# Patient Record
Sex: FEMALE | Race: WHITE | NOT HISPANIC OR LATINO | Employment: OTHER | URBAN - METROPOLITAN AREA
[De-identification: names, ages, dates, MRNs, and addresses within clinical notes are randomized per-mention and may not be internally consistent; named-entity substitution may affect disease eponyms.]

---

## 2017-07-22 ENCOUNTER — HOSPITAL ENCOUNTER (OUTPATIENT)
Dept: URGENT CARE | Facility: CLINIC | Age: 57
Discharge: HOME | End: 2017-07-22
Attending: FAMILY MEDICINE | Admitting: FAMILY MEDICINE

## 2017-11-14 ENCOUNTER — ON CAMPUS - OUTPATIENT (AMBULATORY)
Dept: URBAN - METROPOLITAN AREA HOSPITAL 2 | Facility: HOSPITAL | Age: 57
End: 2017-11-14

## 2017-11-14 ENCOUNTER — OFFICE (AMBULATORY)
Dept: URBAN - METROPOLITAN AREA CLINIC 64 | Facility: CLINIC | Age: 57
End: 2017-11-14

## 2017-11-14 VITALS
SYSTOLIC BLOOD PRESSURE: 118 MMHG | DIASTOLIC BLOOD PRESSURE: 80 MMHG | WEIGHT: 180 LBS | RESPIRATION RATE: 16 BRPM | OXYGEN SATURATION: 96 % | DIASTOLIC BLOOD PRESSURE: 79 MMHG | RESPIRATION RATE: 17 BRPM | SYSTOLIC BLOOD PRESSURE: 114 MMHG | OXYGEN SATURATION: 98 % | HEART RATE: 94 BPM | SYSTOLIC BLOOD PRESSURE: 124 MMHG | SYSTOLIC BLOOD PRESSURE: 127 MMHG | DIASTOLIC BLOOD PRESSURE: 85 MMHG | HEIGHT: 60 IN | SYSTOLIC BLOOD PRESSURE: 125 MMHG | DIASTOLIC BLOOD PRESSURE: 89 MMHG | HEART RATE: 101 BPM | SYSTOLIC BLOOD PRESSURE: 123 MMHG | DIASTOLIC BLOOD PRESSURE: 93 MMHG | DIASTOLIC BLOOD PRESSURE: 72 MMHG | TEMPERATURE: 97.8 F | DIASTOLIC BLOOD PRESSURE: 75 MMHG | HEART RATE: 92 BPM | OXYGEN SATURATION: 97 % | SYSTOLIC BLOOD PRESSURE: 106 MMHG | SYSTOLIC BLOOD PRESSURE: 119 MMHG | SYSTOLIC BLOOD PRESSURE: 132 MMHG | HEART RATE: 95 BPM | DIASTOLIC BLOOD PRESSURE: 82 MMHG | OXYGEN SATURATION: 100 % | HEART RATE: 99 BPM | DIASTOLIC BLOOD PRESSURE: 61 MMHG | HEART RATE: 91 BPM | HEART RATE: 107 BPM | SYSTOLIC BLOOD PRESSURE: 139 MMHG | DIASTOLIC BLOOD PRESSURE: 74 MMHG | HEART RATE: 90 BPM | SYSTOLIC BLOOD PRESSURE: 103 MMHG

## 2017-11-14 DIAGNOSIS — K29.70 GASTRITIS, UNSPECIFIED, WITHOUT BLEEDING: ICD-10-CM

## 2017-11-14 DIAGNOSIS — K22.2 ESOPHAGEAL OBSTRUCTION: ICD-10-CM

## 2017-11-14 DIAGNOSIS — K92.1 MELENA: ICD-10-CM

## 2017-11-14 DIAGNOSIS — K25.9 GASTRIC ULCER, UNSPECIFIED AS ACUTE OR CHRONIC, WITHOUT HEMO: ICD-10-CM

## 2017-11-14 DIAGNOSIS — K31.89 OTHER DISEASES OF STOMACH AND DUODENUM: ICD-10-CM

## 2017-11-14 DIAGNOSIS — Z86.010 PERSONAL HISTORY OF COLONIC POLYPS: ICD-10-CM

## 2017-11-14 DIAGNOSIS — K44.9 DIAPHRAGMATIC HERNIA WITHOUT OBSTRUCTION OR GANGRENE: ICD-10-CM

## 2017-11-14 DIAGNOSIS — R13.10 DYSPHAGIA, UNSPECIFIED: ICD-10-CM

## 2017-11-14 DIAGNOSIS — Z09 ENCOUNTER FOR FOLLOW-UP EXAMINATION AFTER COMPLETED TREATMEN: ICD-10-CM

## 2017-11-14 DIAGNOSIS — K57.30 DIVERTICULOSIS OF LARGE INTESTINE WITHOUT PERFORATION OR ABS: ICD-10-CM

## 2017-11-14 LAB
GI HISTOLOGY: A. SELECT: (no result)
GI HISTOLOGY: B. SELECT: (no result)
GI HISTOLOGY: C. SELECT: (no result)
GI HISTOLOGY: PDF REPORT: (no result)

## 2017-11-14 PROCEDURE — 43450 DILATE ESOPHAGUS 1/MULT PASS: CPT | Performed by: INTERNAL MEDICINE

## 2017-11-14 PROCEDURE — 88305 TISSUE EXAM BY PATHOLOGIST: CPT | Performed by: INTERNAL MEDICINE

## 2017-11-14 PROCEDURE — G0105 COLORECTAL SCRN; HI RISK IND: HCPCS | Performed by: INTERNAL MEDICINE

## 2017-11-14 PROCEDURE — 43239 EGD BIOPSY SINGLE/MULTIPLE: CPT | Mod: 59 | Performed by: INTERNAL MEDICINE

## 2017-11-14 RX ADMIN — PROPOFOL: 10 INJECTION, EMULSION INTRAVENOUS at 09:50

## 2017-11-14 NOTE — SERVICENOTES
If Bx of antral abnormality comes back as GAVE, will need Repeat EGD with APC of entire antrum to ablate GAVE. 
Ulcer is likely mike ulcer from large Hiatal hernia.

## 2017-11-14 NOTE — SERVICEHPINOTES
DARYL FIELDS  is a  57  female   who presents today for a  EGD-Colonoscopy   for   the indications listed below. The updated Patient Profile was reviewed prior to the procedure, in conjunction with the Physical Exam, including medical conditions, surgical procedures, medications, allergies, family history and social history. See Physical Exam time stamp below for date and time of HPI completion.Pre-operatively, I reviewed the indication(s) for the procedure, the risks of the procedure [including but not limited to: unexpected bleeding possibly requiring hospitalization and/or unplanned repeat procedures, perforation possibly requiring surgical treatment, missed lesions and complications of sedation/MAC (also explained by anesthesia staff)]. I have evaluated the patient for risks associated with the planned anesthesia and the procedure to be performed and find the patient an acceptable candidate for IV sedation.Multiple opportunities were provided for any questions or concerns, and all questions were answered satisfactorily before any anesthesia was administered. We will proceed with the planned procedure. BRmelena x many months. dysphagia worsening x many months. Food sticks and has required vomiting to get food out of her stomach. BR

## 2017-12-06 ENCOUNTER — ON CAMPUS - OUTPATIENT (AMBULATORY)
Dept: URBAN - METROPOLITAN AREA HOSPITAL 2 | Facility: HOSPITAL | Age: 57
End: 2017-12-06

## 2017-12-06 VITALS
SYSTOLIC BLOOD PRESSURE: 122 MMHG | DIASTOLIC BLOOD PRESSURE: 75 MMHG | SYSTOLIC BLOOD PRESSURE: 127 MMHG | OXYGEN SATURATION: 99 % | OXYGEN SATURATION: 95 % | DIASTOLIC BLOOD PRESSURE: 34 MMHG | OXYGEN SATURATION: 98 % | HEART RATE: 93 BPM | TEMPERATURE: 99.1 F | DIASTOLIC BLOOD PRESSURE: 79 MMHG | HEART RATE: 94 BPM | SYSTOLIC BLOOD PRESSURE: 135 MMHG | RESPIRATION RATE: 15 BRPM | SYSTOLIC BLOOD PRESSURE: 137 MMHG | DIASTOLIC BLOOD PRESSURE: 67 MMHG | DIASTOLIC BLOOD PRESSURE: 73 MMHG | HEART RATE: 85 BPM | OXYGEN SATURATION: 100 % | OXYGEN SATURATION: 94 % | HEIGHT: 60 IN | RESPIRATION RATE: 18 BRPM | RESPIRATION RATE: 17 BRPM | RESPIRATION RATE: 16 BRPM | DIASTOLIC BLOOD PRESSURE: 53 MMHG | SYSTOLIC BLOOD PRESSURE: 119 MMHG | SYSTOLIC BLOOD PRESSURE: 63 MMHG | OXYGEN SATURATION: 92 % | WEIGHT: 181 LBS

## 2017-12-06 DIAGNOSIS — K22.2 ESOPHAGEAL OBSTRUCTION: ICD-10-CM

## 2017-12-06 DIAGNOSIS — K31.819 ANGIODYSPLASIA OF STOMACH AND DUODENUM WITHOUT BLEEDING: ICD-10-CM

## 2017-12-06 DIAGNOSIS — K44.9 DIAPHRAGMATIC HERNIA WITHOUT OBSTRUCTION OR GANGRENE: ICD-10-CM

## 2017-12-06 DIAGNOSIS — D50.9 IRON DEFICIENCY ANEMIA, UNSPECIFIED: ICD-10-CM

## 2017-12-06 PROCEDURE — 43450 DILATE ESOPHAGUS 1/MULT PASS: CPT | Performed by: INTERNAL MEDICINE

## 2017-12-06 PROCEDURE — 43235 EGD DIAGNOSTIC BRUSH WASH: CPT | Performed by: INTERNAL MEDICINE

## 2017-12-06 RX ADMIN — PROPOFOL: 10 INJECTION, EMULSION INTRAVENOUS at 10:33

## 2017-12-14 ENCOUNTER — OFFICE (AMBULATORY)
Dept: URBAN - METROPOLITAN AREA CLINIC 64 | Facility: CLINIC | Age: 57
End: 2017-12-14

## 2017-12-14 VITALS
HEIGHT: 60 IN | WEIGHT: 181 LBS | SYSTOLIC BLOOD PRESSURE: 112 MMHG | DIASTOLIC BLOOD PRESSURE: 78 MMHG | HEART RATE: 91 BPM

## 2017-12-14 DIAGNOSIS — R10.13 EPIGASTRIC PAIN: ICD-10-CM

## 2017-12-14 DIAGNOSIS — R13.19 OTHER DYSPHAGIA: ICD-10-CM

## 2017-12-14 LAB
C-REACTIVE PROTEIN, QUANT: 8.4 MG/L — HIGH (ref 0–4.9)
CBC WITH DIFFERENTIAL/PLATELET: BASO (ABSOLUTE): 0 X10E3/UL (ref 0–0.2)
CBC WITH DIFFERENTIAL/PLATELET: BASOS: 0 %
CBC WITH DIFFERENTIAL/PLATELET: EOS (ABSOLUTE): 0.1 X10E3/UL (ref 0–0.4)
CBC WITH DIFFERENTIAL/PLATELET: EOS: 2 %
CBC WITH DIFFERENTIAL/PLATELET: HEMATOCRIT: 35.6 % (ref 34–46.6)
CBC WITH DIFFERENTIAL/PLATELET: HEMATOLOGY COMMENTS: (no result)
CBC WITH DIFFERENTIAL/PLATELET: HEMOGLOBIN: 11.5 G/DL (ref 11.1–15.9)
CBC WITH DIFFERENTIAL/PLATELET: IMMATURE CELLS: (no result)
CBC WITH DIFFERENTIAL/PLATELET: IMMATURE GRANS (ABS): 0 X10E3/UL (ref 0–0.1)
CBC WITH DIFFERENTIAL/PLATELET: IMMATURE GRANULOCYTES: 0 %
CBC WITH DIFFERENTIAL/PLATELET: LYMPHS (ABSOLUTE): 2.8 X10E3/UL (ref 0.7–3.1)
CBC WITH DIFFERENTIAL/PLATELET: LYMPHS: 36 %
CBC WITH DIFFERENTIAL/PLATELET: MCH: 28 PG (ref 26.6–33)
CBC WITH DIFFERENTIAL/PLATELET: MCHC: 32.3 G/DL (ref 31.5–35.7)
CBC WITH DIFFERENTIAL/PLATELET: MCV: 87 FL (ref 79–97)
CBC WITH DIFFERENTIAL/PLATELET: MONOCYTES(ABSOLUTE): 0.4 X10E3/UL (ref 0.1–0.9)
CBC WITH DIFFERENTIAL/PLATELET: MONOCYTES: 5 %
CBC WITH DIFFERENTIAL/PLATELET: NEUTROPHILS (ABSOLUTE): 4.5 X10E3/UL (ref 1.4–7)
CBC WITH DIFFERENTIAL/PLATELET: NEUTROPHILS: 57 %
CBC WITH DIFFERENTIAL/PLATELET: NRBC: (no result)
CBC WITH DIFFERENTIAL/PLATELET: PLATELETS: 331 X10E3/UL (ref 150–379)
CBC WITH DIFFERENTIAL/PLATELET: RBC: 4.1 X10E6/UL (ref 3.77–5.28)
CBC WITH DIFFERENTIAL/PLATELET: RDW: 14.8 % (ref 12.3–15.4)
CBC WITH DIFFERENTIAL/PLATELET: WBC: 7.9 X10E3/UL (ref 3.4–10.8)
COMP. METABOLIC PANEL (14): A/G RATIO: 1.8 (ref 1.2–2.2)
COMP. METABOLIC PANEL (14): ALBUMIN, SERUM: 4.5 G/DL (ref 3.5–5.5)
COMP. METABOLIC PANEL (14): ALKALINE PHOSPHATASE, S: 101 IU/L (ref 39–117)
COMP. METABOLIC PANEL (14): ALT (SGPT): 18 IU/L (ref 0–32)
COMP. METABOLIC PANEL (14): AST (SGOT): 19 IU/L (ref 0–40)
COMP. METABOLIC PANEL (14): BILIRUBIN, TOTAL: 0.2 MG/DL (ref 0–1.2)
COMP. METABOLIC PANEL (14): BUN/CREATININE RATIO: 15 (ref 9–23)
COMP. METABOLIC PANEL (14): BUN: 11 MG/DL (ref 6–24)
COMP. METABOLIC PANEL (14): CALCIUM, SERUM: 9.8 MG/DL (ref 8.7–10.2)
COMP. METABOLIC PANEL (14): CARBON DIOXIDE, TOTAL: 22 MMOL/L (ref 18–29)
COMP. METABOLIC PANEL (14): CHLORIDE, SERUM: 99 MMOL/L (ref 96–106)
COMP. METABOLIC PANEL (14): CREATININE, SERUM: 0.74 MG/DL (ref 0.57–1)
COMP. METABOLIC PANEL (14): EGFR IF AFRICN AM: 105 ML/MIN/1.73 (ref 59–?)
COMP. METABOLIC PANEL (14): EGFR IF NONAFRICN AM: 91 ML/MIN/1.73 (ref 59–?)
COMP. METABOLIC PANEL (14): GLOBULIN, TOTAL: 2.5 G/DL (ref 1.5–4.5)
COMP. METABOLIC PANEL (14): GLUCOSE, SERUM: 82 MG/DL (ref 65–99)
COMP. METABOLIC PANEL (14): POTASSIUM, SERUM: 3.8 MMOL/L (ref 3.5–5.2)
COMP. METABOLIC PANEL (14): PROTEIN, TOTAL, SERUM: 7 G/DL (ref 6–8.5)
COMP. METABOLIC PANEL (14): SODIUM, SERUM: 139 MMOL/L (ref 134–144)
FERRITIN, SERUM: 77 NG/ML (ref 15–150)

## 2017-12-14 PROCEDURE — 99214 OFFICE O/P EST MOD 30 MIN: CPT | Performed by: INTERNAL MEDICINE

## 2017-12-14 RX ORDER — DICYCLOMINE HYDROCHLORIDE 20 MG/1
80 TABLET ORAL
Qty: 60 | Refills: 1 | Status: COMPLETED
Start: 2017-12-14 | End: 2018-01-05

## 2018-01-05 ENCOUNTER — OFFICE (AMBULATORY)
Dept: URBAN - METROPOLITAN AREA CLINIC 64 | Facility: CLINIC | Age: 58
End: 2018-01-05

## 2018-01-05 VITALS
SYSTOLIC BLOOD PRESSURE: 128 MMHG | WEIGHT: 182 LBS | DIASTOLIC BLOOD PRESSURE: 76 MMHG | HEART RATE: 83 BPM | HEIGHT: 60 IN

## 2018-01-05 DIAGNOSIS — R10.13 EPIGASTRIC PAIN: ICD-10-CM

## 2018-01-05 DIAGNOSIS — K59.1 FUNCTIONAL DIARRHEA: ICD-10-CM

## 2018-01-05 PROCEDURE — 99214 OFFICE O/P EST MOD 30 MIN: CPT | Performed by: INTERNAL MEDICINE

## 2018-01-05 RX ORDER — LOPERAMIDE HYDROCHLORIDE 2 MG/1
TABLET, FILM COATED ORAL
Qty: 60 | Refills: 2 | Status: COMPLETED
Start: 2018-01-05 | End: 2018-04-17

## 2018-01-05 RX ORDER — HYOSCYAMINE SULFATE 0.12 MG/1
0.5 TABLET ORAL; SUBLINGUAL
Qty: 60 | Refills: 2 | Status: COMPLETED
Start: 2018-01-05 | End: 2018-01-31

## 2018-01-31 ENCOUNTER — OFFICE (AMBULATORY)
Dept: URBAN - METROPOLITAN AREA CLINIC 64 | Facility: CLINIC | Age: 58
End: 2018-01-31

## 2018-01-31 VITALS
HEIGHT: 60 IN | SYSTOLIC BLOOD PRESSURE: 119 MMHG | DIASTOLIC BLOOD PRESSURE: 77 MMHG | HEART RATE: 75 BPM | WEIGHT: 176 LBS

## 2018-01-31 DIAGNOSIS — R15.9 FULL INCONTINENCE OF FECES: ICD-10-CM

## 2018-01-31 DIAGNOSIS — K58.0 IRRITABLE BOWEL SYNDROME WITH DIARRHEA: ICD-10-CM

## 2018-01-31 PROCEDURE — 99214 OFFICE O/P EST MOD 30 MIN: CPT | Performed by: INTERNAL MEDICINE

## 2018-01-31 RX ORDER — COLESTIPOL HYDROCHLORIDE 1 G/1
2 TABLET, FILM COATED ORAL
Qty: 90 | Refills: 3 | Status: COMPLETED
Start: 2017-12-29 | End: 2018-04-17

## 2018-01-31 RX ORDER — ALOSETRON HYDROCHLORIDE 0.5 MG/1
1 TABLET ORAL
Qty: 180 | Refills: 3 | Status: COMPLETED
Start: 2018-01-31 | End: 2019-04-25

## 2018-02-15 ENCOUNTER — OFFICE (AMBULATORY)
Dept: URBAN - METROPOLITAN AREA CLINIC 64 | Facility: CLINIC | Age: 58
End: 2018-02-15

## 2018-02-15 VITALS
DIASTOLIC BLOOD PRESSURE: 81 MMHG | WEIGHT: 176 LBS | HEIGHT: 60 IN | HEART RATE: 95 BPM | SYSTOLIC BLOOD PRESSURE: 120 MMHG

## 2018-02-15 DIAGNOSIS — K59.1 FUNCTIONAL DIARRHEA: ICD-10-CM

## 2018-02-15 PROCEDURE — 99212 OFFICE O/P EST SF 10 MIN: CPT | Performed by: NURSE PRACTITIONER

## 2018-04-17 ENCOUNTER — OFFICE (AMBULATORY)
Dept: URBAN - METROPOLITAN AREA CLINIC 64 | Facility: CLINIC | Age: 58
End: 2018-04-17

## 2018-04-17 VITALS
HEART RATE: 85 BPM | WEIGHT: 167 LBS | HEIGHT: 60 IN | SYSTOLIC BLOOD PRESSURE: 104 MMHG | DIASTOLIC BLOOD PRESSURE: 75 MMHG

## 2018-04-17 DIAGNOSIS — K58.0 IRRITABLE BOWEL SYNDROME WITH DIARRHEA: ICD-10-CM

## 2018-04-17 PROCEDURE — 99213 OFFICE O/P EST LOW 20 MIN: CPT | Performed by: INTERNAL MEDICINE

## 2018-04-17 RX ORDER — ALOSETRON HYDROCHLORIDE 0.5 MG/1
1 TABLET ORAL
Qty: 180 | Refills: 3 | Status: COMPLETED
Start: 2018-01-31 | End: 2019-04-25

## 2018-10-02 ENCOUNTER — OFFICE (AMBULATORY)
Dept: URBAN - METROPOLITAN AREA CLINIC 64 | Facility: CLINIC | Age: 58
End: 2018-10-02

## 2018-10-02 VITALS
SYSTOLIC BLOOD PRESSURE: 111 MMHG | DIASTOLIC BLOOD PRESSURE: 73 MMHG | HEART RATE: 97 BPM | WEIGHT: 172 LBS | HEIGHT: 60 IN

## 2018-10-02 DIAGNOSIS — R10.13 EPIGASTRIC PAIN: ICD-10-CM

## 2018-10-02 DIAGNOSIS — K58.0 IRRITABLE BOWEL SYNDROME WITH DIARRHEA: ICD-10-CM

## 2018-10-02 PROCEDURE — 99214 OFFICE O/P EST MOD 30 MIN: CPT | Performed by: INTERNAL MEDICINE

## 2018-10-02 RX ORDER — SUCRALFATE 1 G/1
4 TABLET ORAL
Qty: 120 | Refills: 5 | Status: COMPLETED
Start: 2018-10-02 | End: 2018-12-17

## 2018-12-17 ENCOUNTER — OFFICE (AMBULATORY)
Dept: URBAN - METROPOLITAN AREA CLINIC 64 | Facility: CLINIC | Age: 58
End: 2018-12-17

## 2018-12-17 VITALS
HEART RATE: 64 BPM | HEIGHT: 60 IN | DIASTOLIC BLOOD PRESSURE: 77 MMHG | WEIGHT: 175 LBS | SYSTOLIC BLOOD PRESSURE: 110 MMHG

## 2018-12-17 DIAGNOSIS — K58.0 IRRITABLE BOWEL SYNDROME WITH DIARRHEA: ICD-10-CM

## 2018-12-17 PROCEDURE — 99213 OFFICE O/P EST LOW 20 MIN: CPT | Performed by: INTERNAL MEDICINE

## 2018-12-17 RX ORDER — ALOSETRON HYDROCHLORIDE 0.5 MG/1
1 TABLET ORAL
Qty: 180 | Refills: 3 | Status: COMPLETED
Start: 2018-01-31 | End: 2019-04-25

## 2018-12-17 RX ORDER — SUCRALFATE 1 G/1
4 TABLET ORAL
Qty: 120 | Refills: 5 | Status: COMPLETED
Start: 2018-10-02 | End: 2018-12-17

## 2018-12-17 RX ORDER — LOPERAMIDE HYDROCHLORIDE 2 MG/1
2 TABLET, FILM COATED ORAL
Qty: 100 | Refills: 10 | Status: COMPLETED
Start: 2018-12-17 | End: 2020-11-19

## 2019-04-25 ENCOUNTER — OFFICE (AMBULATORY)
Dept: URBAN - METROPOLITAN AREA CLINIC 64 | Facility: CLINIC | Age: 59
End: 2019-04-25

## 2019-04-25 VITALS
DIASTOLIC BLOOD PRESSURE: 73 MMHG | HEIGHT: 60 IN | SYSTOLIC BLOOD PRESSURE: 108 MMHG | HEART RATE: 110 BPM | WEIGHT: 181 LBS

## 2019-04-25 DIAGNOSIS — R15.2 FECAL URGENCY: ICD-10-CM

## 2019-04-25 DIAGNOSIS — K58.0 IRRITABLE BOWEL SYNDROME WITH DIARRHEA: ICD-10-CM

## 2019-04-25 DIAGNOSIS — R14.0 ABDOMINAL DISTENSION (GASEOUS): ICD-10-CM

## 2019-04-25 PROCEDURE — 99213 OFFICE O/P EST LOW 20 MIN: CPT | Performed by: NURSE PRACTITIONER

## 2019-04-25 RX ORDER — LOPERAMIDE HYDROCHLORIDE 2 MG/1
2 TABLET, FILM COATED ORAL
Qty: 30 | Refills: 10 | Status: COMPLETED
Start: 2019-04-25 | End: 2020-11-19

## 2019-09-20 ENCOUNTER — OFFICE (AMBULATORY)
Dept: URBAN - METROPOLITAN AREA CLINIC 64 | Facility: CLINIC | Age: 59
End: 2019-09-20

## 2019-09-20 VITALS
SYSTOLIC BLOOD PRESSURE: 113 MMHG | WEIGHT: 162 LBS | HEART RATE: 70 BPM | HEIGHT: 60 IN | DIASTOLIC BLOOD PRESSURE: 74 MMHG

## 2019-09-20 DIAGNOSIS — R63.4 ABNORMAL WEIGHT LOSS: ICD-10-CM

## 2019-09-20 DIAGNOSIS — K91.5 POSTCHOLECYSTECTOMY SYNDROME: ICD-10-CM

## 2019-09-20 DIAGNOSIS — R76.8 OTHER SPECIFIED ABNORMAL IMMUNOLOGICAL FINDINGS IN SERUM: ICD-10-CM

## 2019-09-20 DIAGNOSIS — Z90.49 ACQUIRED ABSENCE OF OTHER SPECIFIED PARTS OF DIGESTIVE TRACT: ICD-10-CM

## 2019-09-20 DIAGNOSIS — R11.0 NAUSEA: ICD-10-CM

## 2019-09-20 PROCEDURE — 99213 OFFICE O/P EST LOW 20 MIN: CPT | Performed by: NURSE PRACTITIONER

## 2019-09-20 RX ORDER — SUCRALFATE 1 G/1
4 TABLET ORAL
Qty: 120 | Refills: 11 | Status: COMPLETED
Start: 2019-09-20 | End: 2021-01-08

## 2019-09-20 RX ORDER — COLESTIPOL HYDROCHLORIDE 1 G/1
2 TABLET, FILM COATED ORAL
Qty: 60 | Refills: 11 | Status: ACTIVE
Start: 2019-09-20

## 2020-11-19 VITALS — HEIGHT: 60 IN | WEIGHT: 131 LBS

## 2020-11-20 ENCOUNTER — OFFICE (AMBULATORY)
Dept: URBAN - METROPOLITAN AREA CLINIC 75 | Facility: CLINIC | Age: 60
End: 2020-11-20
Payer: COMMERCIAL

## 2020-11-20 DIAGNOSIS — R15.9 FULL INCONTINENCE OF FECES: ICD-10-CM

## 2020-11-20 DIAGNOSIS — R14.0 ABDOMINAL DISTENSION (GASEOUS): ICD-10-CM

## 2020-11-20 DIAGNOSIS — R13.10 DYSPHAGIA, UNSPECIFIED: ICD-10-CM

## 2020-11-20 DIAGNOSIS — D12.6 BENIGN NEOPLASM OF COLON, UNSPECIFIED: ICD-10-CM

## 2020-11-20 DIAGNOSIS — K59.1 FUNCTIONAL DIARRHEA: ICD-10-CM

## 2020-11-20 DIAGNOSIS — K91.5 POSTCHOLECYSTECTOMY SYNDROME: ICD-10-CM

## 2020-11-20 DIAGNOSIS — R10.13 EPIGASTRIC PAIN: ICD-10-CM

## 2020-11-20 DIAGNOSIS — K22.2 ESOPHAGEAL OBSTRUCTION: ICD-10-CM

## 2020-11-20 DIAGNOSIS — K57.30 DIVERTICULOSIS OF LARGE INTESTINE WITHOUT PERFORATION OR ABS: ICD-10-CM

## 2020-11-20 DIAGNOSIS — D50.9 IRON DEFICIENCY ANEMIA, UNSPECIFIED: ICD-10-CM

## 2020-11-20 DIAGNOSIS — K44.9 DIAPHRAGMATIC HERNIA WITHOUT OBSTRUCTION OR GANGRENE: ICD-10-CM

## 2020-11-20 PROCEDURE — 99214 OFFICE O/P EST MOD 30 MIN: CPT | Mod: 95 | Performed by: INTERNAL MEDICINE

## 2020-11-20 RX ORDER — AMITRIPTYLINE HYDROCHLORIDE 10 MG/1
TABLET, FILM COATED ORAL
Qty: 60 | Refills: 3 | Status: COMPLETED
Start: 2020-11-20 | End: 2021-04-12

## 2021-01-08 ENCOUNTER — OFFICE (AMBULATORY)
Dept: URBAN - METROPOLITAN AREA CLINIC 1 | Facility: CLINIC | Age: 61
End: 2021-01-08
Payer: MEDICAID

## 2021-01-08 VITALS — HEART RATE: 69 BPM | HEIGHT: 60 IN | WEIGHT: 139 LBS | OXYGEN SATURATION: 97 %

## 2021-01-08 DIAGNOSIS — R13.0 APHAGIA: ICD-10-CM

## 2021-01-08 DIAGNOSIS — K91.5 POSTCHOLECYSTECTOMY SYNDROME: ICD-10-CM

## 2021-01-08 DIAGNOSIS — R15.9 FULL INCONTINENCE OF FECES: ICD-10-CM

## 2021-01-08 DIAGNOSIS — R14.0 ABDOMINAL DISTENSION (GASEOUS): ICD-10-CM

## 2021-01-08 DIAGNOSIS — K57.30 DIVERTICULOSIS OF LARGE INTESTINE WITHOUT PERFORATION OR ABS: ICD-10-CM

## 2021-01-08 DIAGNOSIS — R10.13 EPIGASTRIC PAIN: ICD-10-CM

## 2021-01-08 DIAGNOSIS — K22.2 ESOPHAGEAL OBSTRUCTION: ICD-10-CM

## 2021-01-08 DIAGNOSIS — D50.9 IRON DEFICIENCY ANEMIA, UNSPECIFIED: ICD-10-CM

## 2021-01-08 DIAGNOSIS — D12.6 BENIGN NEOPLASM OF COLON, UNSPECIFIED: ICD-10-CM

## 2021-01-08 DIAGNOSIS — K44.9 DIAPHRAGMATIC HERNIA WITHOUT OBSTRUCTION OR GANGRENE: ICD-10-CM

## 2021-01-08 DIAGNOSIS — K59.1 FUNCTIONAL DIARRHEA: ICD-10-CM

## 2021-01-08 PROCEDURE — 99214 OFFICE O/P EST MOD 30 MIN: CPT | Performed by: INTERNAL MEDICINE

## 2021-01-08 NOTE — SERVICEHPINOTES
Ms. Johnson is here for follow-up.  I am seeing her in the office for the first time.  I did do a telemedicine visit with her in November. she is here today with complaints of worsening nausea over the last several weeks.  She's having nausea with or without food.  She's having dry heaves in the morning.  There is no melena.  There is no hematemesis.  She also reports abdominal pain and points to the epigastric area.  She says it hurts "all of the time".  It is also worse after she eats.  She does report a history of ulcers.  Reviewed her records and she also has a history of a Schatzki's ring as well as a medium-sized hiatal hernia.  She's also been treated in the past for AVMs of the stomach.She does have a history of iron deficiency anemia.  In December she was scheduled for an IV iron infusion because she can't tolerate oral iron.  During the iron infusion she says she got sweaty and nauseous.  She apparently had chest pain.  The infusion was stopped and she had a cardiac workup.  After all this she says she was started on Carafate and that has helped her abdominal pain, she does however have trouble swallowing Carafate so I told her she can cut it in half or dissolve it in water and take it as a slurry.  She does not use nonsteroidals.  Again there is no melena.  There is no hematemesis.  There is no weight loss.  She does not smoke or drink.She also has "constant loose stools".  She's had colonoscopy in the past.  There is no history of polyps.  There is no family history of colon cancer.  She apparently was supposed to be started on Lotronex at one point but insurance would not cover it.  She is now on amitriptyline but that does not seem to help.  She says her stools aren't watery or hard they're "in between".  She only has a bowel movement after meals.  She has no nocturnal symptoms.  There's been no fevers or chills.  There's been no travel or well water use.  She is in no distress.  She does not look acutely ill.

## 2021-02-18 VITALS
SYSTOLIC BLOOD PRESSURE: 125 MMHG | RESPIRATION RATE: 8 BRPM | RESPIRATION RATE: 21 BRPM | DIASTOLIC BLOOD PRESSURE: 63 MMHG | HEART RATE: 61 BPM | HEART RATE: 67 BPM | TEMPERATURE: 97.8 F | DIASTOLIC BLOOD PRESSURE: 69 MMHG | HEART RATE: 59 BPM | OXYGEN SATURATION: 99 % | SYSTOLIC BLOOD PRESSURE: 137 MMHG | HEIGHT: 60 IN | SYSTOLIC BLOOD PRESSURE: 109 MMHG | SYSTOLIC BLOOD PRESSURE: 111 MMHG | OXYGEN SATURATION: 100 % | DIASTOLIC BLOOD PRESSURE: 81 MMHG | SYSTOLIC BLOOD PRESSURE: 105 MMHG | RESPIRATION RATE: 10 BRPM | HEART RATE: 73 BPM | TEMPERATURE: 96.9 F | HEART RATE: 62 BPM | HEART RATE: 66 BPM | DIASTOLIC BLOOD PRESSURE: 88 MMHG | RESPIRATION RATE: 16 BRPM | WEIGHT: 139 LBS | DIASTOLIC BLOOD PRESSURE: 61 MMHG | HEART RATE: 69 BPM | SYSTOLIC BLOOD PRESSURE: 118 MMHG | SYSTOLIC BLOOD PRESSURE: 131 MMHG | RESPIRATION RATE: 6 BRPM | DIASTOLIC BLOOD PRESSURE: 71 MMHG | DIASTOLIC BLOOD PRESSURE: 59 MMHG | RESPIRATION RATE: 18 BRPM | RESPIRATION RATE: 17 BRPM | SYSTOLIC BLOOD PRESSURE: 126 MMHG | DIASTOLIC BLOOD PRESSURE: 62 MMHG | SYSTOLIC BLOOD PRESSURE: 110 MMHG | HEART RATE: 68 BPM | OXYGEN SATURATION: 96 % | RESPIRATION RATE: 14 BRPM | SYSTOLIC BLOOD PRESSURE: 129 MMHG

## 2021-02-23 ENCOUNTER — OFFICE (AMBULATORY)
Dept: URBAN - METROPOLITAN AREA PATHOLOGY 4 | Facility: PATHOLOGY | Age: 61
End: 2021-02-23

## 2021-02-23 ENCOUNTER — AMBULATORY SURGICAL CENTER (AMBULATORY)
Dept: URBAN - METROPOLITAN AREA SURGERY 17 | Facility: SURGERY | Age: 61
End: 2021-02-23
Payer: MEDICAID

## 2021-02-23 DIAGNOSIS — K64.8 OTHER HEMORRHOIDS: ICD-10-CM

## 2021-02-23 DIAGNOSIS — D50.9 IRON DEFICIENCY ANEMIA, UNSPECIFIED: ICD-10-CM

## 2021-02-23 DIAGNOSIS — K59.1 FUNCTIONAL DIARRHEA: ICD-10-CM

## 2021-02-23 DIAGNOSIS — K57.30 DIVERTICULOSIS OF LARGE INTESTINE WITHOUT PERFORATION OR ABS: ICD-10-CM

## 2021-02-23 DIAGNOSIS — K31.89 OTHER DISEASES OF STOMACH AND DUODENUM: ICD-10-CM

## 2021-02-23 DIAGNOSIS — K22.2 ESOPHAGEAL OBSTRUCTION: ICD-10-CM

## 2021-02-23 DIAGNOSIS — K62.1 RECTAL POLYP: ICD-10-CM

## 2021-02-23 DIAGNOSIS — R10.13 EPIGASTRIC PAIN: ICD-10-CM

## 2021-02-23 DIAGNOSIS — K44.9 DIAPHRAGMATIC HERNIA WITHOUT OBSTRUCTION OR GANGRENE: ICD-10-CM

## 2021-02-23 DIAGNOSIS — R11.2 NAUSEA WITH VOMITING, UNSPECIFIED: ICD-10-CM

## 2021-02-23 LAB
GI HISTOLOGY: A. SELECT: (no result)
GI HISTOLOGY: B. SELECT: (no result)
GI HISTOLOGY: C. UNSPECIFIED: (no result)
GI HISTOLOGY: D. SELECT: (no result)
GI HISTOLOGY: E. SELECT: (no result)
GI HISTOLOGY: F. UNSPECIFIED: (no result)
GI HISTOLOGY: PDF REPORT: (no result)

## 2021-02-23 PROCEDURE — 45385 COLONOSCOPY W/LESION REMOVAL: CPT | Performed by: INTERNAL MEDICINE

## 2021-02-23 PROCEDURE — 43239 EGD BIOPSY SINGLE/MULTIPLE: CPT | Performed by: INTERNAL MEDICINE

## 2021-02-23 PROCEDURE — 88305 TISSUE EXAM BY PATHOLOGIST: CPT | Performed by: INTERNAL MEDICINE

## 2021-02-23 PROCEDURE — 45380 COLONOSCOPY AND BIOPSY: CPT | Mod: 59 | Performed by: INTERNAL MEDICINE

## 2021-02-23 PROCEDURE — 88342 IMHCHEM/IMCYTCHM 1ST ANTB: CPT | Performed by: INTERNAL MEDICINE

## 2021-02-26 ENCOUNTER — TRANSCRIBE ORDERS (OUTPATIENT)
Dept: ADMINISTRATIVE | Facility: HOSPITAL | Age: 61
End: 2021-02-26

## 2021-02-26 DIAGNOSIS — R11.2 NON-INTRACTABLE VOMITING WITH NAUSEA, UNSPECIFIED VOMITING TYPE: Primary | ICD-10-CM

## 2021-03-09 ENCOUNTER — APPOINTMENT (OUTPATIENT)
Dept: NUCLEAR MEDICINE | Facility: HOSPITAL | Age: 61
End: 2021-03-09

## 2021-03-12 ENCOUNTER — HOSPITAL ENCOUNTER (OUTPATIENT)
Dept: NUCLEAR MEDICINE | Facility: HOSPITAL | Age: 61
Discharge: HOME | End: 2021-03-12

## 2021-03-12 DIAGNOSIS — R11.2 NON-INTRACTABLE VOMITING WITH NAUSEA, UNSPECIFIED VOMITING TYPE: ICD-10-CM

## 2021-03-12 PROCEDURE — A9541 TC99M SULFUR COLLOID: HCPCS | Performed by: INTERNAL MEDICINE

## 2021-03-12 PROCEDURE — 78264 GASTRIC EMPTYING IMG STUDY: CPT

## 2021-03-12 PROCEDURE — 0 TECHNETIUM SULFUR COLLOID: Performed by: INTERNAL MEDICINE

## 2021-03-12 RX ADMIN — TECHNETIUM TC 99M SULFUR COLLOID 1 DOSE: KIT at 07:35

## 2021-04-12 ENCOUNTER — OFFICE (AMBULATORY)
Dept: URBAN - METROPOLITAN AREA CLINIC 75 | Facility: CLINIC | Age: 61
End: 2021-04-12

## 2021-04-12 VITALS
DIASTOLIC BLOOD PRESSURE: 68 MMHG | TEMPERATURE: 97.1 F | HEIGHT: 60 IN | OXYGEN SATURATION: 98 % | HEART RATE: 98 BPM | WEIGHT: 145 LBS | SYSTOLIC BLOOD PRESSURE: 118 MMHG

## 2021-04-12 DIAGNOSIS — K59.1 FUNCTIONAL DIARRHEA: ICD-10-CM

## 2021-04-12 DIAGNOSIS — Q27.33 ARTERIOVENOUS MALFORMATION OF DIGESTIVE SYSTEM VESSEL: ICD-10-CM

## 2021-04-12 DIAGNOSIS — K57.30 DIVERTICULOSIS OF LARGE INTESTINE WITHOUT PERFORATION OR ABS: ICD-10-CM

## 2021-04-12 DIAGNOSIS — A04.9 BACTERIAL INTESTINAL INFECTION, UNSPECIFIED: ICD-10-CM

## 2021-04-12 PROBLEM — K31.89 OTHER DISEASES OF STOMACH AND DUODENUM: Status: ACTIVE | Noted: 2017-11-14

## 2021-04-12 PROBLEM — K92.1 MELENA: Status: ACTIVE | Noted: 2017-11-14

## 2021-04-12 PROBLEM — K29.70 GASTRITIS, UNSPECIFIED, WITHOUT BLEEDING: Status: ACTIVE | Noted: 2017-11-14

## 2021-04-12 PROBLEM — K31.819 VASCULAR ECTASIA OF GASTRIC ANTRUM: Status: ACTIVE | Noted: 2017-12-06

## 2021-04-12 PROBLEM — K62.1 RECTAL POLYP: Status: ACTIVE | Noted: 2021-02-23

## 2021-04-12 PROBLEM — K44.9 DIAPHRAGMATIC HERNIA WITHOUT OBSTRUCTION OR GANGRENE: Status: ACTIVE | Noted: 2017-11-14

## 2021-04-12 PROBLEM — K25.9 GASTRIC ULCER, UNSP AS ACUTE OR CHRONIC, W/O HEMOR OR PERF: Status: ACTIVE | Noted: 2017-11-14

## 2021-04-12 PROBLEM — Z86.010 PERSONAL HISTORY OF COLONIC POLYPS: Status: ACTIVE | Noted: 2017-11-14

## 2021-04-12 PROCEDURE — 99214 OFFICE O/P EST MOD 30 MIN: CPT | Performed by: NURSE PRACTITIONER

## 2021-04-12 RX ORDER — OMEPRAZOLE 20 MG/1
CAPSULE, DELAYED RELEASE ORAL
Qty: 90 | Refills: 3 | Status: COMPLETED
End: 2021-04-12

## 2021-04-12 RX ORDER — DICYCLOMINE HYDROCHLORIDE 20 MG/1
80 TABLET ORAL
Qty: 60 | Refills: 5 | Status: COMPLETED
End: 2022-08-17

## 2021-04-12 RX ORDER — OMEPRAZOLE 40 MG/1
CAPSULE, DELAYED RELEASE ORAL
Qty: 90 | Refills: 3 | Status: COMPLETED
Start: 2021-04-12 | End: 2022-11-09

## 2021-04-12 RX ORDER — RIFAXIMIN 550 MG/1
1650 TABLET ORAL
Qty: 42 | Refills: 2 | Status: COMPLETED
Start: 2021-04-12 | End: 2022-08-17

## 2022-04-28 ENCOUNTER — OFFICE VISIT (OUTPATIENT)
Dept: SURGERY | Facility: CLINIC | Age: 62
End: 2022-04-28

## 2022-04-28 VITALS
OXYGEN SATURATION: 100 % | HEIGHT: 60 IN | BODY MASS INDEX: 31.45 KG/M2 | TEMPERATURE: 97.8 F | HEART RATE: 90 BPM | WEIGHT: 160.2 LBS | DIASTOLIC BLOOD PRESSURE: 77 MMHG | SYSTOLIC BLOOD PRESSURE: 135 MMHG

## 2022-04-28 DIAGNOSIS — D17.1 LIPOMA OF TORSO: Primary | ICD-10-CM

## 2022-04-28 PROCEDURE — 99203 OFFICE O/P NEW LOW 30 MIN: CPT | Performed by: SURGERY

## 2022-04-28 RX ORDER — IBUPROFEN 200 MG
TABLET ORAL
COMMUNITY
Start: 2015-10-23 | End: 2022-06-02

## 2022-04-28 RX ORDER — BREXPIPRAZOLE 2 MG/1
1 TABLET ORAL EVERY MORNING
COMMUNITY
Start: 2022-04-06

## 2022-04-28 RX ORDER — SODIUM CHLORIDE 9 MG/ML
100 INJECTION, SOLUTION INTRAVENOUS CONTINUOUS
Status: CANCELLED | OUTPATIENT
Start: 2022-04-28

## 2022-04-28 RX ORDER — FLUTICASONE PROPIONATE 50 MCG
SPRAY, SUSPENSION (ML) NASAL
COMMUNITY
Start: 2022-02-02 | End: 2022-05-11 | Stop reason: HOSPADM

## 2022-04-28 RX ORDER — DULOXETIN HYDROCHLORIDE 30 MG/1
30 CAPSULE, DELAYED RELEASE ORAL
COMMUNITY
Start: 2022-03-07 | End: 2022-12-09

## 2022-04-28 RX ORDER — DULOXETIN HYDROCHLORIDE 60 MG/1
60 CAPSULE, DELAYED RELEASE ORAL EVERY MORNING
COMMUNITY
Start: 2022-03-07

## 2022-04-28 RX ORDER — CLONAZEPAM 0.5 MG/1
0.5 TABLET ORAL EVERY 12 HOURS
COMMUNITY
Start: 2022-03-31

## 2022-04-28 RX ORDER — OMEPRAZOLE 40 MG/1
CAPSULE, DELAYED RELEASE ORAL
COMMUNITY
Start: 2022-02-22

## 2022-04-28 RX ORDER — LEVOTHYROXINE SODIUM 88 UG/1
CAPSULE ORAL
COMMUNITY
End: 2022-12-09

## 2022-04-28 RX ORDER — RISPERIDONE 0.5 MG/1
TABLET ORAL
COMMUNITY
Start: 2015-10-23 | End: 2022-04-28

## 2022-04-28 NOTE — PROGRESS NOTES
"Subjective   Gina Wright is a 62 y.o. female.   Chief Complaint   Patient presents with   • New Patient     Mass Back/Right Shoulder  Ref. Dr. Peng     /77 (Cuff Size: Adult)   Pulse 90   Temp 97.8 °F (36.6 °C) (Infrared)   Ht 152.4 cm (60\")   Wt 72.7 kg (160 lb 3.2 oz)   SpO2 100%   BMI 31.29 kg/m²     HISTORY OF PRESENT ILLNESS:  62-year-old lady referred to me of for evaluation of a right shoulder soft tissue lump.  She says is been at least several months it is getting larger is not causing any discomfort she noticed that whenever she was putting on her bra strap.  She has never had any redness or drainage in this area she has never had a lipoma removed before.  Because it is growing she had a concern and would like to have it removed.      Outpatient Encounter Medications as of 4/28/2022   Medication Sig Dispense Refill   • clonazePAM (KlonoPIN) 0.5 MG tablet Take 0.5 mg by mouth Every 12 (Twelve) Hours.     • DULoxetine (CYMBALTA) 30 MG capsule Take 30 mg by mouth every night at bedtime.     • DULoxetine (CYMBALTA) 60 MG capsule Take 60 mg by mouth Every Morning.     • fluticasone (FLONASE) 50 MCG/ACT nasal spray SPRAY 1 SPRAY INTO EACH NOSTRIL TWICE A DAY     • ibuprofen (ADVIL,MOTRIN) 200 MG tablet IBUPROFEN 200 MG TABS     • levothyroxine sodium (TIROSINT) 88 MCG capsule      • omeprazole (priLOSEC) 40 MG capsule TAKE ONE TABLET BY MOUTH 30-60 MINS BEFORE FIRST MEAL OF THE DAY     • Rexulti 2 MG tablet Take 1 tablet by mouth Every Morning.     • [DISCONTINUED] risperiDONE (RisperDAL) 0.5 MG tablet RISPERDAL 0.5 MG TABS     • [DISCONTINUED] sertraline (ZOLOFT) 50 MG tablet SERTRALINE HCL 50 MG TABS     • [DISCONTINUED] DULoxetine HCl (Drizalma Sprinkle) 60 MG capsule        No facility-administered encounter medications on file as of 4/28/2022.         The following portions of the patient's history were reviewed and updated as appropriate: allergies, current medications, past family " history, past medical history, past social history, past surgical history and problem list.    Review of Systems  Complete review of systems is positive for fever neck pain headaches and depression the remainder of the review of systems is negative  Objective   Physical Exam  Constitutional:       Appearance: Normal appearance.   HENT:      Head: Normocephalic and atraumatic.   Cardiovascular:      Rate and Rhythm: Normal rate.   Pulmonary:      Effort: Pulmonary effort is normal. No respiratory distress.   Skin:     General: Skin is warm and dry.      Comments: On the right shoulder there is approximately 3 cm soft tissue subcutaneous mobile nontender mass without any overlying skin changes   Neurological:      General: No focal deficit present.      Mental Status: She is alert. Mental status is at baseline.           Assessment/Plan   Diagnoses and all orders for this visit:    1. Lipoma of torso (Primary)  -     Case Request; Standing  -     COVID PRE-OP / PRE-PROCEDURE SCREENING ORDER (NO ISOLATION) - Swab, Nasopharynx; Future  -     Case Request    Other orders  -     Follow Anesthesia Guidelines / Standing Orders; Future  -     Provide NPO Instructions to Patient; Future  -     Chlorhexidine Skin Prep; Future    I believe that she has a lipoma of the right scapular region.  I counseled her about the natural history the treatment options observation versus surgery.  We talked of the steps of the operation to spitter cover the possible complications.  After our discussion about the risk she would like to have it removed.  We will go ahead and schedule this under monitored anesthesia.  This can be done in the lateral position side down.    Benjamin Munguia MD  4/28/2022  1:09 PM EDT    This note was created using Dragon Voice Recognition software.

## 2022-05-09 ENCOUNTER — HOSPITAL ENCOUNTER (OUTPATIENT)
Dept: CARDIOLOGY | Facility: HOSPITAL | Age: 62
Discharge: HOME | End: 2022-05-09

## 2022-05-09 ENCOUNTER — LAB (OUTPATIENT)
Dept: LAB | Facility: HOSPITAL | Age: 62
End: 2022-05-09

## 2022-05-09 DIAGNOSIS — D17.1 LIPOMA OF TORSO: ICD-10-CM

## 2022-05-09 LAB
DEPRECATED RDW RBC AUTO: 43.3 FL (ref 37–54)
ERYTHROCYTE [DISTWIDTH] IN BLOOD BY AUTOMATED COUNT: 13.5 % (ref 12.3–15.4)
HCT VFR BLD AUTO: 33.1 % (ref 34–46.6)
HGB BLD-MCNC: 10.6 G/DL (ref 12–15.9)
MCH RBC QN AUTO: 28.2 PG (ref 26.6–33)
MCHC RBC AUTO-ENTMCNC: 32 G/DL (ref 31.5–35.7)
MCV RBC AUTO: 88 FL (ref 79–97)
PLATELET # BLD AUTO: 375 10*3/MM3 (ref 140–450)
PMV BLD AUTO: 10.4 FL (ref 6–12)
RBC # BLD AUTO: 3.76 10*6/MM3 (ref 3.77–5.28)
SARS-COV-2 ORF1AB RESP QL NAA+PROBE: NOT DETECTED
WBC NRBC COR # BLD: 5.85 10*3/MM3 (ref 3.4–10.8)

## 2022-05-09 PROCEDURE — 85027 COMPLETE CBC AUTOMATED: CPT

## 2022-05-09 PROCEDURE — U0004 COV-19 TEST NON-CDC HGH THRU: HCPCS

## 2022-05-09 PROCEDURE — 93010 ELECTROCARDIOGRAM REPORT: CPT | Performed by: INTERNAL MEDICINE

## 2022-05-09 PROCEDURE — 93005 ELECTROCARDIOGRAM TRACING: CPT | Performed by: SURGERY

## 2022-05-09 PROCEDURE — C9803 HOPD COVID-19 SPEC COLLECT: HCPCS

## 2022-05-10 ENCOUNTER — ANESTHESIA EVENT (OUTPATIENT)
Dept: PERIOP | Facility: HOSPITAL | Age: 62
End: 2022-05-10

## 2022-05-10 LAB — QT INTERVAL: 347 MS

## 2022-05-10 NOTE — ANESTHESIA PREPROCEDURE EVALUATION
Anesthesia Evaluation     Patient summary reviewed and Nursing notes reviewed   history of anesthetic complications: PONV  NPO Solid Status: > 8 hours  NPO Liquid Status: > 8 hours           Airway   Dental      Pulmonary - negative pulmonary ROS   Cardiovascular - negative cardio ROS        Neuro/Psych  (+) headaches, psychiatric history Anxiety and Depression,    GI/Hepatic/Renal/Endo    (+)   thyroid problem hypothyroidism    Musculoskeletal     Abdominal    Substance History - negative use     OB/GYN negative ob/gyn ROS         Other   arthritis,      ROS/Med Hx Other: KLONOPIN                  Anesthesia Plan    ASA 2     general     intravenous induction     Anesthetic plan, all risks, benefits, and alternatives have been provided, discussed and informed consent has been obtained with: patient.    Plan discussed with CRNA and CAA.        CODE STATUS:

## 2022-05-11 ENCOUNTER — HOSPITAL ENCOUNTER (OUTPATIENT)
Facility: HOSPITAL | Age: 62
Setting detail: HOSPITAL OUTPATIENT SURGERY
Discharge: HOME | End: 2022-05-11
Attending: SURGERY | Admitting: SURGERY

## 2022-05-11 ENCOUNTER — ANESTHESIA (OUTPATIENT)
Dept: PERIOP | Facility: HOSPITAL | Age: 62
End: 2022-05-11

## 2022-05-11 VITALS
OXYGEN SATURATION: 100 % | HEIGHT: 60 IN | TEMPERATURE: 97.4 F | WEIGHT: 146 LBS | DIASTOLIC BLOOD PRESSURE: 72 MMHG | RESPIRATION RATE: 16 BRPM | BODY MASS INDEX: 28.66 KG/M2 | SYSTOLIC BLOOD PRESSURE: 134 MMHG | HEART RATE: 87 BPM

## 2022-05-11 DIAGNOSIS — D17.1 LIPOMA OF TORSO: ICD-10-CM

## 2022-05-11 PROCEDURE — 25010000002 LORAZEPAM PER 2 MG: Performed by: ANESTHESIOLOGY

## 2022-05-11 PROCEDURE — 25010000002 FENTANYL CITRATE (PF) 100 MCG/2ML SOLUTION: Performed by: ANESTHESIOLOGIST ASSISTANT

## 2022-05-11 PROCEDURE — 21931 EXC BACK LES SC 3 CM/>: CPT | Performed by: SURGERY

## 2022-05-11 PROCEDURE — 88304 TISSUE EXAM BY PATHOLOGIST: CPT | Performed by: SURGERY

## 2022-05-11 PROCEDURE — 25010000002 PROPOFOL 10 MG/ML EMULSION: Performed by: ANESTHESIOLOGIST ASSISTANT

## 2022-05-11 RX ORDER — LIDOCAINE HYDROCHLORIDE AND EPINEPHRINE 10; 10 MG/ML; UG/ML
INJECTION, SOLUTION INFILTRATION; PERINEURAL AS NEEDED
Status: DISCONTINUED | OUTPATIENT
Start: 2022-05-11 | End: 2022-05-11 | Stop reason: HOSPADM

## 2022-05-11 RX ORDER — MORPHINE SULFATE 4 MG/ML
2 INJECTION, SOLUTION INTRAMUSCULAR; INTRAVENOUS
Status: DISCONTINUED | OUTPATIENT
Start: 2022-05-11 | End: 2022-05-11 | Stop reason: HOSPADM

## 2022-05-11 RX ORDER — FLUMAZENIL 0.1 MG/ML
0.2 INJECTION INTRAVENOUS AS NEEDED
Status: DISCONTINUED | OUTPATIENT
Start: 2022-05-11 | End: 2022-05-11 | Stop reason: HOSPADM

## 2022-05-11 RX ORDER — FENTANYL CITRATE 50 UG/ML
50 INJECTION, SOLUTION INTRAMUSCULAR; INTRAVENOUS
Status: DISCONTINUED | OUTPATIENT
Start: 2022-05-11 | End: 2022-05-11 | Stop reason: HOSPADM

## 2022-05-11 RX ORDER — NALOXONE HCL 0.4 MG/ML
0.4 VIAL (ML) INJECTION AS NEEDED
Status: DISCONTINUED | OUTPATIENT
Start: 2022-05-11 | End: 2022-05-11 | Stop reason: HOSPADM

## 2022-05-11 RX ORDER — MEPERIDINE HYDROCHLORIDE 25 MG/ML
12.5 INJECTION INTRAMUSCULAR; INTRAVENOUS; SUBCUTANEOUS
Status: DISCONTINUED | OUTPATIENT
Start: 2022-05-11 | End: 2022-05-11 | Stop reason: HOSPADM

## 2022-05-11 RX ORDER — SODIUM CHLORIDE 0.9 % (FLUSH) 0.9 %
10 SYRINGE (ML) INJECTION EVERY 12 HOURS SCHEDULED
Status: DISCONTINUED | OUTPATIENT
Start: 2022-05-11 | End: 2022-05-11 | Stop reason: HOSPADM

## 2022-05-11 RX ORDER — HYDROCODONE BITARTRATE AND ACETAMINOPHEN 5; 325 MG/1; MG/1
1 TABLET ORAL EVERY 4 HOURS PRN
Qty: 6 TABLET | Refills: 0 | Status: SHIPPED | OUTPATIENT
Start: 2022-05-11 | End: 2022-06-02

## 2022-05-11 RX ORDER — MORPHINE SULFATE 4 MG/ML
1 INJECTION, SOLUTION INTRAMUSCULAR; INTRAVENOUS
Status: DISCONTINUED | OUTPATIENT
Start: 2022-05-11 | End: 2022-05-11 | Stop reason: HOSPADM

## 2022-05-11 RX ORDER — HYDRALAZINE HYDROCHLORIDE 20 MG/ML
5 INJECTION INTRAMUSCULAR; INTRAVENOUS
Status: DISCONTINUED | OUTPATIENT
Start: 2022-05-11 | End: 2022-05-11 | Stop reason: HOSPADM

## 2022-05-11 RX ORDER — HYDROCODONE BITARTRATE AND ACETAMINOPHEN 5; 325 MG/1; MG/1
1 TABLET ORAL ONCE AS NEEDED
Status: DISCONTINUED | OUTPATIENT
Start: 2022-05-11 | End: 2022-05-11 | Stop reason: HOSPADM

## 2022-05-11 RX ORDER — MORPHINE SULFATE 4 MG/ML
4 INJECTION, SOLUTION INTRAMUSCULAR; INTRAVENOUS
Status: DISCONTINUED | OUTPATIENT
Start: 2022-05-11 | End: 2022-05-11 | Stop reason: HOSPADM

## 2022-05-11 RX ORDER — SODIUM CHLORIDE, SODIUM LACTATE, POTASSIUM CHLORIDE, CALCIUM CHLORIDE 600; 310; 30; 20 MG/100ML; MG/100ML; MG/100ML; MG/100ML
9 INJECTION, SOLUTION INTRAVENOUS CONTINUOUS PRN
Status: DISCONTINUED | OUTPATIENT
Start: 2022-05-11 | End: 2022-05-11 | Stop reason: HOSPADM

## 2022-05-11 RX ORDER — IPRATROPIUM BROMIDE AND ALBUTEROL SULFATE 2.5; .5 MG/3ML; MG/3ML
3 SOLUTION RESPIRATORY (INHALATION) ONCE AS NEEDED
Status: DISCONTINUED | OUTPATIENT
Start: 2022-05-11 | End: 2022-05-11 | Stop reason: HOSPADM

## 2022-05-11 RX ORDER — SODIUM CHLORIDE 0.9 % (FLUSH) 0.9 %
10 SYRINGE (ML) INJECTION AS NEEDED
Status: DISCONTINUED | OUTPATIENT
Start: 2022-05-11 | End: 2022-05-11 | Stop reason: HOSPADM

## 2022-05-11 RX ORDER — FENTANYL CITRATE 50 UG/ML
INJECTION, SOLUTION INTRAMUSCULAR; INTRAVENOUS AS NEEDED
Status: DISCONTINUED | OUTPATIENT
Start: 2022-05-11 | End: 2022-05-11 | Stop reason: SURG

## 2022-05-11 RX ORDER — DICYCLOMINE HYDROCHLORIDE 10 MG/1
10 CAPSULE ORAL
COMMUNITY
End: 2022-06-02

## 2022-05-11 RX ORDER — SODIUM CHLORIDE 9 MG/ML
100 INJECTION, SOLUTION INTRAVENOUS CONTINUOUS
Status: DISCONTINUED | OUTPATIENT
Start: 2022-05-11 | End: 2022-05-11 | Stop reason: HOSPADM

## 2022-05-11 RX ORDER — CLINDAMYCIN PHOSPHATE 900 MG/50ML
900 INJECTION, SOLUTION INTRAVENOUS ONCE
Status: COMPLETED | OUTPATIENT
Start: 2022-05-11 | End: 2022-05-11

## 2022-05-11 RX ORDER — LABETALOL HYDROCHLORIDE 5 MG/ML
5 INJECTION, SOLUTION INTRAVENOUS
Status: DISCONTINUED | OUTPATIENT
Start: 2022-05-11 | End: 2022-05-11 | Stop reason: HOSPADM

## 2022-05-11 RX ORDER — FENTANYL CITRATE 50 UG/ML
25 INJECTION, SOLUTION INTRAMUSCULAR; INTRAVENOUS
Status: DISCONTINUED | OUTPATIENT
Start: 2022-05-11 | End: 2022-05-11 | Stop reason: HOSPADM

## 2022-05-11 RX ORDER — ONDANSETRON 2 MG/ML
4 INJECTION INTRAMUSCULAR; INTRAVENOUS ONCE AS NEEDED
Status: DISCONTINUED | OUTPATIENT
Start: 2022-05-11 | End: 2022-05-11 | Stop reason: HOSPADM

## 2022-05-11 RX ORDER — LORAZEPAM 2 MG/ML
1 INJECTION INTRAMUSCULAR ONCE
Status: COMPLETED | OUTPATIENT
Start: 2022-05-11 | End: 2022-05-11

## 2022-05-11 RX ADMIN — LORAZEPAM 1 MG: 2 INJECTION INTRAMUSCULAR; INTRAVENOUS at 09:06

## 2022-05-11 RX ADMIN — SODIUM CHLORIDE, SODIUM LACTATE, POTASSIUM CHLORIDE, AND CALCIUM CHLORIDE 9 ML/HR: .6; .31; .03; .02 INJECTION, SOLUTION INTRAVENOUS at 08:28

## 2022-05-11 RX ADMIN — FENTANYL CITRATE 50 MCG: 50 INJECTION, SOLUTION INTRAMUSCULAR; INTRAVENOUS at 09:47

## 2022-05-11 RX ADMIN — PROPOFOL 100 MCG/KG/MIN: 10 INJECTION, EMULSION INTRAVENOUS at 09:54

## 2022-05-11 RX ADMIN — CLINDAMYCIN PHOSPHATE 900 MG: 900 INJECTION, SOLUTION INTRAVENOUS at 09:54

## 2022-05-11 NOTE — OP NOTE
Operative Report:    Patient Name:  Gina Wright  YOB: 1960    Date of Surgery:  5/11/2022     Indications: 62-year-old lady who I met in the office with a chief complaint of a lipoma on her right shoulder posteriorly.  It was interfering with some of her straps on her close and did think that it was getting larger so I counseled her about the risk and benefits of removal she understood the risk and wished to proceed.    Pre-op Diagnosis:   Lipoma of torso [D17.1]       Post-Op Diagnosis Codes:     * Lipoma of torso [D17.1]    Procedure/CPT® Codes:      Procedure(s):  LIPOMA EXCISION from right shoulder 3 cm in aggregate    Staff:  Surgeon(s):  Benjamin Munguia MD    Circulator: Georgia Melara RN; Kareem Mcmahon RN  Scrub Person: Bethanie Abdul        Anesthesia: Monitored Anesthesia Care    Estimated Blood Loss: minimal    Implants:    Nothing was implanted during the procedure    Specimen:          Specimens     ID Source Type Tests Collected By Collected At Frozen?    A Shoulder, Right Tissue · TISSUE PATHOLOGY EXAM   Benjamin Munguia MD 5/11/22 0958 No    Description: Right shoulder lipoma               Findings: Approximately 3 cm poorly defined lipomatous material    Complications: None    Description of Procedure: Patient was taken to the operating placed in the left lateral decubitus position under monitored sedation her right posterior shoulder was prepped and draped in normal sterile fashion.  Timeouts performed identifying the patient receiving site of operation.  I began the operation by infiltrating skin subcutaneous tissue with local anesthetic and then made a transverse incision using a 15 blade scalpel dissection through subcutaneous tissue down to the lipomatous material was performed with the Bovie.  The lipoma itself had a more pale appearance than the surrounding subcutaneous fat but it was poorly defined.  I dissected it circumferentially using the Bovie down to the  level of the fascia.  There did not appear to be any mass or swelling below the level of the fascia.  The lipomatous material was removed piecemeal and sent in aggregate approximately 3 cm total dimension.  I irrigated the wound hemostasis was achieved with the Bovie I did not feel any additional lipomatous material closed the skin using interrupted 3-0 Vicryl suture running 4-0 Vicryl suture and skin glue.  She tolerated seizure well transferred to recovery in satisfactory condition.      Benjamin Munguia MD     Date: 5/11/2022  Time: 10:18 EDT    This note was created using Dragon Voice Recognition software.

## 2022-05-11 NOTE — ANESTHESIA POSTPROCEDURE EVALUATION
Patient: Gina Wright    Procedure Summary     Date: 05/11/22 Room / Location: [facility] OR 06 / [facility] MAIN OR    Anesthesia Start: 0944 Anesthesia Stop: 1020    Procedure: LIPOMA EXCISION from right shoulder (Right ) Diagnosis:       Lipoma of torso      (Lipoma of torso [D17.1])    Surgeons: Benjamin Munguia MD Provider: Amadeo Candelaria MD    Anesthesia Type: general ASA Status: 2          Anesthesia Type: general    Vitals  Vitals Value Taken Time   /72 05/11/22 1045   Temp 97.4 °F (36.3 °C) 05/11/22 1045   Pulse 87 05/11/22 1045   Resp 16 05/11/22 1045   SpO2 100 % 05/11/22 1045           Post Anesthesia Care and Evaluation    Patient location during evaluation: PACU  Patient participation: complete - patient participated  Level of consciousness: awake  Pain scale: See nurse's notes for pain score.  Pain management: adequate  Airway patency: patent  Anesthetic complications: No anesthetic complications  PONV Status: none  Cardiovascular status: acceptable  Respiratory status: acceptable  Hydration status: acceptable    Comments: Patient seen and examined postoperatively; vital signs stable; SpO2 greater than or equal to 90%; cardiopulmonary status stable; nausea/vomiting adequately controlled; pain adequately controlled; no apparent anesthesia complications; patient discharged from anesthesia care when discharge criteria were met

## 2022-05-12 ENCOUNTER — TELEPHONE (OUTPATIENT)
Dept: SURGERY | Facility: CLINIC | Age: 62
End: 2022-05-12

## 2022-05-12 LAB
LAB AP CASE REPORT: NORMAL
PATH REPORT.FINAL DX SPEC: NORMAL
PATH REPORT.GROSS SPEC: NORMAL

## 2022-05-13 NOTE — TELEPHONE ENCOUNTER
Pt called back. Stating shes still having some pain in her shoulder. Has taken all her pain meds. Informed to alternate between tylenol and ibuprofen and use ice packs. Stated understood. agnes 2 wk po appt will fu then. Knows to call with any questions or concerns.

## 2022-06-02 ENCOUNTER — OFFICE VISIT (OUTPATIENT)
Dept: SURGERY | Facility: CLINIC | Age: 62
End: 2022-06-02

## 2022-06-02 VITALS
HEIGHT: 60 IN | WEIGHT: 165.2 LBS | SYSTOLIC BLOOD PRESSURE: 126 MMHG | HEART RATE: 101 BPM | OXYGEN SATURATION: 96 % | BODY MASS INDEX: 32.43 KG/M2 | TEMPERATURE: 98 F | DIASTOLIC BLOOD PRESSURE: 81 MMHG

## 2022-06-02 DIAGNOSIS — D17.1 LIPOMA OF TORSO: Primary | ICD-10-CM

## 2022-06-02 PROCEDURE — 99024 POSTOP FOLLOW-UP VISIT: CPT | Performed by: SURGERY

## 2022-06-02 RX ORDER — COLESEVELAM 180 1/1
1250 TABLET ORAL 2 TIMES DAILY
COMMUNITY
Start: 2022-05-26 | End: 2022-12-09

## 2022-06-02 NOTE — PROGRESS NOTES
"Subjective   Gina Wright is a 62 y.o. female.   62-year-old lady who is now a few weeks out from excision of a right shoulder lipoma.  After the first couple days she said she had no pain.  No incisional issues.  She says that as she was feeling around she noticed another along her left chest wall which could be another lipoma.    Objective   /81 (Cuff Size: Adult)   Pulse 101   Temp 98 °F (36.7 °C) (Infrared)   Ht 152.4 cm (60\")   Wt 74.9 kg (165 lb 3.2 oz)   SpO2 96%   BMI 32.26 kg/m²   Physical Exam  On exam her right shoulder incision is healing well without erythema or drainage there is no evidence of mass below the incision.    On the left chest there is an approximately 2 cm soft tissue mass without any overlying skin changes  Assessment & Plan   Diagnoses and all orders for this visit:    1. Lipoma of torso (Primary)    She is recovering well from her removal of the right shoulder lipoma.  Pathology reviewed consistent with lipoma.  With regards to the left chest soft tissue mass unsure as to exact etiology but since she has had lipoma just removed and now found the spot is likely a lipoma.  We talked about some diagnostic uncertainty without any additional work-up like imaging or removal but for now since it is not causing him any symptoms I Dasia offer her a 3-month follow-up to check back in to see if it is changed and if she would like to have removed we will schedule surgery at that time.    Benjamin Munguia MD  6/2/2022  2:01 PM EDT    This note was created using Dragon Voice Recognition software.  "

## 2022-08-17 ENCOUNTER — OFFICE (AMBULATORY)
Dept: URBAN - METROPOLITAN AREA CLINIC 64 | Facility: CLINIC | Age: 62
End: 2022-08-17

## 2022-08-17 VITALS
DIASTOLIC BLOOD PRESSURE: 82 MMHG | SYSTOLIC BLOOD PRESSURE: 120 MMHG | HEIGHT: 60 IN | WEIGHT: 167 LBS | HEART RATE: 91 BPM

## 2022-08-17 DIAGNOSIS — R19.7 DIARRHEA, UNSPECIFIED: ICD-10-CM

## 2022-08-17 PROCEDURE — 99213 OFFICE O/P EST LOW 20 MIN: CPT | Performed by: NURSE PRACTITIONER

## 2022-08-17 RX ORDER — COLESEVELAM HYDROCHLORIDE 625 MG/1
TABLET, COATED ORAL
Qty: 120 | Status: COMPLETED
End: 2022-08-17

## 2022-08-17 RX ORDER — COLESTIPOL HYDROCHLORIDE 1 G/1
2 TABLET, FILM COATED ORAL
Qty: 60 | Refills: 11 | Status: COMPLETED
Start: 2022-08-17 | End: 2023-03-08

## 2022-11-09 ENCOUNTER — OFFICE (AMBULATORY)
Dept: URBAN - METROPOLITAN AREA CLINIC 64 | Facility: CLINIC | Age: 62
End: 2022-11-09

## 2022-11-09 VITALS
DIASTOLIC BLOOD PRESSURE: 70 MMHG | SYSTOLIC BLOOD PRESSURE: 107 MMHG | HEART RATE: 98 BPM | HEIGHT: 60 IN | WEIGHT: 178 LBS

## 2022-11-09 DIAGNOSIS — R14.0 ABDOMINAL DISTENSION (GASEOUS): ICD-10-CM

## 2022-11-09 DIAGNOSIS — D50.9 IRON DEFICIENCY ANEMIA, UNSPECIFIED: ICD-10-CM

## 2022-11-09 PROCEDURE — 99213 OFFICE O/P EST LOW 20 MIN: CPT | Performed by: NURSE PRACTITIONER

## 2022-11-09 RX ORDER — COLESTIPOL HYDROCHLORIDE 1 G/1
3 TABLET, FILM COATED ORAL
Qty: 90 | Refills: 11 | Status: COMPLETED
Start: 2022-11-09 | End: 2023-03-08

## 2022-12-07 ENCOUNTER — TELEPHONE (OUTPATIENT)
Dept: ENDOCRINOLOGY | Facility: CLINIC | Age: 62
End: 2022-12-07

## 2022-12-07 NOTE — TELEPHONE ENCOUNTER
Pty is on cx list for a sooner appt with Dr. Marrero. Attempted to contact pt to offer appt 12/9. Lm on pts vm.

## 2022-12-09 ENCOUNTER — OFFICE VISIT (OUTPATIENT)
Dept: ENDOCRINOLOGY | Facility: CLINIC | Age: 62
End: 2022-12-09

## 2022-12-09 ENCOUNTER — PATIENT ROUNDING (BHMG ONLY) (OUTPATIENT)
Dept: ENDOCRINOLOGY | Facility: CLINIC | Age: 62
End: 2022-12-09

## 2022-12-09 VITALS
BODY MASS INDEX: 35.93 KG/M2 | SYSTOLIC BLOOD PRESSURE: 122 MMHG | HEIGHT: 60 IN | WEIGHT: 183 LBS | OXYGEN SATURATION: 97 % | HEART RATE: 88 BPM | DIASTOLIC BLOOD PRESSURE: 80 MMHG

## 2022-12-09 DIAGNOSIS — R94.6 ABNORMAL THYROID FUNCTION TEST: Primary | ICD-10-CM

## 2022-12-09 PROCEDURE — 99204 OFFICE O/P NEW MOD 45 MIN: CPT | Performed by: INTERNAL MEDICINE

## 2022-12-09 RX ORDER — PANTOPRAZOLE SODIUM 40 MG/1
TABLET, DELAYED RELEASE ORAL
COMMUNITY
Start: 2022-09-20 | End: 2022-12-09

## 2022-12-09 RX ORDER — MONTELUKAST SODIUM 4 MG/1
TABLET, CHEWABLE ORAL
COMMUNITY
Start: 2022-11-03

## 2022-12-09 RX ORDER — LEVOTHYROXINE SODIUM 88 UG/1
TABLET ORAL
COMMUNITY
Start: 2022-10-11 | End: 2022-12-09

## 2022-12-09 RX ORDER — EZETIMIBE 10 MG/1
90 TABLET ORAL DAILY
COMMUNITY
Start: 2022-09-21

## 2022-12-09 NOTE — PROGRESS NOTES
December 9, 2022    Hello, may I speak with Gina Wright?    My name is Ashley     I am  with [facility]  [address] [place] [address].    Before we get started may I verify your date of birth? 1960    I am calling to officially welcome you to our practice and ask about your recent visit. Is this a good time to talk? yes    Tell me about your visit with us. What things went well?  It was excellent. He was very efficient. Marta was wonderful. Everything went very well.       We're always looking for ways to make our patients' experiences even better. Do you have recommendations on ways we may improve?  no    Overall were you satisfied with your first visit to our practice? Yes, just wishes our lab person had been available to draw blood today instead of having to come back on Monday.       I appreciate you taking the time to speak with me today. Is there anything else I can do for you? no      Thank you, and have a great day.

## 2022-12-09 NOTE — PROGRESS NOTES
Endocrine Consult Outpatient  Referred by MsClive Rosemarie Peng NP for abnormal thyroid function test  Patient Care Team:  Rosemarie Peng APRN as PCP - General (Nurse Practitioner)  Benjamin Munguia MD as Consulting Physician (General Surgery)     Chief Complaint: Abnormal thyroid function test        HPI: This is a 62-year-old female with history of hyperlipidemia and hypothyroidism is referred because of abnormal thyroid function test.  She tells me that she was diagnosed with hypothyroidism about 4 years ago with a blood test through her primary care physician at that time.  She has been on levothyroxine all these here until about September 2022.  She started having some hot flashes and not feeling well and she discussed with her primary care and they asked her to stop levothyroxine and she is now referred here for further evaluation management.  She tells me that for most part her symptoms have improved since she stopped levothyroxine.    Past Medical History:   Diagnosis Date   • Anesthesia complication     pt states she needs extra d/t red hair   • Arthritis    • Depressive disorder    • Hypothyroid    • Migraine    • MRSA infection     on hands   • PONV (postoperative nausea and vomiting)        Social History     Socioeconomic History   • Marital status:    • Number of children: 2   • Years of education: 14   Tobacco Use   • Smoking status: Never   • Smokeless tobacco: Never   Vaping Use   • Vaping Use: Never used   Substance and Sexual Activity   • Alcohol use: Not Currently   • Drug use: Not Currently   • Sexual activity: Defer       Family History   Problem Relation Age of Onset   • Stroke Mother    • Mental illness Father    • Heart disease Father        Allergies   Allergen Reactions   • Compazine [Prochlorperazine] Anxiety   • Ferumoxytol Anaphylaxis     Within 8 mins of infusion patient flushed including arms, diaphoretic, nauseous, chest and back pain.    • Lac Bovis Diarrhea   • Dairy Aid  [Tilactase] Nausea Only   • Eggs Or Egg-Derived Products Nausea Only   • Iron Other (See Comments)     Flu like symptoms   • Prochlorperazine Edisylate Other (See Comments)     jitters   • Sulfa Antibiotics Other (See Comments)     Feels like I'm going to die.  Internal pain.   • Trazodone Other (See Comments)     Unable to sleep   • Ciprofloxacin Rash     CAUSES AGITATION   • Tomato Rash     Rash of face       ROS:   Constitutional:  Denies fatigue, tiredness.    Eyes:  Denies change in visual acuity   HENT:  Denies nasal congestion or sore throat   Respiratory: denies cough, shortness of breath.   Cardiovascular:  denies chest pain, edema   GI:  Denies abdominal pain, nausea, vomiting.    :  Denies dysuria   Musculoskeletal:  Denies back pain or joint pain   Integument:  Denies dry skin, rash   Neurologic:  Denies headache, focal weakness or sensory changes   Endocrine:  Denies polyuria or polydipsia   Psychiatric:  Denies depression or anxiety      Vitals:    12/09/22 1259   BP: 122/80   Pulse: 88   SpO2: 97%     Body mass index is 35.74 kg/m².      Physical Exam:  GEN: NAD, conversant  EYES: EOMI, PERRL, no conjunctival erythema  NECK: no thyromegaly, full ROM   CV: RRR, no murmurs/rubs/gallops, no peripheral edema  LUNG: CTAB, no wheezes/rales/ronchi  SKIN: no rashes, no acanthosis  MSK: no deformities, full ROM of all extremities  NEURO: no tremors, DTR normal  PSYCH: AOX3, appropriate mood, affect normal      Results Review:     I reviewed the patient's new clinical results.    Lab Results   Component Value Date    GLUCOSE 93 12/16/2017    BUN 11 06/15/2021    CREATININE 0.6 (L) 06/15/2021    BCR 18.0 06/15/2021    K 4.8 06/15/2021    CO2 27 06/15/2021    CALCIUM 10.0 06/15/2021    ALBUMIN 4.8 06/15/2021    LABIL2 2.3 06/15/2021    AST 15 06/15/2021    ALT 12 (L) 06/15/2021    TRIG 108 08/17/2020    HDL 62 08/17/2020     (H) 08/17/2020       Lab Results   Component Value Date    CREATININE 0.6 (L)  06/15/2021     Lab Results   Component Value Date    TSH 0.082 (L) 11/18/2021    FREET4 1.26 08/17/2020     Labs from August 9, 2022 showed a TSH of 1.36  Serum sodium was 135, potassium 4.2, chloride 98, CO2 21, glucose 79, BUN 11, creatinine 1.75, AST 18 and ALT 13.    T4, FREE (08/17/2020 11:52)   TSH (08/17/2020 11:52)   TSH (03/31/2021 10:39)   TSH (06/15/2021 15:18)   TSH (11/18/2021 15:26)     Medication Review: Reviewed.       Current Outpatient Medications:   •  clonazePAM (KlonoPIN) 0.5 MG tablet, Take 0.5 mg by mouth Every 12 (Twelve) Hours., Disp: , Rfl:   •  colestipol (COLESTID) 1 g tablet, , Disp: , Rfl:   •  DULoxetine (CYMBALTA) 60 MG capsule, Take 60 mg by mouth Every Morning., Disp: , Rfl:   •  ezetimibe (ZETIA) 10 MG tablet, 90 mg Daily., Disp: , Rfl:   •  omeprazole (priLOSEC) 40 MG capsule, TAKE ONE TABLET BY MOUTH 30-60 MINS BEFORE FIRST MEAL OF THE DAY, Disp: , Rfl:   •  Rexulti 2 MG tablet, Take 1 tablet by mouth Every Morning., Disp: , Rfl:         Assessment and plan:  Abnormal thyroid function test: Uncontrolled, this is a 62-year-old female who was initially diagnosed with hypothyroidism about 4 years ago for the blood test.  She has been on levothyroxine up until September 2022 when she was having some symptoms of hot flashes and not feeling well.  She did have serial TSHs which were low.  In September she stopped levothyroxine on the instruction of her primary care and is now referred here for further evaluation management.  She tells me the symptoms have improved since she stopped levothyroxine.  She is been off levothyroxine since September 2022.  I will check TSH with free T4 and free T3 along with TSI and TPO antibodies now and then make further recommendations.    Thank you very much for the consultation.    Tan Marrero MD FACE.

## 2022-12-21 ENCOUNTER — TELEPHONE (OUTPATIENT)
Dept: ENDOCRINOLOGY | Facility: CLINIC | Age: 62
End: 2022-12-21

## 2022-12-21 NOTE — TELEPHONE ENCOUNTER
Tried to call patient regarding lab closure. No answer left vm [facility] or contact our office and let know where to send the lab orders.   Lab is now cx'ed

## 2022-12-29 ENCOUNTER — LAB (OUTPATIENT)
Dept: LAB | Facility: HOSPITAL | Age: 62
End: 2022-12-29
Payer: MEDICARE

## 2022-12-29 DIAGNOSIS — R94.6 ABNORMAL THYROID FUNCTION TEST: ICD-10-CM

## 2022-12-29 LAB
ALBUMIN SERPL-MCNC: 4.5 G/DL (ref 3.5–5.2)
ALBUMIN/GLOB SERPL: 2.3 G/DL
ALP SERPL-CCNC: 97 U/L (ref 39–117)
ALT SERPL W P-5'-P-CCNC: 24 U/L (ref 1–33)
ANION GAP SERPL CALCULATED.3IONS-SCNC: 11.2 MMOL/L (ref 5–15)
AST SERPL-CCNC: 23 U/L (ref 1–32)
BASOPHILS # BLD AUTO: 0.03 10*3/MM3 (ref 0–0.2)
BASOPHILS NFR BLD AUTO: 0.5 % (ref 0–1.5)
BILIRUB SERPL-MCNC: 0.3 MG/DL (ref 0–1.2)
BUN SERPL-MCNC: 13 MG/DL (ref 8–23)
BUN/CREAT SERPL: 15.7 (ref 7–25)
CALCIUM SPEC-SCNC: 9.1 MG/DL (ref 8.6–10.5)
CHLORIDE SERPL-SCNC: 104 MMOL/L (ref 98–107)
CO2 SERPL-SCNC: 25.8 MMOL/L (ref 22–29)
CREAT SERPL-MCNC: 0.83 MG/DL (ref 0.57–1)
DEPRECATED RDW RBC AUTO: 43.9 FL (ref 37–54)
EGFRCR SERPLBLD CKD-EPI 2021: 80.8 ML/MIN/1.73
EOSINOPHIL # BLD AUTO: 0.08 10*3/MM3 (ref 0–0.4)
EOSINOPHIL NFR BLD AUTO: 1.3 % (ref 0.3–6.2)
ERYTHROCYTE [DISTWIDTH] IN BLOOD BY AUTOMATED COUNT: 14.2 % (ref 12.3–15.4)
GLOBULIN UR ELPH-MCNC: 2 GM/DL
GLUCOSE SERPL-MCNC: 100 MG/DL (ref 65–99)
HCT VFR BLD AUTO: 30.5 % (ref 34–46.6)
HGB BLD-MCNC: 10.1 G/DL (ref 12–15.9)
IMM GRANULOCYTES # BLD AUTO: 0.02 10*3/MM3 (ref 0–0.05)
IMM GRANULOCYTES NFR BLD AUTO: 0.3 % (ref 0–0.5)
LYMPHOCYTES # BLD AUTO: 2.29 10*3/MM3 (ref 0.7–3.1)
LYMPHOCYTES NFR BLD AUTO: 38.2 % (ref 19.6–45.3)
MCH RBC QN AUTO: 28.3 PG (ref 26.6–33)
MCHC RBC AUTO-ENTMCNC: 33.1 G/DL (ref 31.5–35.7)
MCV RBC AUTO: 85.4 FL (ref 79–97)
MONOCYTES # BLD AUTO: 0.29 10*3/MM3 (ref 0.1–0.9)
MONOCYTES NFR BLD AUTO: 4.8 % (ref 5–12)
NEUTROPHILS NFR BLD AUTO: 3.29 10*3/MM3 (ref 1.7–7)
NEUTROPHILS NFR BLD AUTO: 54.9 % (ref 42.7–76)
NRBC BLD AUTO-RTO: 0 /100 WBC (ref 0–0.2)
PLATELET # BLD AUTO: 317 10*3/MM3 (ref 140–450)
PMV BLD AUTO: 11.1 FL (ref 6–12)
POTASSIUM SERPL-SCNC: 3.6 MMOL/L (ref 3.5–5.2)
PROT SERPL-MCNC: 6.5 G/DL (ref 6–8.5)
RBC # BLD AUTO: 3.57 10*6/MM3 (ref 3.77–5.28)
SODIUM SERPL-SCNC: 141 MMOL/L (ref 136–145)
T3FREE SERPL-MCNC: 2.92 PG/ML (ref 2–4.4)
T4 FREE SERPL-MCNC: 0.75 NG/DL (ref 0.93–1.7)
TSH SERPL DL<=0.05 MIU/L-ACNC: 2.18 UIU/ML (ref 0.27–4.2)
WBC NRBC COR # BLD: 6 10*3/MM3 (ref 3.4–10.8)

## 2022-12-29 PROCEDURE — 36415 COLL VENOUS BLD VENIPUNCTURE: CPT

## 2022-12-29 PROCEDURE — 84443 ASSAY THYROID STIM HORMONE: CPT

## 2022-12-29 PROCEDURE — 84439 ASSAY OF FREE THYROXINE: CPT

## 2022-12-29 PROCEDURE — 86376 MICROSOMAL ANTIBODY EACH: CPT

## 2022-12-29 PROCEDURE — 84481 FREE ASSAY (FT-3): CPT

## 2022-12-29 PROCEDURE — 80053 COMPREHEN METABOLIC PANEL: CPT

## 2022-12-29 PROCEDURE — 84445 ASSAY OF TSI GLOBULIN: CPT

## 2022-12-29 PROCEDURE — 85025 COMPLETE CBC W/AUTO DIFF WBC: CPT

## 2022-12-30 LAB — THYROPEROXIDASE AB SERPL-ACNC: 12 IU/ML (ref 0–34)

## 2022-12-31 LAB — TSI SER-ACNC: <0.1 IU/L (ref 0–0.55)

## 2023-01-11 DIAGNOSIS — R94.6 ABNORMAL THYROID FUNCTION TEST: Primary | ICD-10-CM

## 2023-03-08 ENCOUNTER — OFFICE (AMBULATORY)
Dept: URBAN - METROPOLITAN AREA CLINIC 64 | Facility: CLINIC | Age: 63
End: 2023-03-08

## 2023-03-08 VITALS
HEART RATE: 96 BPM | DIASTOLIC BLOOD PRESSURE: 81 MMHG | HEIGHT: 60 IN | SYSTOLIC BLOOD PRESSURE: 120 MMHG | WEIGHT: 179 LBS

## 2023-03-08 DIAGNOSIS — R74.8 ABNORMAL LEVELS OF OTHER SERUM ENZYMES: ICD-10-CM

## 2023-03-08 DIAGNOSIS — R19.4 CHANGE IN BOWEL HABIT: ICD-10-CM

## 2023-03-08 DIAGNOSIS — R10.84 GENERALIZED ABDOMINAL PAIN: ICD-10-CM

## 2023-03-08 DIAGNOSIS — D50.9 IRON DEFICIENCY ANEMIA, UNSPECIFIED: ICD-10-CM

## 2023-03-08 DIAGNOSIS — R14.0 ABDOMINAL DISTENSION (GASEOUS): ICD-10-CM

## 2023-03-08 PROCEDURE — 99213 OFFICE O/P EST LOW 20 MIN: CPT | Performed by: NURSE PRACTITIONER

## 2023-04-19 ENCOUNTER — TELEPHONE (OUTPATIENT)
Dept: ENDOCRINOLOGY | Facility: CLINIC | Age: 63
End: 2023-04-19
Payer: MEDICARE

## 2023-04-19 NOTE — TELEPHONE ENCOUNTER
Pt called states she has been sweating, heart racing, cold feet, temp lower than normal and night sweats. Pt is coming in Monday to do labs that she missed due to missing her appt.

## 2023-04-20 NOTE — TELEPHONE ENCOUNTER
Tan Marrero MD  You 7 hours ago (9:06 AM)       Check TSH, free T4.      Pt notified, verbalized understanding.

## 2023-04-24 ENCOUNTER — LAB (OUTPATIENT)
Dept: LAB | Facility: HOSPITAL | Age: 63
End: 2023-04-24
Payer: MEDICARE

## 2023-04-24 DIAGNOSIS — R94.6 ABNORMAL THYROID FUNCTION TEST: ICD-10-CM

## 2023-04-24 LAB
T4 FREE SERPL-MCNC: 0.82 NG/DL (ref 0.93–1.7)
TSH SERPL DL<=0.05 MIU/L-ACNC: 3.76 UIU/ML (ref 0.27–4.2)

## 2023-04-24 PROCEDURE — 84439 ASSAY OF FREE THYROXINE: CPT

## 2023-04-24 PROCEDURE — 36415 COLL VENOUS BLD VENIPUNCTURE: CPT

## 2023-04-24 PROCEDURE — 84443 ASSAY THYROID STIM HORMONE: CPT

## 2023-04-28 ENCOUNTER — TELEPHONE (OUTPATIENT)
Dept: ENDOCRINOLOGY | Facility: CLINIC | Age: 63
End: 2023-04-28
Payer: MEDICARE

## 2023-05-01 DIAGNOSIS — R94.6 ABNORMAL THYROID FUNCTION TEST: Primary | ICD-10-CM

## 2023-05-26 ENCOUNTER — HOSPITAL ENCOUNTER (OUTPATIENT)
Facility: HOSPITAL | Age: 63
Setting detail: OBSERVATION
Discharge: NURSING, REHAB OR LONG-TERM CARE | End: 2023-05-27
Attending: EMERGENCY MEDICINE | Admitting: EMERGENCY MEDICINE
Payer: MEDICARE

## 2023-05-26 ENCOUNTER — APPOINTMENT (OUTPATIENT)
Dept: GENERAL RADIOLOGY | Facility: HOSPITAL | Age: 63
End: 2023-05-26
Payer: MEDICARE

## 2023-05-26 DIAGNOSIS — R73.09 ELEVATED HEMOGLOBIN A1C: ICD-10-CM

## 2023-05-26 DIAGNOSIS — R94.39 ABNORMAL NUCLEAR STRESS TEST: ICD-10-CM

## 2023-05-26 DIAGNOSIS — I20.0 UNSTABLE ANGINA PECTORIS: ICD-10-CM

## 2023-05-26 DIAGNOSIS — R07.9 CHEST PAIN, UNSPECIFIED TYPE: Primary | ICD-10-CM

## 2023-05-26 LAB
ALBUMIN SERPL-MCNC: 4.4 G/DL (ref 3.5–5.2)
ALBUMIN/GLOB SERPL: 1.8 G/DL
ALP SERPL-CCNC: 111 U/L (ref 39–117)
ALT SERPL W P-5'-P-CCNC: 17 U/L (ref 1–33)
ANION GAP SERPL CALCULATED.3IONS-SCNC: 10 MMOL/L (ref 5–15)
APTT PPP: 24.8 SECONDS (ref 61–76.5)
AST SERPL-CCNC: 17 U/L (ref 1–32)
B PARAPERT DNA SPEC QL NAA+PROBE: NOT DETECTED
B PERT DNA SPEC QL NAA+PROBE: NOT DETECTED
BASOPHILS # BLD AUTO: 0 10*3/MM3 (ref 0–0.2)
BASOPHILS NFR BLD AUTO: 0.5 % (ref 0–1.5)
BILIRUB SERPL-MCNC: 0.2 MG/DL (ref 0–1.2)
BUN SERPL-MCNC: 9 MG/DL (ref 8–23)
BUN/CREAT SERPL: 10.8 (ref 7–25)
C PNEUM DNA NPH QL NAA+NON-PROBE: NOT DETECTED
CALCIUM SPEC-SCNC: 9.6 MG/DL (ref 8.6–10.5)
CHLORIDE SERPL-SCNC: 106 MMOL/L (ref 98–107)
CO2 SERPL-SCNC: 27 MMOL/L (ref 22–29)
CREAT SERPL-MCNC: 0.83 MG/DL (ref 0.57–1)
D DIMER PPP FEU-MCNC: 0.42 MG/L (FEU) (ref 0–0.61)
DEPRECATED RDW RBC AUTO: 47.3 FL (ref 37–54)
EGFRCR SERPLBLD CKD-EPI 2021: 80.3 ML/MIN/1.73
EOSINOPHIL # BLD AUTO: 0.1 10*3/MM3 (ref 0–0.4)
EOSINOPHIL NFR BLD AUTO: 1.1 % (ref 0.3–6.2)
ERYTHROCYTE [DISTWIDTH] IN BLOOD BY AUTOMATED COUNT: 15.8 % (ref 12.3–15.4)
FLUAV SUBTYP SPEC NAA+PROBE: NOT DETECTED
FLUBV RNA ISLT QL NAA+PROBE: NOT DETECTED
GLOBULIN UR ELPH-MCNC: 2.5 GM/DL
GLUCOSE SERPL-MCNC: 108 MG/DL (ref 65–99)
HADV DNA SPEC NAA+PROBE: NOT DETECTED
HCOV 229E RNA SPEC QL NAA+PROBE: NOT DETECTED
HCOV HKU1 RNA SPEC QL NAA+PROBE: NOT DETECTED
HCOV NL63 RNA SPEC QL NAA+PROBE: NOT DETECTED
HCOV OC43 RNA SPEC QL NAA+PROBE: NOT DETECTED
HCT VFR BLD AUTO: 33.3 % (ref 34–46.6)
HGB BLD-MCNC: 10.9 G/DL (ref 12–15.9)
HMPV RNA NPH QL NAA+NON-PROBE: NOT DETECTED
HPIV1 RNA ISLT QL NAA+PROBE: NOT DETECTED
HPIV2 RNA SPEC QL NAA+PROBE: NOT DETECTED
HPIV3 RNA NPH QL NAA+PROBE: NOT DETECTED
HPIV4 P GENE NPH QL NAA+PROBE: NOT DETECTED
INR PPP: 0.95 (ref 0.93–1.1)
LYMPHOCYTES # BLD AUTO: 1.8 10*3/MM3 (ref 0.7–3.1)
LYMPHOCYTES NFR BLD AUTO: 26.4 % (ref 19.6–45.3)
M PNEUMO IGG SER IA-ACNC: NOT DETECTED
MCH RBC QN AUTO: 28.4 PG (ref 26.6–33)
MCHC RBC AUTO-ENTMCNC: 32.8 G/DL (ref 31.5–35.7)
MCV RBC AUTO: 86.4 FL (ref 79–97)
MONOCYTES # BLD AUTO: 0.3 10*3/MM3 (ref 0.1–0.9)
MONOCYTES NFR BLD AUTO: 4.6 % (ref 5–12)
NEUTROPHILS NFR BLD AUTO: 4.7 10*3/MM3 (ref 1.7–7)
NEUTROPHILS NFR BLD AUTO: 67.4 % (ref 42.7–76)
NRBC BLD AUTO-RTO: 0 /100 WBC (ref 0–0.2)
NT-PROBNP SERPL-MCNC: <36 PG/ML (ref 0–900)
PLATELET # BLD AUTO: 358 10*3/MM3 (ref 140–450)
PMV BLD AUTO: 8.4 FL (ref 6–12)
POTASSIUM SERPL-SCNC: 4.1 MMOL/L (ref 3.5–5.2)
PROT SERPL-MCNC: 6.9 G/DL (ref 6–8.5)
PROTHROMBIN TIME: 10.2 SECONDS (ref 9.6–11.7)
RBC # BLD AUTO: 3.85 10*6/MM3 (ref 3.77–5.28)
RHINOVIRUS RNA SPEC NAA+PROBE: NOT DETECTED
RSV RNA NPH QL NAA+NON-PROBE: NOT DETECTED
SARS-COV-2 RNA NPH QL NAA+NON-PROBE: NOT DETECTED
SODIUM SERPL-SCNC: 143 MMOL/L (ref 136–145)
T4 FREE SERPL-MCNC: 0.88 NG/DL (ref 0.93–1.7)
TROPONIN T SERPL HS-MCNC: <6 NG/L
TSH SERPL DL<=0.05 MIU/L-ACNC: 3.83 UIU/ML (ref 0.27–4.2)
WBC NRBC COR # BLD: 7 10*3/MM3 (ref 3.4–10.8)

## 2023-05-26 PROCEDURE — 80053 COMPREHEN METABOLIC PANEL: CPT | Performed by: EMERGENCY MEDICINE

## 2023-05-26 PROCEDURE — 84484 ASSAY OF TROPONIN QUANT: CPT | Performed by: EMERGENCY MEDICINE

## 2023-05-26 PROCEDURE — G0378 HOSPITAL OBSERVATION PER HR: HCPCS

## 2023-05-26 PROCEDURE — 93005 ELECTROCARDIOGRAM TRACING: CPT | Performed by: EMERGENCY MEDICINE

## 2023-05-26 PROCEDURE — 85610 PROTHROMBIN TIME: CPT | Performed by: EMERGENCY MEDICINE

## 2023-05-26 PROCEDURE — 83880 ASSAY OF NATRIURETIC PEPTIDE: CPT | Performed by: EMERGENCY MEDICINE

## 2023-05-26 PROCEDURE — 99285 EMERGENCY DEPT VISIT HI MDM: CPT

## 2023-05-26 PROCEDURE — 71045 X-RAY EXAM CHEST 1 VIEW: CPT

## 2023-05-26 PROCEDURE — 93005 ELECTROCARDIOGRAM TRACING: CPT

## 2023-05-26 PROCEDURE — 84439 ASSAY OF FREE THYROXINE: CPT | Performed by: NURSE PRACTITIONER

## 2023-05-26 PROCEDURE — 0202U NFCT DS 22 TRGT SARS-COV-2: CPT | Performed by: NURSE PRACTITIONER

## 2023-05-26 PROCEDURE — 36415 COLL VENOUS BLD VENIPUNCTURE: CPT

## 2023-05-26 PROCEDURE — 85730 THROMBOPLASTIN TIME PARTIAL: CPT | Performed by: EMERGENCY MEDICINE

## 2023-05-26 PROCEDURE — 85379 FIBRIN DEGRADATION QUANT: CPT | Performed by: EMERGENCY MEDICINE

## 2023-05-26 PROCEDURE — 84443 ASSAY THYROID STIM HORMONE: CPT | Performed by: NURSE PRACTITIONER

## 2023-05-26 PROCEDURE — 85025 COMPLETE CBC W/AUTO DIFF WBC: CPT | Performed by: EMERGENCY MEDICINE

## 2023-05-26 RX ORDER — ZOLPIDEM TARTRATE 5 MG/1
5 TABLET ORAL NIGHTLY PRN
Status: DISCONTINUED | OUTPATIENT
Start: 2023-05-26 | End: 2023-05-27 | Stop reason: HOSPADM

## 2023-05-26 RX ORDER — ASPIRIN 325 MG
325 TABLET ORAL ONCE
Status: COMPLETED | OUTPATIENT
Start: 2023-05-26 | End: 2023-05-26

## 2023-05-26 RX ORDER — POLYETHYLENE GLYCOL 3350 17 G/17G
17 POWDER, FOR SOLUTION ORAL DAILY PRN
Status: DISCONTINUED | OUTPATIENT
Start: 2023-05-26 | End: 2023-05-27 | Stop reason: HOSPADM

## 2023-05-26 RX ORDER — CETIRIZINE HYDROCHLORIDE 10 MG/1
10 TABLET ORAL DAILY
Status: DISCONTINUED | OUTPATIENT
Start: 2023-05-26 | End: 2023-05-27 | Stop reason: HOSPADM

## 2023-05-26 RX ORDER — ONDANSETRON 4 MG/1
4 TABLET, FILM COATED ORAL EVERY 6 HOURS PRN
Status: DISCONTINUED | OUTPATIENT
Start: 2023-05-26 | End: 2023-05-27 | Stop reason: HOSPADM

## 2023-05-26 RX ORDER — BISACODYL 5 MG/1
5 TABLET, DELAYED RELEASE ORAL DAILY PRN
Status: DISCONTINUED | OUTPATIENT
Start: 2023-05-26 | End: 2023-05-27 | Stop reason: HOSPADM

## 2023-05-26 RX ORDER — PANTOPRAZOLE SODIUM 40 MG/1
40 TABLET, DELAYED RELEASE ORAL
Status: DISCONTINUED | OUTPATIENT
Start: 2023-05-27 | End: 2023-05-27 | Stop reason: HOSPADM

## 2023-05-26 RX ORDER — ACETAMINOPHEN 325 MG/1
650 TABLET ORAL EVERY 4 HOURS PRN
Status: DISCONTINUED | OUTPATIENT
Start: 2023-05-26 | End: 2023-05-27 | Stop reason: HOSPADM

## 2023-05-26 RX ORDER — BISACODYL 10 MG
10 SUPPOSITORY, RECTAL RECTAL DAILY PRN
Status: DISCONTINUED | OUTPATIENT
Start: 2023-05-26 | End: 2023-05-27 | Stop reason: HOSPADM

## 2023-05-26 RX ORDER — CHOLECALCIFEROL (VITAMIN D3) 125 MCG
10 CAPSULE ORAL NIGHTLY
Status: DISCONTINUED | OUTPATIENT
Start: 2023-05-26 | End: 2023-05-27

## 2023-05-26 RX ORDER — SODIUM CHLORIDE 9 MG/ML
40 INJECTION, SOLUTION INTRAVENOUS AS NEEDED
Status: DISCONTINUED | OUTPATIENT
Start: 2023-05-26 | End: 2023-05-27 | Stop reason: HOSPADM

## 2023-05-26 RX ORDER — DULOXETIN HYDROCHLORIDE 30 MG/1
90 CAPSULE, DELAYED RELEASE ORAL EVERY MORNING
Status: DISCONTINUED | OUTPATIENT
Start: 2023-05-27 | End: 2023-05-27 | Stop reason: HOSPADM

## 2023-05-26 RX ORDER — GUAIFENESIN 600 MG/1
600 TABLET, EXTENDED RELEASE ORAL EVERY 12 HOURS SCHEDULED
Status: DISCONTINUED | OUTPATIENT
Start: 2023-05-26 | End: 2023-05-27 | Stop reason: HOSPADM

## 2023-05-26 RX ORDER — SODIUM CHLORIDE 0.9 % (FLUSH) 0.9 %
10 SYRINGE (ML) INJECTION EVERY 12 HOURS SCHEDULED
Status: DISCONTINUED | OUTPATIENT
Start: 2023-05-26 | End: 2023-05-27 | Stop reason: HOSPADM

## 2023-05-26 RX ORDER — SODIUM CHLORIDE 0.9 % (FLUSH) 0.9 %
10 SYRINGE (ML) INJECTION AS NEEDED
Status: DISCONTINUED | OUTPATIENT
Start: 2023-05-26 | End: 2023-05-27 | Stop reason: HOSPADM

## 2023-05-26 RX ORDER — SUCRALFATE 1 G/1
1 TABLET ORAL
Status: DISCONTINUED | OUTPATIENT
Start: 2023-05-26 | End: 2023-05-27 | Stop reason: HOSPADM

## 2023-05-26 RX ORDER — ONDANSETRON 2 MG/ML
4 INJECTION INTRAMUSCULAR; INTRAVENOUS EVERY 6 HOURS PRN
Status: DISCONTINUED | OUTPATIENT
Start: 2023-05-26 | End: 2023-05-27 | Stop reason: HOSPADM

## 2023-05-26 RX ORDER — AMOXICILLIN 250 MG
2 CAPSULE ORAL 2 TIMES DAILY
Status: DISCONTINUED | OUTPATIENT
Start: 2023-05-26 | End: 2023-05-27 | Stop reason: HOSPADM

## 2023-05-26 RX ADMIN — Medication 10 ML: at 20:33

## 2023-05-26 RX ADMIN — SUCRALFATE 1 G: 1 TABLET ORAL at 20:33

## 2023-05-26 RX ADMIN — ASPIRIN 325 MG ORAL TABLET 325 MG: 325 PILL ORAL at 13:45

## 2023-05-26 RX ADMIN — SUCRALFATE 1 G: 1 TABLET ORAL at 17:18

## 2023-05-26 RX ADMIN — Medication 10 MG: at 20:33

## 2023-05-26 RX ADMIN — CETIRIZINE HYDROCHLORIDE 10 MG: 10 TABLET, FILM COATED ORAL at 17:18

## 2023-05-26 RX ADMIN — ALUMINUM HYDROXIDE, MAGNESIUM HYDROXIDE, AND DIMETHICONE: 400; 400; 40 SUSPENSION ORAL at 17:17

## 2023-05-26 RX ADMIN — ACETAMINOPHEN 650 MG: 325 TABLET, FILM COATED ORAL at 22:01

## 2023-05-26 RX ADMIN — GUAIFENESIN 600 MG: 600 TABLET, EXTENDED RELEASE ORAL at 20:33

## 2023-05-26 NOTE — Clinical Note
Prepped: right groin and Right Wrist. Prepped with: ChloraPrep and Hibiclens. The site was clipped. The patient was draped in a sterile fashion.

## 2023-05-26 NOTE — Clinical Note
catheter removed  over the wire. documentation/interpretation of diagnostic studies/consultation with other physicians/direct patient care (not related to procedure)/additional history taking

## 2023-05-26 NOTE — ED NOTES
Patient describes trouble with acid reflux x 3 days.  She takes medication that has always been effective for that issue.  This morning felt like something was not right, has pressure in center of chest, cheat pain, and a feeling of fullness in epigastric area.  Took Pepto bismol at home and felt like it helped some but problem persists.  Reports nausea, one episode of vomiting 2 days ago, Green diarrhea last weekend that lasted 4 days and then went away.  Last bowel movement was this morning and reports that it was normal for her and that she maintains a level of diarrhea.

## 2023-05-26 NOTE — ED PROVIDER NOTES
Subjective   History of Present Illness  Chief complaint: Chest pain    63-year-old female presents with chest pain and shortness of breath.  Patient states symptoms started this morning.  She states she has a heaviness throughout her chest.  She has had some diaphoresis and nausea as well.  She states her heart rate has been elevated.  She denies cough or congestion.  She has had no fever.  She denies any pain or swelling in her legs.    History provided by:  Patient      Review of Systems   Constitutional: Positive for diaphoresis. Negative for fever.   HENT: Negative for congestion.    Respiratory: Positive for shortness of breath. Negative for cough.    Cardiovascular: Positive for chest pain.   Gastrointestinal: Positive for nausea. Negative for abdominal pain and vomiting.   Musculoskeletal: Negative for back pain.   Neurological: Negative for headaches.       Past Medical History:   Diagnosis Date   • Anesthesia complication     pt states she needs extra d/t red hair   • Arthritis    • Depressive disorder    • Hypothyroid    • Migraine    • MRSA infection     on hands   • PONV (postoperative nausea and vomiting)        Allergies   Allergen Reactions   • Compazine [Prochlorperazine] Anxiety   • Ferumoxytol Anaphylaxis     Within 8 mins of infusion patient flushed including arms, diaphoretic, nauseous, chest and back pain.    • Lac Bovis Diarrhea   • Dairy Aid [Tilactase] Nausea Only   • Eggs Or Egg-Derived Products Nausea Only   • Iron Other (See Comments)     Flu like symptoms   • Prochlorperazine Edisylate Other (See Comments)     jitters   • Sulfa Antibiotics Other (See Comments)     Feels like I'm going to die.  Internal pain.   • Trazodone Other (See Comments)     Unable to sleep   • Ciprofloxacin Rash     CAUSES AGITATION   • Tomato Rash     Rash of face       Past Surgical History:   Procedure Laterality Date   • BACK SURGERY     • GALLBLADDER SURGERY     • HYSTERECTOMY     • MASS EXCISION Right  "5/11/2022    Procedure: LIPOMA EXCISION from right shoulder;  Surgeon: Benjamin Munguia MD;  Location: [facility] MAIN OR;  Service: General;  Laterality: Right;       Family History   Problem Relation Age of Onset   • Stroke Mother    • Mental illness Father    • Heart disease Father        Social History     Socioeconomic History   • Marital status:    • Number of children: 2   • Years of education: 14   Tobacco Use   • Smoking status: Never   • Smokeless tobacco: Never   Vaping Use   • Vaping Use: Never used   Substance and Sexual Activity   • Alcohol use: Not Currently   • Drug use: Not Currently   • Sexual activity: Defer       /80 (BP Location: Left arm, Patient Position: Lying)   Pulse 94   Temp 97.8 °F (36.6 °C) (Oral)   Resp 14   Ht 152.4 cm (60\")   Wt 80.7 kg (177 lb 14.6 oz)   SpO2 94%   BMI 34.75 kg/m²       Objective   Physical Exam  Vitals and nursing note reviewed.   Constitutional:       Appearance: She is well-developed.   HENT:      Head: Normocephalic and atraumatic.   Cardiovascular:      Rate and Rhythm: Regular rhythm. Tachycardia present.      Heart sounds: Normal heart sounds.   Pulmonary:      Effort: Pulmonary effort is normal. No respiratory distress.      Breath sounds: Normal breath sounds.   Abdominal:      General: Bowel sounds are normal.      Palpations: Abdomen is soft.      Tenderness: There is no abdominal tenderness.   Musculoskeletal:      Right lower leg: No tenderness. No edema.      Left lower leg: No tenderness. No edema.   Skin:     General: Skin is warm and dry.   Neurological:      Mental Status: She is alert and oriented to person, place, and time.         Procedures           ED Course      My interpretation of EKG shows sinus tachycardia, rate of 109, no ST elevation     Results for orders placed or performed during the hospital encounter of 05/26/23   Comprehensive Metabolic Panel    Specimen: Blood   Result Value Ref Range    Glucose 108 (H) 65 - 99 " mg/dL    BUN 9 8 - 23 mg/dL    Creatinine 0.83 0.57 - 1.00 mg/dL    Sodium 143 136 - 145 mmol/L    Potassium 4.1 3.5 - 5.2 mmol/L    Chloride 106 98 - 107 mmol/L    CO2 27.0 22.0 - 29.0 mmol/L    Calcium 9.6 8.6 - 10.5 mg/dL    Total Protein 6.9 6.0 - 8.5 g/dL    Albumin 4.4 3.5 - 5.2 g/dL    ALT (SGPT) 17 1 - 33 U/L    AST (SGOT) 17 1 - 32 U/L    Alkaline Phosphatase 111 39 - 117 U/L    Total Bilirubin 0.2 0.0 - 1.2 mg/dL    Globulin 2.5 gm/dL    A/G Ratio 1.8 g/dL    BUN/Creatinine Ratio 10.8 7.0 - 25.0    Anion Gap 10.0 5.0 - 15.0 mmol/L    eGFR 80.3 >60.0 mL/min/1.73   Protime-INR    Specimen: Blood   Result Value Ref Range    Protime 10.2 9.6 - 11.7 Seconds    INR 0.95 0.93 - 1.10   aPTT    Specimen: Blood   Result Value Ref Range    PTT 24.8 (L) 61.0 - 76.5 seconds   Single High Sensitivity Troponin T    Specimen: Blood   Result Value Ref Range    HS Troponin T <6 <10 ng/L   D-dimer, Quantitative    Specimen: Blood   Result Value Ref Range    D-Dimer, Quantitative 0.42 0.00 - 0.61 mg/L (FEU)   BNP    Specimen: Blood   Result Value Ref Range    proBNP <36.0 0.0 - 900.0 pg/mL   CBC Auto Differential    Specimen: Blood   Result Value Ref Range    WBC 7.00 3.40 - 10.80 10*3/mm3    RBC 3.85 3.77 - 5.28 10*6/mm3    Hemoglobin 10.9 (L) 12.0 - 15.9 g/dL    Hematocrit 33.3 (L) 34.0 - 46.6 %    MCV 86.4 79.0 - 97.0 fL    MCH 28.4 26.6 - 33.0 pg    MCHC 32.8 31.5 - 35.7 g/dL    RDW 15.8 (H) 12.3 - 15.4 %    RDW-SD 47.3 37.0 - 54.0 fl    MPV 8.4 6.0 - 12.0 fL    Platelets 358 140 - 450 10*3/mm3    Neutrophil % 67.4 42.7 - 76.0 %    Lymphocyte % 26.4 19.6 - 45.3 %    Monocyte % 4.6 (L) 5.0 - 12.0 %    Eosinophil % 1.1 0.3 - 6.2 %    Basophil % 0.5 0.0 - 1.5 %    Neutrophils, Absolute 4.70 1.70 - 7.00 10*3/mm3    Lymphocytes, Absolute 1.80 0.70 - 3.10 10*3/mm3    Monocytes, Absolute 0.30 0.10 - 0.90 10*3/mm3    Eosinophils, Absolute 0.10 0.00 - 0.40 10*3/mm3    Basophils, Absolute 0.00 0.00 - 0.20 10*3/mm3    nRBC 0.0 0.0 -  0.2 /100 WBC   ECG 12 Lead Chest Pain   Result Value Ref Range    QT Interval 351 ms     XR Chest 1 View    Result Date: 5/26/2023  Impression: Mild cardiomegaly Small esophageal hiatal hernia No acute chest findings. Electronically Signed: Sofiya Dorado  5/26/2023 1:43 PM EDT  Workstation ID: [id number]                                    Medical Decision Making  Chest pain, unspecified type: acute illness or injury  Amount and/or Complexity of Data Reviewed  Labs: ordered. Decision-making details documented in ED Course.  Radiology: ordered and independent interpretation performed. Decision-making details documented in ED Course.  ECG/medicine tests: ordered and independent interpretation performed. Decision-making details documented in ED Course.      Risk  OTC drugs.  Prescription drug management.  Decision regarding hospitalization.        Patient had the above evaluation.  Results were discussed with the patient.  My interpretation of chest x-ray shows no infiltrate or effusion.  EKG shows no acute ischemia.  Troponin is negative.  BNP is normal.  D-dimer is negative.  White blood cell count is normal.  CMP is unremarkable.  Patient has remained well-appearing in the emergency room.  She will be placed in observation for further cardiac monitoring, serial troponins, stress test in the morning.  Patient is agreeable with this plan.      Final diagnoses:   Chest pain, unspecified type       ED Disposition  ED Disposition     ED Disposition   Decision to Admit    Condition   --    Comment   --             No follow-up provider specified.       Medication List      No changes were made to your prescriptions during this visit.          Nakul Putnam MD  05/26/23 2430

## 2023-05-27 ENCOUNTER — APPOINTMENT (OUTPATIENT)
Dept: NUCLEAR MEDICINE | Facility: HOSPITAL | Age: 63
End: 2023-05-27
Payer: MEDICARE

## 2023-05-27 VITALS
WEIGHT: 176.4 LBS | HEART RATE: 87 BPM | RESPIRATION RATE: 18 BRPM | SYSTOLIC BLOOD PRESSURE: 108 MMHG | DIASTOLIC BLOOD PRESSURE: 66 MMHG | TEMPERATURE: 98.1 F | OXYGEN SATURATION: 95 % | BODY MASS INDEX: 34.63 KG/M2 | HEIGHT: 60 IN

## 2023-05-27 PROBLEM — R94.39 ABNORMAL NUCLEAR STRESS TEST: Status: ACTIVE | Noted: 2023-05-26

## 2023-05-27 LAB
ANION GAP SERPL CALCULATED.3IONS-SCNC: 11 MMOL/L (ref 5–15)
BASOPHILS # BLD AUTO: 0 10*3/MM3 (ref 0–0.2)
BASOPHILS NFR BLD AUTO: 0.8 % (ref 0–1.5)
BH CV NUCLEAR PRIOR STUDY: 3
BH CV REST NUCLEAR ISOTOPE DOSE: 11 MCI
BH CV STRESS BP STAGE 1: NORMAL
BH CV STRESS COMMENTS STAGE 1: NORMAL
BH CV STRESS DOSE REGADENOSON STAGE 1: 0.4
BH CV STRESS DURATION MIN STAGE 1: 0
BH CV STRESS DURATION SEC STAGE 1: 10
BH CV STRESS HR STAGE 1: 96
BH CV STRESS NUCLEAR ISOTOPE DOSE: 32.3 MCI
BH CV STRESS PROTOCOL 1: NORMAL
BH CV STRESS RECOVERY BP: NORMAL MMHG
BH CV STRESS RECOVERY HR: 90 BPM
BH CV STRESS STAGE 1: 1
BUN SERPL-MCNC: 12 MG/DL (ref 8–23)
BUN/CREAT SERPL: 16.4 (ref 7–25)
CALCIUM SPEC-SCNC: 9.1 MG/DL (ref 8.6–10.5)
CHLORIDE SERPL-SCNC: 107 MMOL/L (ref 98–107)
CHOLEST SERPL-MCNC: 234 MG/DL (ref 0–200)
CO2 SERPL-SCNC: 24 MMOL/L (ref 22–29)
CREAT SERPL-MCNC: 0.73 MG/DL (ref 0.57–1)
DEPRECATED RDW RBC AUTO: 49 FL (ref 37–54)
EGFRCR SERPLBLD CKD-EPI 2021: 93.7 ML/MIN/1.73
EOSINOPHIL # BLD AUTO: 0.1 10*3/MM3 (ref 0–0.4)
EOSINOPHIL NFR BLD AUTO: 1.3 % (ref 0.3–6.2)
ERYTHROCYTE [DISTWIDTH] IN BLOOD BY AUTOMATED COUNT: 15.8 % (ref 12.3–15.4)
GEN 5 2HR TROPONIN T REFLEX: <6 NG/L
GLUCOSE SERPL-MCNC: 116 MG/DL (ref 65–99)
HBA1C MFR BLD: 6 % (ref 4.8–5.6)
HCT VFR BLD AUTO: 30.9 % (ref 34–46.6)
HDLC SERPL-MCNC: 71 MG/DL (ref 40–60)
HGB BLD-MCNC: 10.5 G/DL (ref 12–15.9)
LDLC SERPL CALC-MCNC: 141 MG/DL (ref 0–100)
LDLC/HDLC SERPL: 1.95 {RATIO}
LV EF NUC BP: 72 %
LYMPHOCYTES # BLD AUTO: 2.5 10*3/MM3 (ref 0.7–3.1)
LYMPHOCYTES NFR BLD AUTO: 38.6 % (ref 19.6–45.3)
MAXIMAL PREDICTED HEART RATE: 159 BPM
MCH RBC QN AUTO: 28.8 PG (ref 26.6–33)
MCHC RBC AUTO-ENTMCNC: 34.1 G/DL (ref 31.5–35.7)
MCV RBC AUTO: 84.5 FL (ref 79–97)
MONOCYTES # BLD AUTO: 0.3 10*3/MM3 (ref 0.1–0.9)
MONOCYTES NFR BLD AUTO: 5.3 % (ref 5–12)
NEUTROPHILS NFR BLD AUTO: 3.5 10*3/MM3 (ref 1.7–7)
NEUTROPHILS NFR BLD AUTO: 54 % (ref 42.7–76)
NRBC BLD AUTO-RTO: 0 /100 WBC (ref 0–0.2)
PERCENT MAX PREDICTED HR: 61.64 %
PLATELET # BLD AUTO: 340 10*3/MM3 (ref 140–450)
PMV BLD AUTO: 8.6 FL (ref 6–12)
POTASSIUM SERPL-SCNC: 4.2 MMOL/L (ref 3.5–5.2)
QT INTERVAL: 351 MS
RBC # BLD AUTO: 3.65 10*6/MM3 (ref 3.77–5.28)
SODIUM SERPL-SCNC: 142 MMOL/L (ref 136–145)
STRESS BASELINE BP: NORMAL MMHG
STRESS BASELINE HR: 85 BPM
STRESS PERCENT HR: 73 %
STRESS POST PEAK BP: NORMAL MMHG
STRESS POST PEAK HR: 98 BPM
STRESS TARGET HR: 135 BPM
TRIGL SERPL-MCNC: 124 MG/DL (ref 0–150)
TROPONIN T DELTA: NORMAL
TROPONIN T SERPL HS-MCNC: <6 NG/L
VLDLC SERPL-MCNC: 22 MG/DL (ref 5–40)
WBC NRBC COR # BLD: 6.4 10*3/MM3 (ref 3.4–10.8)

## 2023-05-27 PROCEDURE — 83036 HEMOGLOBIN GLYCOSYLATED A1C: CPT | Performed by: NURSE PRACTITIONER

## 2023-05-27 PROCEDURE — 93016 CV STRESS TEST SUPVJ ONLY: CPT | Performed by: NURSE PRACTITIONER

## 2023-05-27 PROCEDURE — 25010000002 HEPARIN (PORCINE) PER 1000 UNITS: Performed by: INTERNAL MEDICINE

## 2023-05-27 PROCEDURE — G0378 HOSPITAL OBSERVATION PER HR: HCPCS

## 2023-05-27 PROCEDURE — 84484 ASSAY OF TROPONIN QUANT: CPT | Performed by: INTERNAL MEDICINE

## 2023-05-27 PROCEDURE — C1769 GUIDE WIRE: HCPCS | Performed by: INTERNAL MEDICINE

## 2023-05-27 PROCEDURE — 80061 LIPID PANEL: CPT | Performed by: INTERNAL MEDICINE

## 2023-05-27 PROCEDURE — 85025 COMPLETE CBC W/AUTO DIFF WBC: CPT | Performed by: NURSE PRACTITIONER

## 2023-05-27 PROCEDURE — 93018 CV STRESS TEST I&R ONLY: CPT | Performed by: INTERNAL MEDICINE

## 2023-05-27 PROCEDURE — A9270 NON-COVERED ITEM OR SERVICE: HCPCS | Performed by: INTERNAL MEDICINE

## 2023-05-27 PROCEDURE — C1894 INTRO/SHEATH, NON-LASER: HCPCS

## 2023-05-27 PROCEDURE — 63710000001 SUCRALFATE 1 G TABLET: Performed by: INTERNAL MEDICINE

## 2023-05-27 PROCEDURE — 25010000002 MIDAZOLAM PER 1 MG: Performed by: INTERNAL MEDICINE

## 2023-05-27 PROCEDURE — 25010000002 REGADENOSON 0.4 MG/5ML SOLUTION: Performed by: EMERGENCY MEDICINE

## 2023-05-27 PROCEDURE — 78452 HT MUSCLE IMAGE SPECT MULT: CPT | Performed by: INTERNAL MEDICINE

## 2023-05-27 PROCEDURE — 0 LIDOCAINE 1 % SOLUTION: Performed by: INTERNAL MEDICINE

## 2023-05-27 PROCEDURE — 25510000001 IOPAMIDOL PER 1 ML: Performed by: INTERNAL MEDICINE

## 2023-05-27 PROCEDURE — A9502 TC99M TETROFOSMIN: HCPCS | Performed by: EMERGENCY MEDICINE

## 2023-05-27 PROCEDURE — 99152 MOD SED SAME PHYS/QHP 5/>YRS: CPT | Performed by: INTERNAL MEDICINE

## 2023-05-27 PROCEDURE — 78452 HT MUSCLE IMAGE SPECT MULT: CPT

## 2023-05-27 PROCEDURE — 93017 CV STRESS TEST TRACING ONLY: CPT

## 2023-05-27 PROCEDURE — 80048 BASIC METABOLIC PNL TOTAL CA: CPT | Performed by: NURSE PRACTITIONER

## 2023-05-27 PROCEDURE — 93458 L HRT ARTERY/VENTRICLE ANGIO: CPT | Performed by: INTERNAL MEDICINE

## 2023-05-27 PROCEDURE — C1894 INTRO/SHEATH, NON-LASER: HCPCS | Performed by: INTERNAL MEDICINE

## 2023-05-27 PROCEDURE — 25010000002 FENTANYL CITRATE (PF) 100 MCG/2ML SOLUTION: Performed by: INTERNAL MEDICINE

## 2023-05-27 PROCEDURE — 0 TECHNETIUM TETROFOSMIN KIT: Performed by: EMERGENCY MEDICINE

## 2023-05-27 PROCEDURE — 84484 ASSAY OF TROPONIN QUANT: CPT | Performed by: NURSE PRACTITIONER

## 2023-05-27 RX ORDER — SODIUM CHLORIDE 9 MG/ML
40 INJECTION, SOLUTION INTRAVENOUS AS NEEDED
Status: DISCONTINUED | OUTPATIENT
Start: 2023-05-27 | End: 2023-05-27 | Stop reason: HOSPADM

## 2023-05-27 RX ORDER — ACETAMINOPHEN 325 MG/1
650 TABLET ORAL EVERY 4 HOURS PRN
Status: DISCONTINUED | OUTPATIENT
Start: 2023-05-27 | End: 2023-05-27 | Stop reason: SDUPTHER

## 2023-05-27 RX ORDER — DIPHENHYDRAMINE HCL 25 MG
25 CAPSULE ORAL EVERY 6 HOURS PRN
Status: DISCONTINUED | OUTPATIENT
Start: 2023-05-27 | End: 2023-05-27 | Stop reason: HOSPADM

## 2023-05-27 RX ORDER — ASPIRIN 81 MG/1
81 TABLET ORAL DAILY
Qty: 30 TABLET | Refills: 0 | Status: SHIPPED | OUTPATIENT
Start: 2023-05-27

## 2023-05-27 RX ORDER — PANTOPRAZOLE SODIUM 40 MG/1
40 TABLET, DELAYED RELEASE ORAL DAILY
Qty: 30 TABLET | Refills: 0 | Status: SHIPPED | OUTPATIENT
Start: 2023-05-27

## 2023-05-27 RX ORDER — ONDANSETRON 4 MG/1
4 TABLET, FILM COATED ORAL EVERY 6 HOURS PRN
Status: DISCONTINUED | OUTPATIENT
Start: 2023-05-27 | End: 2023-05-27 | Stop reason: SDUPTHER

## 2023-05-27 RX ORDER — HEPARIN SODIUM 1000 [USP'U]/ML
INJECTION, SOLUTION INTRAVENOUS; SUBCUTANEOUS
Status: DISCONTINUED | OUTPATIENT
Start: 2023-05-27 | End: 2023-05-27 | Stop reason: HOSPADM

## 2023-05-27 RX ORDER — SUCRALFATE 1 G/1
1 TABLET ORAL
Qty: 120 TABLET | Refills: 0 | Status: SHIPPED | OUTPATIENT
Start: 2023-05-27

## 2023-05-27 RX ORDER — ONDANSETRON 2 MG/ML
4 INJECTION INTRAMUSCULAR; INTRAVENOUS EVERY 6 HOURS PRN
Status: DISCONTINUED | OUTPATIENT
Start: 2023-05-27 | End: 2023-05-27 | Stop reason: SDUPTHER

## 2023-05-27 RX ORDER — SODIUM CHLORIDE 0.9 % (FLUSH) 0.9 %
3-10 SYRINGE (ML) INJECTION AS NEEDED
Status: DISCONTINUED | OUTPATIENT
Start: 2023-05-27 | End: 2023-05-27 | Stop reason: HOSPADM

## 2023-05-27 RX ORDER — NICARDIPINE HYDROCHLORIDE 2.5 MG/ML
INJECTION INTRAVENOUS
Status: DISCONTINUED | OUTPATIENT
Start: 2023-05-27 | End: 2023-05-27 | Stop reason: HOSPADM

## 2023-05-27 RX ORDER — NITROGLYCERIN 0.4 MG/1
0.4 TABLET SUBLINGUAL
Status: DISCONTINUED | OUTPATIENT
Start: 2023-05-27 | End: 2023-05-27 | Stop reason: HOSPADM

## 2023-05-27 RX ORDER — SODIUM CHLORIDE 0.9 % (FLUSH) 0.9 %
3 SYRINGE (ML) INJECTION EVERY 12 HOURS SCHEDULED
Status: DISCONTINUED | OUTPATIENT
Start: 2023-05-27 | End: 2023-05-27 | Stop reason: HOSPADM

## 2023-05-27 RX ORDER — NITROGLYCERIN 5 MG/ML
INJECTION, SOLUTION INTRAVENOUS
Status: DISCONTINUED | OUTPATIENT
Start: 2023-05-27 | End: 2023-05-27 | Stop reason: HOSPADM

## 2023-05-27 RX ORDER — MIDAZOLAM HYDROCHLORIDE 1 MG/ML
INJECTION INTRAMUSCULAR; INTRAVENOUS
Status: DISCONTINUED | OUTPATIENT
Start: 2023-05-27 | End: 2023-05-27 | Stop reason: HOSPADM

## 2023-05-27 RX ORDER — LIDOCAINE HYDROCHLORIDE 10 MG/ML
INJECTION, SOLUTION INFILTRATION; PERINEURAL
Status: DISCONTINUED | OUTPATIENT
Start: 2023-05-27 | End: 2023-05-27 | Stop reason: HOSPADM

## 2023-05-27 RX ORDER — ATORVASTATIN CALCIUM 40 MG/1
40 TABLET, FILM COATED ORAL DAILY
Qty: 90 TABLET | Refills: 0 | Status: SHIPPED | OUTPATIENT
Start: 2023-05-27

## 2023-05-27 RX ORDER — REGADENOSON 0.08 MG/ML
0.4 INJECTION, SOLUTION INTRAVENOUS
Status: COMPLETED | OUTPATIENT
Start: 2023-05-27 | End: 2023-05-27

## 2023-05-27 RX ORDER — FENTANYL CITRATE 50 UG/ML
INJECTION, SOLUTION INTRAMUSCULAR; INTRAVENOUS
Status: DISCONTINUED | OUTPATIENT
Start: 2023-05-27 | End: 2023-05-27 | Stop reason: HOSPADM

## 2023-05-27 RX ADMIN — TETROFOSMIN 1 DOSE: 1.38 INJECTION, POWDER, LYOPHILIZED, FOR SOLUTION INTRAVENOUS at 09:25

## 2023-05-27 RX ADMIN — GUAIFENESIN 600 MG: 600 TABLET, EXTENDED RELEASE ORAL at 11:10

## 2023-05-27 RX ADMIN — TETROFOSMIN 1 DOSE: 1.38 INJECTION, POWDER, LYOPHILIZED, FOR SOLUTION INTRAVENOUS at 07:21

## 2023-05-27 RX ADMIN — SUCRALFATE 1 G: 1 TABLET ORAL at 07:22

## 2023-05-27 RX ADMIN — ACETAMINOPHEN 650 MG: 325 TABLET, FILM COATED ORAL at 02:58

## 2023-05-27 RX ADMIN — DULOXETINE HYDROCHLORIDE 90 MG: 30 CAPSULE, DELAYED RELEASE ORAL at 07:22

## 2023-05-27 RX ADMIN — SUCRALFATE 1 G: 1 TABLET ORAL at 17:34

## 2023-05-27 RX ADMIN — PANTOPRAZOLE SODIUM 40 MG: 40 TABLET, DELAYED RELEASE ORAL at 05:24

## 2023-05-27 RX ADMIN — REGADENOSON 0.4 MG: 0.08 INJECTION, SOLUTION INTRAVENOUS at 09:25

## 2023-05-27 RX ADMIN — CETIRIZINE HYDROCHLORIDE 10 MG: 10 TABLET, FILM COATED ORAL at 11:10

## 2023-05-27 RX ADMIN — SUCRALFATE 1 G: 1 TABLET ORAL at 11:10

## 2023-05-27 RX ADMIN — Medication 10 ML: at 11:10

## 2023-05-27 NOTE — PLAN OF CARE
Problem: Adult Inpatient Plan of Care  Goal: Plan of Care Review  Outcome: Ongoing, Progressing  Flowsheets (Taken 5/27/2023 0219)  Progress: no change  Outcome Evaluation: sched for  myoview in am. up adlib. unable to get stool speciamen at this time  Goal: Patient-Specific Goal (Individualized)  Outcome: Ongoing, Progressing  Goal: Absence of Hospital-Acquired Illness or Injury  Outcome: Ongoing, Progressing  Intervention: Identify and Manage Fall Risk  Recent Flowsheet Documentation  Taken 5/27/2023 0000 by Jamila Almaguer RN  Safety Promotion/Fall Prevention:   safety round/check completed   nonskid shoes/slippers when out of bed   lighting adjusted  Taken 5/26/2023 2000 by Jamila Almaguer RN  Safety Promotion/Fall Prevention:   safety round/check completed   nonskid shoes/slippers when out of bed   lighting adjusted  Intervention: Prevent Skin Injury  Recent Flowsheet Documentation  Taken 5/27/2023 0000 by Jamila Almaguer RN  Body Position: position changed independently  Taken 5/26/2023 2000 by Jamila Almaguer RN  Body Position: position changed independently  Intervention: Prevent and Manage VTE (Venous Thromboembolism) Risk  Recent Flowsheet Documentation  Taken 5/27/2023 0000 by Jamila Almaguer RN  Activity Management: up ad meri  Taken 5/26/2023 2000 by Jamila Almaguer RN  Activity Management:   up ad meri   ambulated to bathroom  Intervention: Prevent Infection  Recent Flowsheet Documentation  Taken 5/27/2023 0000 by Jamila Almaguer RN  Infection Prevention:   rest/sleep promoted   single patient room provided  Goal: Optimal Comfort and Wellbeing  Outcome: Ongoing, Progressing  Goal: Readiness for Transition of Care  Outcome: Ongoing, Progressing     Problem: Obstructive Sleep Apnea Risk or Actual Comorbidity Management  Goal: Unobstructed Breathing During Sleep  Outcome: Ongoing, Progressing     Problem: Chest Pain  Goal: Resolution of Chest Pain Symptoms  Outcome: Ongoing,  Progressing   Goal Outcome Evaluation:           Progress: no change  Outcome Evaluation: sched for  myoview in am. up adlib. unable to get stool speciamen at this time

## 2023-05-27 NOTE — CONSULTS
Referring Provider: Nakul Putnam MD    Reason for Consultation: Chest pain, abnormal nuclear stress test      Patient Care Team:  Rosemarie Peng APRN as PCP - General (Nurse Practitioner)  Benjamin Munguia MD as Consulting Physician (General Surgery)      SUBJECTIVE     Chief Complaint: Chest pain and shortness of breath    History of present illness:  Gina Wright is a 63 y.o. female who presented to the hospital with chest pain and shortness of breath.        Review of systems:    Constitutional: No weakness, fatigue, fever, rigors, chills   Eyes: No vision changes, eye pain   ENT/oropharynx: No difficulty swallowing, sore throat, epistaxis, changes in hearing   Cardiovascular: No chest pain, chest tightness, palpitations, paroxysmal nocturnal dyspnea, orthopnea, diaphoresis, dizziness / syncopal episode   Respiratory: No shortness of breath, dyspnea on exertion, cough, wheezing, hemoptysis   Gastrointestinal: No abdominal pain, nausea, vomiting, diarrhea, bloody stools   Genitourinary: No hematuria, dysuria   Neurological: No headache, tremors, numbness, one-sided weakness    Musculoskeletal: No cramps, myalgias, joint pain, joint swelling   Integument: No rash, edema        Personal History:      Past Medical History:   Diagnosis Date   • Anesthesia complication     pt states she needs extra d/t red hair   • Arthritis    • Depressive disorder    • Hypothyroid    • Migraine    • MRSA infection     on hands   • PONV (postoperative nausea and vomiting)        Past Surgical History:   Procedure Laterality Date   • BACK SURGERY     • GALLBLADDER SURGERY     • HYSTERECTOMY     • MASS EXCISION Right 5/11/2022    Procedure: LIPOMA EXCISION from right shoulder;  Surgeon: Benjamin Munguia MD;  Location: [facility] MAIN OR;  Service: General;  Laterality: Right;       Family History   Problem Relation Age of Onset   • Stroke Mother    • Mental illness Father    • Heart disease Father        Social History     Tobacco  "Use   • Smoking status: Never   • Smokeless tobacco: Never   Vaping Use   • Vaping Use: Never used   Substance Use Topics   • Alcohol use: Not Currently   • Drug use: Not Currently        Medications Prior to Admission   Medication Sig Dispense Refill Last Dose   • DULoxetine (CYMBALTA) 30 MG capsule Take 3 capsules by mouth Every Morning.   5/26/2023   • ezetimibe (ZETIA) 10 MG tablet Take 1 tablet by mouth Daily.   5/26/2023   • omeprazole (priLOSEC) 40 MG capsule Take 1 capsule by mouth Daily.   5/26/2023        Allergies:     Compazine [prochlorperazine], Ferumoxytol, Lac bovis, Dairy aid [tilactase], Eggs or egg-derived products, Iron, Prochlorperazine edisylate, Sulfa antibiotics, Trazodone, Ciprofloxacin, and Tomato    Scheduled Meds:cetirizine, 10 mg, Oral, Daily  DULoxetine, 90 mg, Oral, QAM  guaiFENesin, 600 mg, Oral, Q12H  melatonin, 10 mg, Oral, Nightly  pantoprazole, 40 mg, Oral, Q AM  senna-docusate sodium, 2 tablet, Oral, BID  sodium chloride, 10 mL, Intravenous, Q12H  sucralfate, 1 g, Oral, 4x Daily AC & at Bedtime      Continuous Infusions:   PRN Meds:•  acetaminophen  •  senna-docusate sodium **AND** polyethylene glycol **AND** bisacodyl **AND** bisacodyl  •  ondansetron **OR** ondansetron  •  [COMPLETED] Insert Peripheral IV **AND** sodium chloride  •  sodium chloride  •  sodium chloride  •  zolpidem      OBJECTIVE    Vital Signs  Vitals:    05/26/23 2306 05/27/23 0320 05/27/23 0601 05/27/23 1056   BP: 113/75 115/75 118/78 126/72   BP Location: Left arm Left arm Right arm Left arm   Patient Position: Lying Lying Lying Lying   Pulse: 90 84 80 93   Resp: 16   18   Temp: 98.5 °F (36.9 °C) 98.4 °F (36.9 °C) 98.5 °F (36.9 °C) 98.4 °F (36.9 °C)   TempSrc: Oral Oral Oral Oral   SpO2: 97% 97% 95% 100%   Weight:       Height:           Flowsheet Rows    Flowsheet Row First Filed Value   Admission Height 152.4 cm (60\") Documented at 05/26/2023 1234   Admission Weight 80.7 kg (177 lb 14.6 oz) Documented at " 05/26/2023 1234            Intake/Output Summary (Last 24 hours) at 5/27/2023 1132  Last data filed at 5/27/2023 0953  Gross per 24 hour   Intake 444 ml   Output 850 ml   Net -406 ml        Telemetry: Sinus tachycardia with nonspecific T wave abnormalities in the lateral leads.    Physical Exam:  The patient is alert, oriented and in no distress.  Vital signs as noted above.  Head and neck revealed no carotid bruits or jugular venous distention.  No thyromegaly or lymph adenopathy is present  Lungs clear.  No wheezing.  Breath sounds are normal bilaterally.  Heart normal first and second heart sounds. No murmur.  No precordial rub is present.  No gallop is present.  Abdomen soft and nontender.  No organomegaly is present.  Extremities with good peripheral pulses without any pedal edema.  Skin warm and dry.  Musculoskeletal system is grossly normal.  CNS grossly normal.       Results Review:  I have personally reviewed the results from the time of this admission to 5/27/2023 11:32 EDT and agree with these findings:  []  Laboratory  []  Microbiology  []  Radiology  []  EKG/Telemetry   []  Cardiology/Vascular   []  Pathology  []  Old records  []  Other:    Most notable findings include:     Lab Results (last 24 hours)     Procedure Component Value Units Date/Time    Basic Metabolic Panel [[id number]]  (Abnormal) Collected: 05/27/23 0311    Specimen: Blood from Arm, Right Updated: 05/27/23 0416     Glucose 116 mg/dL      BUN 12 mg/dL      Creatinine 0.73 mg/dL      Sodium 142 mmol/L      Potassium 4.2 mmol/L      Chloride 107 mmol/L      CO2 24.0 mmol/L      Calcium 9.1 mg/dL      BUN/Creatinine Ratio 16.4     Anion Gap 11.0 mmol/L      eGFR 93.7 mL/min/1.73     Narrative:      GFR Normal >60  Chronic Kidney Disease <60  Kidney Failure <15      High Sensitivity Troponin T [[id number]]  (Normal) Collected: 05/27/23 0311    Specimen: Blood from Arm, Right Updated: 05/27/23 0416     HS Troponin T <6 ng/L     Narrative:       High Sensitive Troponin T Reference Range:  <10.0 ng/L- Negative Female for AMI  <15.0 ng/L- Negative Male for AMI  >=10 - Abnormal Female indicating possible myocardial injury.  >=15 - Abnormal Male indicating possible myocardial injury.   Clinicians would have to utilize clinical acumen, EKG, Troponin, and serial changes to determine if it is an Acute Myocardial Infarction or myocardial injury due to an underlying chronic condition.         CBC & Differential [[id number]]  (Abnormal) Collected: 05/27/23 0311    Specimen: Blood from Arm, Right Updated: 05/27/23 0346    Narrative:      The following orders were created for panel order CBC & Differential.  Procedure                               Abnormality         Status                     ---------                               -----------         ------                     CBC Auto Differential[[id number]]        Abnormal            Final result                 Please view results for these tests on the individual orders.    CBC Auto Differential [[id number]]  (Abnormal) Collected: 05/27/23 0311    Specimen: Blood from Arm, Right Updated: 05/27/23 0346     WBC 6.40 10*3/mm3      RBC 3.65 10*6/mm3      Hemoglobin 10.5 g/dL      Hematocrit 30.9 %      MCV 84.5 fL      MCH 28.8 pg      MCHC 34.1 g/dL      RDW 15.8 %      RDW-SD 49.0 fl      MPV 8.6 fL      Platelets 340 10*3/mm3      Neutrophil % 54.0 %      Lymphocyte % 38.6 %      Monocyte % 5.3 %      Eosinophil % 1.3 %      Basophil % 0.8 %      Neutrophils, Absolute 3.50 10*3/mm3      Lymphocytes, Absolute 2.50 10*3/mm3      Monocytes, Absolute 0.30 10*3/mm3      Eosinophils, Absolute 0.10 10*3/mm3      Basophils, Absolute 0.00 10*3/mm3      nRBC 0.0 /100 WBC     Respiratory Panel PCR w/COVID-19(SARS-CoV-2) JEFFERY/LARRY/CRISTHIAN/PAD/COR/MAD/JONI In-House, NP Swab in [facility], 3-4 HR TAT - Swab, Nasopharynx [[id number]]  (Normal) Collected: 05/26/23 1714    Specimen: Swab from Nasopharynx Updated: 05/26/23 1816     ADENOVIRUS,  PCR Not Detected     Coronavirus 229E Not Detected     Coronavirus HKU1 Not Detected     Coronavirus NL63 Not Detected     Coronavirus OC43 Not Detected     COVID19 Not Detected     Human Metapneumovirus Not Detected     Human Rhinovirus/Enterovirus Not Detected     Influenza A PCR Not Detected     Influenza B PCR Not Detected     Parainfluenza Virus 1 Not Detected     Parainfluenza Virus 2 Not Detected     Parainfluenza Virus 3 Not Detected     Parainfluenza Virus 4 Not Detected     RSV, PCR Not Detected     Bordetella pertussis pcr Not Detected     Bordetella parapertussis PCR Not Detected     Chlamydophila pneumoniae PCR Not Detected     Mycoplasma pneumo by PCR Not Detected    Narrative:      In the setting of a positive respiratory panel with a viral infection PLUS a negative procalcitonin without other underlying concern for bacterial infection, consider observing off antibiotics or discontinuation of antibiotics and continue supportive care. If the respiratory panel is positive for atypical bacterial infection (Bordetella pertussis, Chlamydophila pneumoniae, or Mycoplasma pneumoniae), consider antibiotic de-escalation to target atypical bacterial infection.    TSH [[id number]]  (Normal) Collected: 05/26/23 1357    Specimen: Blood Updated: 05/26/23 1717     TSH 3.830 uIU/mL     T4, Free [[id number]]  (Abnormal) Collected: 05/26/23 1357    Specimen: Blood Updated: 05/26/23 1717     Free T4 0.88 ng/dL     Narrative:      Results may be falsely increased if patient taking Biotin.      BNP [[id number]]  (Normal) Collected: 05/26/23 1357    Specimen: Blood Updated: 05/26/23 1425     proBNP <36.0 pg/mL     Narrative:      Among patients with dyspnea, NT-proBNP is highly sensitive for the detection of acute congestive heart failure. In addition NT-proBNP of <300 pg/ml effectively rules out acute congestive heart failure with 99% negative predictive value.    Results may be falsely decreased if patient taking Biotin.       Single High Sensitivity Troponin T [[id number]]  (Normal) Collected: 05/26/23 1357    Specimen: Blood Updated: 05/26/23 1425     HS Troponin T <6 ng/L     Narrative:      High Sensitive Troponin T Reference Range:  <10.0 ng/L- Negative Female for AMI  <15.0 ng/L- Negative Male for AMI  >=10 - Abnormal Female indicating possible myocardial injury.  >=15 - Abnormal Male indicating possible myocardial injury.   Clinicians would have to utilize clinical acumen, EKG, Troponin, and serial changes to determine if it is an Acute Myocardial Infarction or myocardial injury due to an underlying chronic condition.         Comprehensive Metabolic Panel [[id number]]  (Abnormal) Collected: 05/26/23 1357    Specimen: Blood Updated: 05/26/23 1421     Glucose 108 mg/dL      BUN 9 mg/dL      Creatinine 0.83 mg/dL      Sodium 143 mmol/L      Potassium 4.1 mmol/L      Chloride 106 mmol/L      CO2 27.0 mmol/L      Calcium 9.6 mg/dL      Total Protein 6.9 g/dL      Albumin 4.4 g/dL      ALT (SGPT) 17 U/L      AST (SGOT) 17 U/L      Alkaline Phosphatase 111 U/L      Total Bilirubin 0.2 mg/dL      Globulin 2.5 gm/dL      A/G Ratio 1.8 g/dL      BUN/Creatinine Ratio 10.8     Anion Gap 10.0 mmol/L      eGFR 80.3 mL/min/1.73     Narrative:      GFR Normal >60  Chronic Kidney Disease <60  Kidney Failure <15      aPTT [[id number]]  (Abnormal) Collected: 05/26/23 1357    Specimen: Blood Updated: 05/26/23 1418     PTT 24.8 seconds     Protime-INR [[id number]]  (Normal) Collected: 05/26/23 1357    Specimen: Blood Updated: 05/26/23 1418     Protime 10.2 Seconds      INR 0.95    D-dimer, Quantitative [[id number]]  (Normal) Collected: 05/26/23 1357    Specimen: Blood Updated: 05/26/23 1418     D-Dimer, Quantitative 0.42 mg/L (FEU)     Narrative:      According to the assay 's published package insert, a normal (<0.50 mg/L (FEU)) D-dimer result in conjunction with a non-high clinical probability assessment, excludes deep vein thrombosis (DVT)  "and pulmonary embolism (PE) with high sensitivity.    D-dimer values increase with age and this can make VTE exclusion of an older population difficult. To address this, the [employer], based on best available evidence and recent guidelines, recommends that clinicians use age-adjusted D-dimer thresholds in patients greater than 50 years of age with: a) a low probability of PE who do not meet all Pulmonary Embolism Rule Out Criteria, or b) in those with intermediate probability of PE.   The formula for an age-adjusted D-dimer cut-off is \"age/100\".  For example, a 60 year old patient would have an age-adjusted cut-off of 0.60 mg/L (FEU) and an 80 year old 0.80 mg/L (FEU).    CBC & Differential [[id number]]  (Abnormal) Collected: 05/26/23 1357    Specimen: Blood Updated: 05/26/23 1417    Narrative:      The following orders were created for panel order CBC & Differential.  Procedure                               Abnormality         Status                     ---------                               -----------         ------                     CBC Auto Differential[[id number]]        Abnormal            Final result                 Please view results for these tests on the individual orders.    CBC Auto Differential [[id number]]  (Abnormal) Collected: 05/26/23 1357    Specimen: Blood Updated: 05/26/23 1417     WBC 7.00 10*3/mm3      RBC 3.85 10*6/mm3      Hemoglobin 10.9 g/dL      Hematocrit 33.3 %      MCV 86.4 fL      MCH 28.4 pg      MCHC 32.8 g/dL      RDW 15.8 %      RDW-SD 47.3 fl      MPV 8.4 fL      Platelets 358 10*3/mm3      Neutrophil % 67.4 %      Lymphocyte % 26.4 %      Monocyte % 4.6 %      Eosinophil % 1.1 %      Basophil % 0.5 %      Neutrophils, Absolute 4.70 10*3/mm3      Lymphocytes, Absolute 1.80 10*3/mm3      Monocytes, Absolute 0.30 10*3/mm3      Eosinophils, Absolute 0.10 10*3/mm3      Basophils, Absolute 0.00 10*3/mm3      nRBC 0.0 /100 WBC           Imaging Results (Last 24 " Hours)     Procedure Component Value Units Date/Time    XR Chest 1 View [[id number]] Collected: 05/26/23 1343     Updated: 05/26/23 1346    Narrative:      XR CHEST 1 VW    Date of Exam: 5/26/2023 1:38 PM EDT    Indication: chest pain    Comparison: AP portable chest 8/19/2014.    Findings:  Mild cardiomegaly. Small esophageal hiatal hernia. Clear lungs. No pleural effusion, pneumothorax or acute osseous abnormality.      Impression:      Impression:  Mild cardiomegaly  Small esophageal hiatal hernia  No acute chest findings.      Electronically Signed: Sofiya Dorado    5/26/2023 1:43 PM EDT    Workstation ID: [id number]          LAB RESULTS (LAST 7 DAYS)    CBC  Results from last 7 days   Lab Units 05/27/23  0311 05/26/23  1357   WBC 10*3/mm3 6.40 7.00   RBC 10*6/mm3 3.65* 3.85   HEMOGLOBIN g/dL 10.5* 10.9*   HEMATOCRIT % 30.9* 33.3*   MCV fL 84.5 86.4   PLATELETS 10*3/mm3 340 358       BMP  Results from last 7 days   Lab Units 05/27/23  0311 05/26/23  1357   SODIUM mmol/L 142 143   POTASSIUM mmol/L 4.2 4.1   CHLORIDE mmol/L 107 106   CO2 mmol/L 24.0 27.0   BUN mg/dL 12 9   CREATININE mg/dL 0.73 0.83   GLUCOSE mg/dL 116* 108*       CMP   Results from last 7 days   Lab Units 05/27/23  0311 05/26/23  1357   SODIUM mmol/L 142 143   POTASSIUM mmol/L 4.2 4.1   CHLORIDE mmol/L 107 106   CO2 mmol/L 24.0 27.0   BUN mg/dL 12 9   CREATININE mg/dL 0.73 0.83   GLUCOSE mg/dL 116* 108*   ALBUMIN g/dL  --  4.4   BILIRUBIN mg/dL  --  0.2   ALK PHOS U/L  --  111   AST (SGOT) U/L  --  17   ALT (SGPT) U/L  --  17       BNP        TROPONIN  Results from last 7 days   Lab Units 05/27/23  0311   HSTROP T ng/L <6       CoAg  Results from last 7 days   Lab Units 05/26/23  1357   INR  0.95   APTT seconds 24.8*       Creatinine Clearance  Estimated Creatinine Clearance: 75.8 mL/min (by C-G formula based on SCr of 0.73 mg/dL).    ABG          Radiology  XR Chest 1 View    Result Date: 5/26/2023  Impression: Mild cardiomegaly Small esophageal  hiatal hernia No acute chest findings. Electronically Signed: Sofiya Dorado  5/26/2023 1:43 PM EDT  Workstation ID: [id number]        EKG  I personally viewed and interpreted the patient's EKG/Telemetry data:  ECG 12 Lead Chest Pain   Final Result   HEART RATE= 109  bpm   RR Interval= 548  ms   WI Interval= 155  ms   P Horizontal Axis= 4  deg   P Front Axis= 33  deg   QRSD Interval= 95  ms   QT Interval= 351  ms   QRS Axis= -3  deg   T Wave Axis= 121  deg   - ABNORMAL ECG -   Sinus tachycardia   Nonspecific T abnormalities, lateral leads   When compared with ECG of 09-May-2022 14:26:15,   Significant axis, voltage or hypertrophy change   Electronically Signed By: Nakul Putnam ([place]) 27-May-2023 09:09:08   Date and Time of Study: 2023-05-26 12:39:32            Echocardiogram:          Stress Test:  Results for orders placed during the hospital encounter of 05/26/23    Stress Test With Myocardial Perfusion One Day    Interpretation Summary  •  Findings consistent with a normal ECG stress test.  •  Left ventricular ejection fraction is hyperdynamic (Calculated EF > 70%).  •  Myocardial perfusion imaging indicates a small-sized, moderately severe area of ischemia located in the lateral wall.  •  Impressions are consistent with an intermediate risk study.        Cardiac Catheterization:  No results found for this or any previous visit.        Other:      ASSESSMENT & PLAN:    Principal Problem:    Chest pain    Chest pain/shortness of breath  63 years old woman presents with chest pain concerning for unstable angina.  ECG shows nonspecific ST-T wave changes in the lateral leads.  Troponin is negative.  Nuclear stress test shows small sized moderately severe ischemia located in the lateral wall.  I have discussed possible management options including aggressive medical management versus definitive coronary angiography.  After an excessive discussion we have decided to start her on medical treatment as well as obtain a  definitive coronary angiography for risk stratification.  She will be started on aspirin,.  We will consider starting statin based on results of lipid panel and cardiac catheterization.    Hypertension  Her blood pressure is currently well controlled    Hyperlipidemia  Obtain a lipid panel  Start high intensity statin    Prediabetes  A1c is 6  Diet, exercise, weight loss discussed with the patient  Also discussed the possibility of starting metformin.    Obesity  BMI is 35  Screening and treatment for sleep apnea  Diet, exercise, weight loss and lifestyle modifications discussed with the patient in details.      Addendum  Cardiac catheterization completed via right radial access.  Nonobstructive coronary disease.  Normal LVEDP  False positive nuclear stress test due to left dominant coronary system and body habitus.  Consider noncardiac etiology for chest pain.  Obtain a lipid panel to decide if she needs a statin.  Goal LDL less than 100      Jack Christensen MD  05/27/23  11:32 EDT

## 2023-05-27 NOTE — DISCHARGE SUMMARY
[facility]    Rosemarie Peng CHE LANDEROS    CHIEF COMPLAINT:     Chest Pain     HISTORY OF PRESENT ILLNESS:    HPI    63-year-old female presents with chest pain and shortness of breath.  Patient states symptoms started this morning.  She states she has a heaviness throughout her chest.  She has had some diaphoresis and nausea as well.  She states her heart rate has been elevated.  She denies cough or congestion.  She has had no fever.  She denies any pain or swelling in her legs.    Past Medical History:   Diagnosis Date   • Anesthesia complication     pt states she needs extra d/t red hair   • Arthritis    • Depressive disorder    • Hypothyroid    • Migraine    • MRSA infection     on hands   • PONV (postoperative nausea and vomiting)      Past Surgical History:   Procedure Laterality Date   • BACK SURGERY     • GALLBLADDER SURGERY     • HYSTERECTOMY     • MASS EXCISION Right 5/11/2022    Procedure: LIPOMA EXCISION from right shoulder;  Surgeon: Benjamin Munguia MD;  Location: [facility];  Service: General;  Laterality: Right;     Family History   Problem Relation Age of Onset   • Stroke Mother    • Mental illness Father    • Heart disease Father      Social History     Tobacco Use   • Smoking status: Never   • Smokeless tobacco: Never   Vaping Use   • Vaping Use: Never used   Substance Use Topics   • Alcohol use: Not Currently   • Drug use: Not Currently     Medications Prior to Admission   Medication Sig Dispense Refill Last Dose   • DULoxetine (CYMBALTA) 30 MG capsule Take 3 capsules by mouth Every Morning.   5/26/2023   • ezetimibe (ZETIA) 10 MG tablet Take 1 tablet by mouth Daily.   5/26/2023   • omeprazole (priLOSEC) 40 MG capsule Take 1 capsule by mouth Daily.   5/26/2023     Allergies:  Compazine [prochlorperazine], Ferumoxytol, Lac bovis, Dairy aid [tilactase], Eggs or egg-derived products, Iron, Prochlorperazine edisylate, Sulfa antibiotics, Trazodone, Ciprofloxacin, and  Tomato    Immunization History   Administered Date(s) Administered   • Influenza, Unspecified 09/01/2022           REVIEW OF SYSTEMS:    Review of Systems   Constitutional: Positive for malaise/fatigue.   HENT: Negative.    Eyes: Negative.    Cardiovascular: Positive for chest pain.   Respiratory: Negative.    Hematologic/Lymphatic: Negative.    Skin: Negative.    Musculoskeletal: Positive for joint pain.   Gastrointestinal: Positive for heartburn and nausea.   Genitourinary: Positive for incomplete emptying.   Neurological: Negative.    Psychiatric/Behavioral: Negative.    Allergic/Immunologic: Negative.        Vital Signs  Temp:  [98.1 °F (36.7 °C)-99.7 °F (37.6 °C)] 98.1 °F (36.7 °C)  Heart Rate:  [] 87  Resp:  [16-18] 18  BP: (105-128)/(63-86) 108/66          Physical Exam:  Physical Exam  Vitals and nursing note reviewed.   Constitutional:       Appearance: Normal appearance.   HENT:      Head: Normocephalic and atraumatic.      Right Ear: External ear normal.      Left Ear: External ear normal.      Nose: Nose normal.      Mouth/Throat:      Mouth: Mucous membranes are moist.      Pharynx: Oropharynx is clear.   Eyes:      Extraocular Movements: Extraocular movements intact.      Conjunctiva/sclera: Conjunctivae normal.      Pupils: Pupils are equal, round, and reactive to light.   Cardiovascular:      Rate and Rhythm: Normal rate and regular rhythm.      Pulses: Normal pulses.      Heart sounds: Normal heart sounds.   Pulmonary:      Effort: Pulmonary effort is normal.      Breath sounds: Normal breath sounds.   Abdominal:      General: Bowel sounds are normal.      Palpations: Abdomen is soft.   Musculoskeletal:         General: Tenderness present.      Cervical back: Normal range of motion.   Skin:     General: Skin is warm.      Capillary Refill: Capillary refill takes less than 2 seconds.   Neurological:      Mental Status: She is alert and oriented to person, place, and time.   Psychiatric:          Mood and Affect: Mood normal.         Behavior: Behavior normal.         Thought Content: Thought content normal.         Judgment: Judgment normal.         Emotional Behavior:    WNL   Debilities:   none  Results Review:    I reviewed the patient's new clinical results.  Lab Results (most recent)     Procedure Component Value Units Date/Time    Hemoglobin A1c [[id number]]  (Abnormal) Collected: 05/27/23 0311    Specimen: Blood from Arm, Right Updated: 05/27/23 1301     Hemoglobin A1C 6.00 %     Basic Metabolic Panel [[id number]]  (Abnormal) Collected: 05/27/23 0311    Specimen: Blood from Arm, Right Updated: 05/27/23 0416     Glucose 116 mg/dL      BUN 12 mg/dL      Creatinine 0.73 mg/dL      Sodium 142 mmol/L      Potassium 4.2 mmol/L      Chloride 107 mmol/L      CO2 24.0 mmol/L      Calcium 9.1 mg/dL      BUN/Creatinine Ratio 16.4     Anion Gap 11.0 mmol/L      eGFR 93.7 mL/min/1.73     Narrative:      GFR Normal >60  Chronic Kidney Disease <60  Kidney Failure <15      High Sensitivity Troponin T [[id number]]  (Normal) Collected: 05/27/23 0311    Specimen: Blood from Arm, Right Updated: 05/27/23 0416     HS Troponin T <6 ng/L     Narrative:      High Sensitive Troponin T Reference Range:  <10.0 ng/L- Negative Female for AMI  <15.0 ng/L- Negative Male for AMI  >=10 - Abnormal Female indicating possible myocardial injury.  >=15 - Abnormal Male indicating possible myocardial injury.   Clinicians would have to utilize clinical acumen, EKG, Troponin, and serial changes to determine if it is an Acute Myocardial Infarction or myocardial injury due to an underlying chronic condition.         CBC & Differential [[id number]]  (Abnormal) Collected: 05/27/23 0311    Specimen: Blood from Arm, Right Updated: 05/27/23 0346    Narrative:      The following orders were created for panel order CBC & Differential.  Procedure                               Abnormality         Status                     ---------                                -----------         ------                     CBC Auto Differential[[id number]]        Abnormal            Final result                 Please view results for these tests on the individual orders.    CBC Auto Differential [[id number]]  (Abnormal) Collected: 05/27/23 0311    Specimen: Blood from Arm, Right Updated: 05/27/23 0346     WBC 6.40 10*3/mm3      RBC 3.65 10*6/mm3      Hemoglobin 10.5 g/dL      Hematocrit 30.9 %      MCV 84.5 fL      MCH 28.8 pg      MCHC 34.1 g/dL      RDW 15.8 %      RDW-SD 49.0 fl      MPV 8.6 fL      Platelets 340 10*3/mm3      Neutrophil % 54.0 %      Lymphocyte % 38.6 %      Monocyte % 5.3 %      Eosinophil % 1.3 %      Basophil % 0.8 %      Neutrophils, Absolute 3.50 10*3/mm3      Lymphocytes, Absolute 2.50 10*3/mm3      Monocytes, Absolute 0.30 10*3/mm3      Eosinophils, Absolute 0.10 10*3/mm3      Basophils, Absolute 0.00 10*3/mm3      nRBC 0.0 /100 WBC     Respiratory Panel PCR w/COVID-19(SARS-CoV-2) JEFFERY/LARRY/CRISTHIAN/PAD/COR/MAD/JONI In-House, NP Swab in UTM/VTM, 3-4 HR TAT - Swab, Nasopharynx [[id number]]  (Normal) Collected: 05/26/23 1714    Specimen: Swab from Nasopharynx Updated: 05/26/23 1816     ADENOVIRUS, PCR Not Detected     Coronavirus 229E Not Detected     Coronavirus HKU1 Not Detected     Coronavirus NL63 Not Detected     Coronavirus OC43 Not Detected     COVID19 Not Detected     Human Metapneumovirus Not Detected     Human Rhinovirus/Enterovirus Not Detected     Influenza A PCR Not Detected     Influenza B PCR Not Detected     Parainfluenza Virus 1 Not Detected     Parainfluenza Virus 2 Not Detected     Parainfluenza Virus 3 Not Detected     Parainfluenza Virus 4 Not Detected     RSV, PCR Not Detected     Bordetella pertussis pcr Not Detected     Bordetella parapertussis PCR Not Detected     Chlamydophila pneumoniae PCR Not Detected     Mycoplasma pneumo by PCR Not Detected    Narrative:      In the setting of a positive respiratory panel with a viral infection PLUS a  negative procalcitonin without other underlying concern for bacterial infection, consider observing off antibiotics or discontinuation of antibiotics and continue supportive care. If the respiratory panel is positive for atypical bacterial infection (Bordetella pertussis, Chlamydophila pneumoniae, or Mycoplasma pneumoniae), consider antibiotic de-escalation to target atypical bacterial infection.    TSH [[id number]]  (Normal) Collected: 05/26/23 1357    Specimen: Blood Updated: 05/26/23 1717     TSH 3.830 uIU/mL     T4, Free [[id number]]  (Abnormal) Collected: 05/26/23 1357    Specimen: Blood Updated: 05/26/23 1717     Free T4 0.88 ng/dL     Narrative:      Results may be falsely increased if patient taking Biotin.      BNP [[id number]]  (Normal) Collected: 05/26/23 1357    Specimen: Blood Updated: 05/26/23 1425     proBNP <36.0 pg/mL     Narrative:      Among patients with dyspnea, NT-proBNP is highly sensitive for the detection of acute congestive heart failure. In addition NT-proBNP of <300 pg/ml effectively rules out acute congestive heart failure with 99% negative predictive value.    Results may be falsely decreased if patient taking Biotin.      Single High Sensitivity Troponin T [[id number]]  (Normal) Collected: 05/26/23 1357    Specimen: Blood Updated: 05/26/23 1425     HS Troponin T <6 ng/L     Narrative:      High Sensitive Troponin T Reference Range:  <10.0 ng/L- Negative Female for AMI  <15.0 ng/L- Negative Male for AMI  >=10 - Abnormal Female indicating possible myocardial injury.  >=15 - Abnormal Male indicating possible myocardial injury.   Clinicians would have to utilize clinical acumen, EKG, Troponin, and serial changes to determine if it is an Acute Myocardial Infarction or myocardial injury due to an underlying chronic condition.         Comprehensive Metabolic Panel [[id number]]  (Abnormal) Collected: 05/26/23 1357    Specimen: Blood Updated: 05/26/23 1421     Glucose 108 mg/dL      BUN 9 mg/dL       "Creatinine 0.83 mg/dL      Sodium 143 mmol/L      Potassium 4.1 mmol/L      Chloride 106 mmol/L      CO2 27.0 mmol/L      Calcium 9.6 mg/dL      Total Protein 6.9 g/dL      Albumin 4.4 g/dL      ALT (SGPT) 17 U/L      AST (SGOT) 17 U/L      Alkaline Phosphatase 111 U/L      Total Bilirubin 0.2 mg/dL      Globulin 2.5 gm/dL      A/G Ratio 1.8 g/dL      BUN/Creatinine Ratio 10.8     Anion Gap 10.0 mmol/L      eGFR 80.3 mL/min/1.73     Narrative:      GFR Normal >60  Chronic Kidney Disease <60  Kidney Failure <15      aPTT [[id number]]  (Abnormal) Collected: 05/26/23 1357    Specimen: Blood Updated: 05/26/23 1418     PTT 24.8 seconds     Protime-INR [[id number]]  (Normal) Collected: 05/26/23 1357    Specimen: Blood Updated: 05/26/23 1418     Protime 10.2 Seconds      INR 0.95    D-dimer, Quantitative [[id number]]  (Normal) Collected: 05/26/23 1357    Specimen: Blood Updated: 05/26/23 1418     D-Dimer, Quantitative 0.42 mg/L (FEU)     Narrative:      According to the assay 's published package insert, a normal (<0.50 mg/L (FEU)) D-dimer result in conjunction with a non-high clinical probability assessment, excludes deep vein thrombosis (DVT) and pulmonary embolism (PE) with high sensitivity.    D-dimer values increase with age and this can make VTE exclusion of an older population difficult. To address this, the [employer], based on best available evidence and recent guidelines, recommends that clinicians use age-adjusted D-dimer thresholds in patients greater than 50 years of age with: a) a low probability of PE who do not meet all Pulmonary Embolism Rule Out Criteria, or b) in those with intermediate probability of PE.   The formula for an age-adjusted D-dimer cut-off is \"age/100\".  For example, a 60 year old patient would have an age-adjusted cut-off of 0.60 mg/L (FEU) and an 80 year old 0.80 mg/L (FEU).    CBC & Differential [[id number]]  (Abnormal) Collected: 05/26/23 1357    Specimen: " Blood Updated: 05/26/23 1417    Narrative:      The following orders were created for panel order CBC & Differential.  Procedure                               Abnormality         Status                     ---------                               -----------         ------                     CBC Auto Differential[[id number]]        Abnormal            Final result                 Please view results for these tests on the individual orders.    CBC Auto Differential [[id number]]  (Abnormal) Collected: 05/26/23 1357    Specimen: Blood Updated: 05/26/23 1417     WBC 7.00 10*3/mm3      RBC 3.85 10*6/mm3      Hemoglobin 10.9 g/dL      Hematocrit 33.3 %      MCV 86.4 fL      MCH 28.4 pg      MCHC 32.8 g/dL      RDW 15.8 %      RDW-SD 47.3 fl      MPV 8.4 fL      Platelets 358 10*3/mm3      Neutrophil % 67.4 %      Lymphocyte % 26.4 %      Monocyte % 4.6 %      Eosinophil % 1.1 %      Basophil % 0.5 %      Neutrophils, Absolute 4.70 10*3/mm3      Lymphocytes, Absolute 1.80 10*3/mm3      Monocytes, Absolute 0.30 10*3/mm3      Eosinophils, Absolute 0.10 10*3/mm3      Basophils, Absolute 0.00 10*3/mm3      nRBC 0.0 /100 WBC           Imaging Results (Most Recent)     Procedure Component Value Units Date/Time    XR Chest 1 View [[id number]] Collected: 05/26/23 1343     Updated: 05/26/23 1346    Narrative:      XR CHEST 1 VW    Date of Exam: 5/26/2023 1:38 PM EDT    Indication: chest pain    Comparison: AP portable chest 8/19/2014.    Findings:  Mild cardiomegaly. Small esophageal hiatal hernia. Clear lungs. No pleural effusion, pneumothorax or acute osseous abnormality.      Impression:      Impression:  Mild cardiomegaly  Small esophageal hiatal hernia  No acute chest findings.      Electronically Signed: Sofiya Dorado    5/26/2023 1:43 PM EDT    Workstation ID: [id number]        reviewed    ECG/EMG Results (most recent)     Procedure Component Value Units Date/Time    ECG 12 Lead Chest Pain [[id number]] Collected: 05/26/23 1232      Updated: 05/27/23 0909     QT Interval 351 ms     Narrative:      HEART RATE= 109  bpm  RR Interval= 548  ms  ME Interval= 155  ms  P Horizontal Axis= 4  deg  P Front Axis= 33  deg  QRSD Interval= 95  ms  QT Interval= 351  ms  QRS Axis= -3  deg  T Wave Axis= 121  deg  - ABNORMAL ECG -  Sinus tachycardia  Nonspecific T abnormalities, lateral leads  When compared with ECG of 09-May-2022 14:26:15,  Significant axis, voltage or hypertrophy change  Electronically Signed By: Nakul Putnam (Cristhian) 27-May-2023 09:09:08  Date and Time of Study: 2023-05-26 12:39:32        reviewed            Microbiology Results (last 10 days)     Procedure Component Value - Date/Time    Respiratory Panel PCR w/COVID-19(SARS-CoV-2) JEFFERY/LARRY/CRISTHIAN/PAD/COR/MAD/JONI In-House, NP Swab in UTM/VTM, 3-4 HR TAT - Swab, Nasopharynx [[id number]]  (Normal) Collected: 05/26/23 1714    Lab Status: Final result Specimen: Swab from Nasopharynx Updated: 05/26/23 1816     ADENOVIRUS, PCR Not Detected     Coronavirus 229E Not Detected     Coronavirus HKU1 Not Detected     Coronavirus NL63 Not Detected     Coronavirus OC43 Not Detected     COVID19 Not Detected     Human Metapneumovirus Not Detected     Human Rhinovirus/Enterovirus Not Detected     Influenza A PCR Not Detected     Influenza B PCR Not Detected     Parainfluenza Virus 1 Not Detected     Parainfluenza Virus 2 Not Detected     Parainfluenza Virus 3 Not Detected     Parainfluenza Virus 4 Not Detected     RSV, PCR Not Detected     Bordetella pertussis pcr Not Detected     Bordetella parapertussis PCR Not Detected     Chlamydophila pneumoniae PCR Not Detected     Mycoplasma pneumo by PCR Not Detected    Narrative:      In the setting of a positive respiratory panel with a viral infection PLUS a negative procalcitonin without other underlying concern for bacterial infection, consider observing off antibiotics or discontinuation of antibiotics and continue supportive care. If the respiratory panel is positive  for atypical bacterial infection (Bordetella pertussis, Chlamydophila pneumoniae, or Mycoplasma pneumoniae), consider antibiotic de-escalation to target atypical bacterial infection.          Assessment & Plan     Chest pain    Abnormal nuclear stress test     Chest Pain   -Troponin negative x2  -BNP less than 36  -Chest x-ray showed mild cardiomegaly with small esophageal hiatal hernia with no acute chest findings  -Respiratory viral panel negative  -Continuous cardiac monitor  -EKG shows sinus tachycardia with heart of 109 with nonspecific T abnormalities with QT interval of 351 ms  -Myoview showed normal stress test with a EF of over 70% with small size, moderately severe area of ischemia in the lateral wall  -Cardiology consult     Generalized anxiety disorder  -Continue Cymbalta     GERD  -Continue PPI    I discussed the patients findings and my recommendations with patient.     Discharge Diagnosis:      Chest pain    Abnormal nuclear stress test      Hospital Course  Patient is a 63 y.o. female presented with chest pain.  Patient presented with chest pain and shortness of breath that started yesterday.  Patient that she has some diaphoresis and nausea as well as tachycardia.  Patient denies fever, syncope or abdominal pain.  Patient reports nonproductive cough and feels like something is stuck in her chest with heaviness.  Troponins negative x2.  BNP less than 36.  Chest x-ray did show mild cardiomegaly with small esophageal hiatal hernia with no acute chest findings.  Patient states she did take omeprazole with minimal relief.  Respiratory viral panel negative.  EKG shows sinus tachycardia with heart of 109 with nonspecific T abnormalities with QT interval 351 ms. Myoview showed normal stress test with EF over 70% with small sized moderately severe area of ischemia lateral wall with cardiology consult for further evaluation.  Patient underwent cardiac catheterization which revealed nonobstructive coronary  disease and normal LVEDP.  Patient advised to monitor blood pressures at home.  Patient to follow-up with GI, urology and cardiology at scheduled appointments.  Patient to follow PCP in 1 week.  Patient take medication as prescribed.  If symptoms worsen patient call [phone] or go to nearest ED.    Past Medical History:     Past Medical History:   Diagnosis Date   • Anesthesia complication     pt states she needs extra d/t red hair   • Arthritis    • Depressive disorder    • Hypothyroid    • Migraine    • MRSA infection     on hands   • PONV (postoperative nausea and vomiting)        Past Surgical History:     Past Surgical History:   Procedure Laterality Date   • BACK SURGERY     • GALLBLADDER SURGERY     • HYSTERECTOMY     • MASS EXCISION Right 5/11/2022    Procedure: LIPOMA EXCISION from right shoulder;  Surgeon: Benjamin Munguia MD;  Location: [facility];  Service: General;  Laterality: Right;       Social History:   Social History     Socioeconomic History   • Marital status:    • Number of children: 2   • Years of education: 14   Tobacco Use   • Smoking status: Never   • Smokeless tobacco: Never   Vaping Use   • Vaping Use: Never used   Substance and Sexual Activity   • Alcohol use: Not Currently   • Drug use: Not Currently   • Sexual activity: Defer       Procedures Performed    Procedure(s):  Left Heart Cath and coronary angiogram  -------------------       Consults:   Consults     Date and Time Order Name Status Description    5/27/2023 10:31 AM Inpatient Cardiology Consult Completed           Condition on Discharge:     Stable    Discharge Disposition      Discharge Medications     Discharge Medications      ASK your doctor about these medications      Instructions Start Date   DULoxetine 30 MG capsule  Commonly known as: CYMBALTA   90 mg, Oral, Every Morning      ezetimibe 10 MG tablet  Commonly known as: ZETIA   10 mg, Oral, Daily      omeprazole 40 MG capsule  Commonly known as: priLOSEC   Take 1  capsule by mouth Daily.             Discharge Diet:     Activity at Discharge:     Follow-up Appointments  Future Appointments   Date Time Provider Department Center   6/30/2023  1:15 PM Tan Marrero MD [facility]         Test Results Pending at Discharge       Risk for Readmission (LACE) Score: 1 (5/27/2023  6:01 AM)          CHE Vazquez  05/27/23  15:24 EDT        I spent 35 minutes caring for Gina on this date of service. This time includes time spent by me in the following activities: reviewing tests, obtaining and/or reviewing a separately obtained history, performing a medically appropriate examination and/or evaluation, counseling and educating the patient/family/caregiver, ordering medications, tests, or procedures, referring and communicating with other health care professionals, documenting information in the medical record, independently interpreting results and communicating that information with the patient/family/caregiver and care coordination.

## 2023-05-30 NOTE — CASE MANAGEMENT/SOCIAL WORK
Case Management Discharge Note      Final Note: Home       \Transportation Services  Private: Car    Final Discharge Disposition Code: 01 - home or self-care

## 2023-07-27 ENCOUNTER — OFFICE VISIT (OUTPATIENT)
Dept: SLEEP MEDICINE | Facility: CLINIC | Age: 63
End: 2023-07-27
Payer: MEDICARE

## 2023-07-27 VITALS
DIASTOLIC BLOOD PRESSURE: 68 MMHG | BODY MASS INDEX: 35.5 KG/M2 | HEART RATE: 87 BPM | RESPIRATION RATE: 12 BRPM | SYSTOLIC BLOOD PRESSURE: 110 MMHG | HEIGHT: 60 IN | OXYGEN SATURATION: 93 % | WEIGHT: 180.8 LBS

## 2023-07-27 DIAGNOSIS — G47.33 OBSTRUCTIVE SLEEP APNEA, ADULT: Primary | ICD-10-CM

## 2023-07-27 PROCEDURE — G0463 HOSPITAL OUTPT CLINIC VISIT: HCPCS

## 2023-07-27 RX ORDER — VIBEGRON 75 MG/1
TABLET, FILM COATED ORAL
COMMUNITY

## 2023-07-27 NOTE — PROGRESS NOTES
"  [facility]  ***    Gina Wright  [id number]   1960  63 y.o.  female      PCP:Rosemarie Peng APRN    Type of service: Initial New Patient Office Visit  Date of service: 7/27/2023        CHIEF COMPLAINT: ***      HISTORY OF PRESENT ILLNESS:  Gina Wright 63 y.o.  presents to the clinic today as a new patient to me and was seen for sleep-related problems of snoring, non-restorative sleep, and witnessed apneas***. The symptoms are chronic and persistent in nature***.  Patient has ***history of *** tonsillectomy, adenoidectomy, nasal surgery, UPPP. ***        PATIENT HISTORY:  Sleep schedule:  Bedtime: ***   Wake time: ***   Normally takes *** to fall asleep  Average hours of sleep: ***  Number of naps per day: ***    Symptoms:  In addition to the above, patient reports:   Have you ever awakened gasping for breath, coughing, choking: {YES NO:61372::\"NO\"}   Change in weight:  {YES NO:19267::\"NO\"} ***  Morning headaches:  {YES NO:60484::\"NO\"}   Awaken with a sore throat or dry mouth:  {YES NO:22293::\"NO\"}   Leg jerking at night:  {YES NO:19627::\"NO\"}   Creepy crawly feeling in legs/urge to move legs: {YES NO:63538::\"Yes\"}   Teeth grinding: {YES NO:67962::\"NO\"}   Have you ever awakened at night with a sour taste or burning sensation in your chest:  {YES NO:57172::\"NO\"}   Do you have muscle weakness with laughing or anger:  {YES NO:70821::\"NO\"}   Have you ever felt paralyzed while going to sleep or waking up:  {YES NO:10289::\"NO\"}   Sleepwalking: {YES NO:93835::\"NO\"}   Nightmares: {YES NO:52905::\"NO\"}   Nocturia (urination at night): *** times per night  Memory Problem: {YES NO:22350::\"NO\"}     Past medical history: (Relevant to sleep medicine)  ***      Social history:  Do you drive a commercial vehicle:  {YES NO:96776::\"NO\"}   Shift work:  {YES NO:99867::\"NO\"}   Tobacco use:  {YES NO:41950::\"NO\"}  Alcohol use:  *** per week  Caffeinated drinks: *** per day      Family history (parents, " "siblings, children) (relevant to sleep medicine):  ***    Medications: reviewed     ALLERGIES: Compazine [prochlorperazine], Ferumoxytol, Milk (cow), Sulfa antibiotics, Atorvastatin, Ciprofloxacin, Dairy aid [tilactase], Eggs or egg-derived products, Iron, Prochlorperazine edisylate, Tomato, and Trazodone        REVIEW OF SYSTEMS:  Full review of systems available on the intake form which is scanned in the media tab.  The relevant positives are noted below:  [place] Sleepiness Scale: Total score: 2   Fatigue  Snoring  ***      PHYSICAL EXAM:  Vitals:    07/27/23 1331   BP: 110/68   BP Location: Left arm   Patient Position: Sitting   Cuff Size: Adult   Pulse: 87   Resp: 12   SpO2: 93%   Weight: 82 kg (180 lb 12.8 oz)   Height: 152.4 cm (60\")    Body mass index is 35.31 kg/m².    HEAD: atraumatic, normocephalic  THROAT: tonsils are ***, Mallampati class ***  NECK:  , trachea is in the midline  RESPIRATORY SYSTEM: Lung sounds clear, no wheezes noted  CARDIOVASULAR SYSTEM: Regular rhythm and normal rate, no edema  EXTREMITES: No cyanosis or clubbing  NEUROLOGICAL SYSTEM: Alert and oriented x 3, no gross motor defects, gait normal    Office notes from care team reviewed. Office note dated ***, reviewed.      Labs reviewed.  TSH Results:  TSH          12/29/2022    10:34 4/24/2023    08:59 5/26/2023    13:57   TSH   TSH 2.180  3.760  3.830       Most Recent A1C          5/27/2023    03:11   HGBA1C Most Recent   Hemoglobin A1C 6.00     ***        ASSESSMENT AND PLAN:   Witnessed apnea (R06.81)***: patient's symptoms and physical examination are concerning for possible sleep apnea (G47.30).   I discussed the signs, symptoms, and pathophysiology of sleep apnea with this patient.  I also discussed the potential complications of untreated sleep apnea including but not limited to resistant hypertension, insulin resistance, pulmonary hypertension, atrial fibrillation, stroke, nonrestorative sleep with hypersomnia which can " increase risk for motor vehicle accidents, etc.   Different testing methods including home-based and lab based sleep studies were discussed with this patient.   Based on patient history and physical examination, the most appropriate study is ***.  The order for the sleep study is placed in [facility].  The test will be scheduled after prior authorization has been obtained through patient's insurance.  Treatment and management will be discussed in more detail with this patient after the test is completed.  All questions were answered to patient's satisfaction.   Snoring (R06.83): snoring is the sound created by turbulent airflow vibrating upper airway soft tissue.  I have also discussed factors affecting snoring including sleep deprivation, sleeping on the back and alcohol ingestion. To minimize snoring, patient is advised to have adequate sleep, sleep on their side, and avoid alcohol and sedative medications around bedtime.   Excessive daytime sleepiness***: [place] Sleepiness Scale of Total score: 2.  There are many causes of excessive daytime sleepiness including but not limited to sleep apnea, shiftwork syndrome, depression, and other medical disorders such as heart disease, kidney disease, and liver failure.  The most serious cause of excessive sleepiness is due to neurological conditions such as narcolepsy/cataplexy.  More commonly excessive sleepiness is caused by sleep apnea with frequent awakenings during sleep time.  I have discussed safety of driving and to remain vigilant while driving.  Obesity***: Body mass index is 35.31 kg/m².. Patients who are overweight or obese are at increased risk of sleep apnea/ sleep disordered breathing. Weight reduction and healthy lifestyle are encouraged in overweight/ obese patients as part of a comprehensive approach to sleep apnea treatment.    ***    I have also discussed the following:  Sleep hygiene: try to maintain a regular bed time and wake time, avoid watching TV/ using  electronic devices in bed (including cell phones), limit caffeinated and alcoholic beverages before bed, try to maintain a cool and quiet sleep environment, avoid daytime naps  Adequate amount of sleep: most people need around 7 to 8 hours of sleep each night        Patient will follow-up after study, 31 to 90 days after PAP therapy initiated if applicable,*** or sooner for issues or concerns.  Patient's questions were answered.        Thank you for allowing me to participate in the care of this patient.    Yamila Rios DNP, APRN  [facility]

## 2023-07-27 NOTE — PROGRESS NOTES
[address] [address]  Phone   Fax      Gina Wright  [id number]   1960  63 y.o.  female      PCP:Rosemarie Peng APRN    Type of service: Initial New Patient Office Visit  Date of service: 7/27/2023          CHIEF COMPLAINT: Obstructive sleep apnea      HISTORY OF PRESENT ILLNESS:  Gina Wright 63 y.o.  presents to the clinic today as a new patient to me and was seen to establish care for treatment and management of obstructive sleep apnea. Patient has history of tonsillectomy, adenoidectomy in the past.  Had deviated septum fixed 23 years ago.  On seroquel 100mg at bedtime per psychiatry to help with sleep.  Patient had a home sleep study 10/1/2022 showing obstructive sleep apnea with AHI of 11.7/h.  Her lowest desaturation was 73%.  She was started on auto CPAP at 5 to 20 cm H2O. unfortunately she was dealing with some insomnia issues around that time and really was not sleeping much.  She ended up having to return CPAP to the company.  She did have issues with the pressures, never felt that they were right.  She now has addressed her insomnia issues, her psychiatrist has put her on Seroquel and she is sleeping better.  She is wanting to look at readdressing her sleep apnea, cardiology especially recommended this after she was having some issues recently.  Luckily her heart cath showed nonobstructive CAD.  EF was normal.        PATIENT HISTORY:  Sleep schedule:  Bedtime: 11 PM or so  Wake time: 11 AM or so  Average sleep: 3 to 4 hours at a time  Naps: 0    Symptoms:  In addition to the above, patient reports:   Have you ever awakened gasping for breath, coughing, choking: Yes   Change in weight:  Yes, gained  Morning headaches:  Yes   Awaken with a sore throat or dry mouth:  Yes   Leg jerking at night:  Yes   Creepy crawly feeling in legs/urge to move legs: Yes, not improved with movement  Teeth grinding: Yes   Have you ever  "awakened at night with a sour taste or burning sensation in your chest:  No   Do you have muscle weakness with laughing or anger:  No   Have you ever felt paralyzed while going to sleep or waking up:  No   Sleepwalking: No   Nightmares: No   Nocturia (urination at night): 6 times per night  Memory Problem: No     Past medical history: (Relevant to sleep medicine)  Anxiety/depression  Arthritis  Hyperlipidemia  Nonobstructive CAD      Social history:  Do you drive a commercial vehicle:  No   Shift work:  No   Tobacco use:  No  Alcohol use:  6 per week  Caffeinated drinks: 1 per day  Occupation: On disability      Family history (parents, siblings, children) (relevant to sleep medicine):  Heart attack  Hypertension  Stroke    Medications: reviewed     ALLERGIES: Compazine [prochlorperazine], Ferumoxytol, Milk (cow), Sulfa antibiotics, Atorvastatin, Ciprofloxacin, Dairy aid [tilactase], Eggs or egg-derived products, Iron, Prochlorperazine edisylate, Tomato, and Trazodone        REVIEW OF SYSTEMS:  Full review of systems available on the intake form which is scanned in the media tab.  The relevant positives are noted below:  [place] Sleepiness Scale: Total score: 2   Fatigue  Snoring  Nasal congestion  Frequent urination  Joint pain        PHYSICAL EXAM:  Vitals:    07/27/23 1331   BP: 110/68   BP Location: Left arm   Patient Position: Sitting   Cuff Size: Adult   Pulse: 87   Resp: 12   SpO2: 93%   Weight: 82 kg (180 lb 12.8 oz)   Height: 152.4 cm (60\")    Body mass index is 35.31 kg/m².    HEAD: atraumatic, normocephalic  THROAT: tonsils are surgically absent, Mallampati class IV  NECK:  , trachea is in the midline  RESPIRATORY SYSTEM: Respirations even, unlabored, normal rate  CARDIOVASULAR SYSTEM: Normal rate, no edema  EXTREMITES: No cyanosis or clubbing  NEUROLOGICAL SYSTEM: Alert and oriented x 3, no gross motor defects, gait normal    Office notes from care team reviewed. Office note dated 6/2023, " reviewed.      Labs reviewed.  TSH Results:  TSH          12/29/2022    10:34 4/24/2023    08:59 5/26/2023    13:57   TSH   TSH 2.180  3.760  3.830       Most Recent A1C          5/27/2023    03:11   HGBA1C Most Recent   Hemoglobin A1C 6.00               ASSESSMENT AND PLAN:   Obstructive Sleep Apnea (G 47.33): Patient had sleep study within the last year showing mild obstructive sleep apnea with AHI of 11.7/h.  She was dealing with some insomnia issues and had issues with CPAP pressures in the past.  She has not addressed her insomnia with her psychiatrist and the sleeping better at night.  She would like to look at treating sleep apnea again.  Without adequate treatment of sleep apnea and without good compliance, patient would be at increased risk of hypertension, diabetes mellitus, pulmonary hypertension, atrial fibrillation, stroke, and nonrestorative sleep with hypersomnia which could increase risk of motor vehicle accidents.  We will proceed with titration study and if she does not tolerate CPAP then may need BiPAP titration.  She will bring all of her normal nighttime medications with her during that study, including her Seroquel.  Obesity: Body mass index is 35.31 kg/m².. Patients who are overweight or obese are at increased risk of sleep apnea/ sleep disordered breathing. Weight reduction and healthy lifestyle are encouraged in overweight/ obese patients as part of a comprehensive approach to sleep apnea treatment.    Non-obstructive CAD seen on 5/2023 heart cath   Mood disorder  Insomnia: Likely related to mood disorders.  Continue to follow psychiatry.  We did discuss the role that untreated sleep apnea can also play and insomnia.  Frequent urination  Arthritis  Prediabetes  Hypertension    Patient will follow-up after study, 31 to 90 days after PAP therapy initiated, or sooner for issues or concerns.  Patient's questions were answered.        Thank you for allowing me to participate in the care of this  patient.    Yamila Rios DNP, APRN  [facility]

## 2023-07-27 NOTE — PROGRESS NOTES
"  [facility]  ***    Gina Wright  [id number]   1960  63 y.o.  female      PCP:Rosemarie Peng APRN    Type of service: Initial New Patient Office Visit  Date of service: 7/27/2023        CHIEF COMPLAINT: ***      HISTORY OF PRESENT ILLNESS:  Gina Wright 63 y.o.  presents to the clinic today as a new patient to me and was seen for sleep-related problems of snoring, non-restorative sleep, and witnessed apneas***. The symptoms are chronic and persistent in nature***.  Patient has history of tonsillectomy, adenoidectomy in the past.  Had deviated septum fixed 23 years ago.  On seroquel 100mg at bedtime per psychiatry to help with sleep.          PATIENT HISTORY:  Sleep schedule:  Bedtime: ***   Wake time: ***   Normally takes *** to fall asleep  Average hours of sleep: ***  Number of naps per day: ***    Symptoms:  In addition to the above, patient reports:   Have you ever awakened gasping for breath, coughing, choking: {YES NO:68031::\"NO\"}   Change in weight:  {YES NO:27108::\"NO\"} ***  Morning headaches:  {YES NO:18152::\"NO\"}   Awaken with a sore throat or dry mouth:  {YES NO:21967::\"NO\"}   Leg jerking at night:  {YES NO:58798::\"NO\"}   Creepy crawly feeling in legs/urge to move legs: {YES NO:53793::\"Yes\"}   Teeth grinding: {YES NO:38144::\"NO\"}   Have you ever awakened at night with a sour taste or burning sensation in your chest:  {YES NO:15286::\"NO\"}   Do you have muscle weakness with laughing or anger:  {YES NO:74327::\"NO\"}   Have you ever felt paralyzed while going to sleep or waking up:  {YES NO:07331::\"NO\"}   Sleepwalking: {YES NO:53485::\"NO\"}   Nightmares: {YES NO:46810::\"NO\"}   Nocturia (urination at night): *** times per night  Memory Problem: {YES NO:05585::\"NO\"}     Past medical history: (Relevant to sleep medicine)  ***      Social history:  Do you drive a commercial vehicle:  {YES NO:61829::\"NO\"}   Shift work:  {YES NO:50513::\"NO\"}   Tobacco use:  {YES NO:91074::\"NO\"}  Alcohol " "use:  *** per week  Caffeinated drinks: *** per day      Family history (parents, siblings, children) (relevant to sleep medicine):  ***    Medications: reviewed     ALLERGIES: Compazine [prochlorperazine], Ferumoxytol, Milk (cow), Sulfa antibiotics, Atorvastatin, Ciprofloxacin, Dairy aid [tilactase], Eggs or egg-derived products, Iron, Prochlorperazine edisylate, Tomato, and Trazodone        REVIEW OF SYSTEMS:  Full review of systems available on the intake form which is scanned in the media tab.  The relevant positives are noted below:  [place] Sleepiness Scale: Total score: 2   Fatigue  Snoring  ***      PHYSICAL EXAM:  Vitals:    07/27/23 1331   BP: 110/68   BP Location: Left arm   Patient Position: Sitting   Cuff Size: Adult   Pulse: 87   Resp: 12   SpO2: 93%   Weight: 82 kg (180 lb 12.8 oz)   Height: 152.4 cm (60\")    Body mass index is 35.31 kg/m².    HEAD: atraumatic, normocephalic  THROAT: tonsils are surgically absent, Mallampati class IV  NECK:  , trachea is in the midline  RESPIRATORY SYSTEM: Respirations even, unlabored, normal rate  CARDIOVASULAR SYSTEM: Normal rate, no edema  EXTREMITES: No cyanosis or clubbing  NEUROLOGICAL SYSTEM: Alert and oriented x 3, no gross motor defects, gait normal    Office notes from care team reviewed. Office note dated 6/2023, reviewed.      Labs reviewed.  TSH Results:  TSH          12/29/2022    10:34 4/24/2023    08:59 5/26/2023    13:57   TSH   TSH 2.180  3.760  3.830       Most Recent A1C          5/27/2023    03:11   HGBA1C Most Recent   Hemoglobin A1C 6.00               ASSESSMENT AND PLAN:   Obstructive Sleep Apnea (G 47.33): sleep apnea symptoms have ***improved with the device and treatment.  Patient has excellent compliance with the device*** for treatment of sleep apnea.  I have personally reviewed the smart card download and discussed the download data with the patient and encouarged continued use of the device.  The residual AHI is acceptable. The device is " benefiting the patient and the device is medically necessary.  Without adequate treatment of sleep apnea and without good compliance, patient would be at increased risk of hypertension, diabetes mellitus, pulmonary hypertension, atrial fibrillation, stroke, and nonrestorative sleep with hypersomnia which could increase risk of motor vehicle accidents. The patient is also instructed to get the supplies from the [employer] company and and change them on a regular basis.  A prescription for supplies has been sent to the [employer].  I have also discussed with this patient the importance of good sleep hygiene and getting an adequate amount of sleep to aid in overall good health.    Obesity: Body mass index is 35.31 kg/m².. Patients who are overweight or obese are at increased risk of sleep apnea/ sleep disordered breathing. Weight reduction and healthy lifestyle are encouraged in overweight/ obese patients as part of a comprehensive approach to sleep apnea treatment.    Non-obstructive CAD seen on 5/2023 heart cath   Insomnia    I have also discussed the following:  Sleep hygiene: try to maintain a regular bed time and wake time, avoid watching TV/ using electronic devices in bed (including cell phones), limit caffeinated and alcoholic beverages before bed, try to maintain a cool and quiet sleep environment, avoid daytime naps  Adequate amount of sleep: most people need around 7 to 8 hours of sleep each night        Patient will follow-up after study, 31 to 90 days after PAP therapy initiated if applicable,*** or sooner for issues or concerns.  Patient's questions were answered.        Thank you for allowing me to participate in the care of this patient.    Yamila Rios, BELLA, APRN  [facility]

## 2023-08-01 ENCOUNTER — TELEPHONE (OUTPATIENT)
Dept: CARDIOLOGY | Facility: CLINIC | Age: 63
End: 2023-08-01
Payer: MEDICARE

## 2023-08-03 ENCOUNTER — TRANSCRIBE ORDERS (OUTPATIENT)
Dept: ADMINISTRATIVE | Facility: HOSPITAL | Age: 63
End: 2023-08-03
Payer: MEDICARE

## 2023-08-03 DIAGNOSIS — E78.2 MIXED HYPERLIPIDEMIA: Primary | ICD-10-CM

## 2023-08-03 DIAGNOSIS — Z88.8 ALLERGY TO STATIN MEDICATION: ICD-10-CM

## 2023-08-17 PROBLEM — Z88.8 ALLERGY TO STATIN MEDICATION: Status: ACTIVE | Noted: 2023-08-17

## 2023-08-17 PROBLEM — E78.2 MIXED HYPERLIPIDEMIA: Status: ACTIVE | Noted: 2023-08-17

## 2023-08-17 RX ORDER — FAMOTIDINE 10 MG/ML
20 INJECTION, SOLUTION INTRAVENOUS AS NEEDED
OUTPATIENT
Start: 2023-08-23

## 2023-08-17 RX ORDER — MEPERIDINE HYDROCHLORIDE 25 MG/ML
25 INJECTION INTRAMUSCULAR; INTRAVENOUS; SUBCUTANEOUS AS NEEDED
OUTPATIENT
Start: 2023-08-23

## 2023-08-17 RX ORDER — DIPHENHYDRAMINE HYDROCHLORIDE 50 MG/ML
50 INJECTION INTRAMUSCULAR; INTRAVENOUS AS NEEDED
OUTPATIENT
Start: 2023-08-23

## 2023-08-23 ENCOUNTER — HOSPITAL ENCOUNTER (OUTPATIENT)
Dept: ONCOLOGY | Facility: HOSPITAL | Age: 63
Discharge: HOME | End: 2023-08-23
Admitting: NURSE PRACTITIONER
Payer: MEDICARE

## 2023-08-23 VITALS — SYSTOLIC BLOOD PRESSURE: 124 MMHG | DIASTOLIC BLOOD PRESSURE: 64 MMHG | HEART RATE: 86 BPM

## 2023-08-23 DIAGNOSIS — Z88.8 ALLERGY TO STATIN MEDICATION: Primary | ICD-10-CM

## 2023-08-23 DIAGNOSIS — E78.2 MIXED HYPERLIPIDEMIA: ICD-10-CM

## 2023-08-23 PROCEDURE — 25010000002 INCLISIRAN SODIUM 284 MG/1.5ML SOLUTION PREFILLED SYRINGE: Performed by: NURSE PRACTITIONER

## 2023-08-23 PROCEDURE — 96372 THER/PROPH/DIAG INJ SC/IM: CPT

## 2023-08-23 RX ORDER — MEPERIDINE HYDROCHLORIDE 25 MG/ML
25 INJECTION INTRAMUSCULAR; INTRAVENOUS; SUBCUTANEOUS AS NEEDED
OUTPATIENT
Start: 2023-11-21

## 2023-08-23 RX ORDER — DIPHENHYDRAMINE HYDROCHLORIDE 50 MG/ML
50 INJECTION INTRAMUSCULAR; INTRAVENOUS AS NEEDED
OUTPATIENT
Start: 2023-11-21

## 2023-08-23 RX ORDER — FAMOTIDINE 10 MG/ML
20 INJECTION, SOLUTION INTRAVENOUS AS NEEDED
OUTPATIENT
Start: 2023-11-21

## 2023-08-23 RX ADMIN — INCLISIRAN 284 MG: 284 INJECTION, SOLUTION SUBCUTANEOUS at 14:40

## 2023-08-23 NOTE — PROGRESS NOTES
Patient here for Leqvio injection. Patient received for first treatment of Leqvion and after time lapse patient voiced no complaints and no signs or symptoms of reaction noted Patient tolerated injection well.

## 2023-08-28 ENCOUNTER — HOSPITAL ENCOUNTER (OUTPATIENT)
Dept: SLEEP MEDICINE | Facility: HOSPITAL | Age: 63
Discharge: HOME | End: 2023-08-28
Admitting: NURSE PRACTITIONER
Payer: MEDICARE

## 2023-08-28 DIAGNOSIS — G47.33 OBSTRUCTIVE SLEEP APNEA, ADULT: ICD-10-CM

## 2023-08-28 PROCEDURE — 95811 POLYSOM 6/>YRS CPAP 4/> PARM: CPT

## 2023-08-29 VITALS — WEIGHT: 180.78 LBS | BODY MASS INDEX: 35.49 KG/M2 | HEIGHT: 60 IN

## 2023-08-31 DIAGNOSIS — G47.33 OBSTRUCTIVE SLEEP APNEA, ADULT: Primary | ICD-10-CM

## 2023-10-25 ENCOUNTER — TELEPHONE (OUTPATIENT)
Dept: CARDIOLOGY | Facility: CLINIC | Age: 63
End: 2023-10-25
Payer: MEDICARE

## 2023-10-25 NOTE — TELEPHONE ENCOUNTER
Caller: Gina Wright    Relationship: Self    Best call back number: [phone]    What is the best time to reach you: ANYTIME    Who are you requesting to speak with (clinical staff, provider,  specific staff member): CLINICAL        What was the call regarding: PT  HAS STARTED HAVING LOW BP FOR THE LAST WEEK.  TODAY IT IS 96/44, WHICH IS ABOUT  WHAT IT HAS BEEN FOR THE LAST WEEK.  SHE IS TAKING CARVEDILOL 6.25 MG 2 TIMES DAILY.  PLEASE ADVISE PT.    Is it okay if the provider responds through MyChart: NO

## 2023-10-25 NOTE — TELEPHONE ENCOUNTER
Returned call to patient regarding her B/P reading for the past week. Patient said it is 96/44 today, and 97/66 that was taken by a nurse yesterday-when she received Spravato.  Patient said her HR runs in the 70's.  Gina denies any dizziness/lightness and no c/o nausea/unsteadiness, she is concerned about her low SBP readings.  At LOV with Dr. Christensen on 6/21/23-B/P 124/69.  The only cardiac med patient currently takes is Coreg 6.25mg BID.  Informed patient that I will notify Dr. Christensen/[facility] Lopez and call patient back with his recommendation. Patient verbalizes understanding.    Please advise,    Thanks Sarai

## 2023-10-26 NOTE — TELEPHONE ENCOUNTER
As long as her systolic BP is greater than 90 and she is asymptomatic, no changes needed at this time. If she becomes symptomatic of SBP <90 mmHg, then she should call us.

## 2023-10-26 NOTE — TELEPHONE ENCOUNTER
Placed a call to patient and notified her of recommendations from [facility] Lopez that as long as her systolic BP is greater than 90 and she is asymptomatic, no changes needed at this time. If she becomes symptomatic of SBP <90 mmHg, then she should call us.  Patient verbalized understanding.

## 2023-11-29 ENCOUNTER — HOSPITAL ENCOUNTER (OUTPATIENT)
Dept: ONCOLOGY | Facility: HOSPITAL | Age: 63
Discharge: HOME | End: 2023-11-29
Admitting: NURSE PRACTITIONER
Payer: MEDICARE

## 2023-11-29 VITALS — HEART RATE: 84 BPM | DIASTOLIC BLOOD PRESSURE: 62 MMHG | SYSTOLIC BLOOD PRESSURE: 102 MMHG

## 2023-11-29 DIAGNOSIS — Z88.8 ALLERGY TO STATIN MEDICATION: ICD-10-CM

## 2023-11-29 DIAGNOSIS — E78.2 MIXED HYPERLIPIDEMIA: Primary | ICD-10-CM

## 2023-11-29 PROCEDURE — 96372 THER/PROPH/DIAG INJ SC/IM: CPT

## 2023-11-29 RX ORDER — DIPHENHYDRAMINE HYDROCHLORIDE 50 MG/ML
50 INJECTION INTRAMUSCULAR; INTRAVENOUS AS NEEDED
Status: DISCONTINUED | OUTPATIENT
Start: 2023-11-29 | End: 2023-11-30 | Stop reason: HOSPADM

## 2023-11-29 RX ORDER — DIPHENHYDRAMINE HYDROCHLORIDE 50 MG/ML
50 INJECTION INTRAMUSCULAR; INTRAVENOUS AS NEEDED
OUTPATIENT
Start: 2024-02-19

## 2023-11-29 RX ORDER — FAMOTIDINE 10 MG/ML
20 INJECTION, SOLUTION INTRAVENOUS AS NEEDED
Status: DISCONTINUED | OUTPATIENT
Start: 2023-11-29 | End: 2023-11-30 | Stop reason: HOSPADM

## 2023-11-29 RX ORDER — FAMOTIDINE 10 MG/ML
20 INJECTION, SOLUTION INTRAVENOUS AS NEEDED
OUTPATIENT
Start: 2024-02-19

## 2023-11-29 NOTE — PROGRESS NOTES
Patient here for Leqvio injection. Patient received Leqvio injection. Patient tolerated injection well and required no PRN medicines. Patient natalie  re of next appointment.

## 2023-12-14 ENCOUNTER — OFFICE VISIT (OUTPATIENT)
Dept: SLEEP MEDICINE | Facility: CLINIC | Age: 63
End: 2023-12-14
Payer: MEDICARE

## 2023-12-14 VITALS
SYSTOLIC BLOOD PRESSURE: 113 MMHG | HEIGHT: 60 IN | BODY MASS INDEX: 34.95 KG/M2 | OXYGEN SATURATION: 94 % | HEART RATE: 73 BPM | DIASTOLIC BLOOD PRESSURE: 55 MMHG | WEIGHT: 178 LBS

## 2023-12-14 DIAGNOSIS — G47.33 OBSTRUCTIVE SLEEP APNEA, ADULT: Primary | ICD-10-CM

## 2023-12-14 DIAGNOSIS — E66.9 CLASS 1 OBESITY WITH BODY MASS INDEX (BMI) OF 34.0 TO 34.9 IN ADULT, UNSPECIFIED OBESITY TYPE, UNSPECIFIED WHETHER SERIOUS COMORBIDITY PRESENT: ICD-10-CM

## 2023-12-14 PROCEDURE — G0463 HOSPITAL OUTPT CLINIC VISIT: HCPCS

## 2023-12-14 NOTE — PROGRESS NOTES
[address] [address]  Phone   Fax         SLEEP CLINIC FOLLOW-UP PROGRESS NOTE    Gina Wright  [id number]   1960  63 y.o.  female      PCP: Rosemarie Peng APRN    DATE OF VISIT: 12/14/2023            CHIEF COMPLAINT: Obstructive sleep apnea    HPI:  This is a 63 y.o. year old patient who presents to the clinic today for the management of obstructive sleep apnea.  She had a sleep study 10/2022 showing AHI of 11.7/h.  Lowest oxygen desaturation was 73%.  She tried auto CPAP but could not get the pressures to a point she tolerated and also was not sleeping well in general at that time.  She was seen in the office 7/2023 which point her insomnia was improved after working with psychiatrist.  She underwent titration study 8/2023 and was titrated to CPAP at set pressure of 12 cm H2O, did not tolerate BiPAP.  F and P. Vi Terra fullface mask size medium was used.  He now presents today for initial follow-up and compliance check.  Usually goes to bed around 10 to 11 PM, may get out of bed around 11 AM.  Overall good usage and control of sleep apnea with residual AHI of 0.9/h.  She has had some issues with increasing mask leakage.  Has not gotten any new silicone pieces for the mask since she has had it.  Tried previous facemask styles in the past without luck.      MEDICATIONS: reviewed     ALLERGIES:  Atorvastatin, Compazine [prochlorperazine], Crestor [rosuvastatin], Ferumoxytol, Milk (cow), Sulfa antibiotics, Ciprofloxacin, Dairy aid [tilactase], Eggs or egg-derived products, Iron, Prochlorperazine edisylate, Tomato, and Trazodone    SOCIAL HISTORY (habits pertaining to sleep medicine):  History tobacco use: No   History of alcohol use: 0 per week  Caffeine use: 0     REVIEW OF SYSTEMS:   Pertinent positive symptoms are:  Harmony Sleepiness Scale :Total score: 0   Occasional headache  Occasional nasal congestion  Depression: Treating,  "follows with psychiatrist        PHYSICAL EXAMINATION:  CONSTITUTIONAL:  Vitals:    12/14/23 1100   BP: 113/55   BP Location: Right arm   Patient Position: Sitting   Pulse: 73   SpO2: 94%   Weight: 80.7 kg (178 lb)   Height: 152.4 cm (60\")    Body mass index is 34.76 kg/m².   HEAD: atraumatic, normocephalic  RESP SYSTEM: not in respiratory distress, breathing unlabored  CARDIOVASULAR: normal rate, no edema noted   NEURO: Alert and oriented x 3, mood and affect appeared appropriate      DATA REVIEWED:  The Smart card downloaded on 12/11/2023 has been reviewed independently by me for compliance and discussed the data with the patient.   Compliance: 93%  More than 4 hr use: 80%  Average use of the device: 7 hours 58 minutes per night  Residual AHI: 0.9 /hr (goal < 5.0 /hr)  Mask type: Fullface  Device: ResMed AirSense 10  DME: [employer] [place]          ASSESSMENT AND PLAN:  Obstructive Sleep Apnea (G 47.33): sleep apnea has improved with the device and treatment.  Patient has excellent compliance with the device for treatment of sleep apnea.  I have personally reviewed the smart card download and discussed the download data with the patient and encouarged continued use of the device.  The residual AHI is acceptable. The device is benefiting the patient and the device is medically necessary.  Without adequate treatment of sleep apnea and without good compliance, patient would be at increased risk of hypertension, diabetes mellitus, pulmonary hypertension, atrial fibrillation, stroke, and nonrestorative sleep with hypersomnia which could increase risk of motor vehicle accidents. The patient is also instructed to get the supplies from the [employer] and and change them on a regular basis.  A prescription for supplies has been sent to the [employer].  I have also discussed with this patient the importance of good sleep hygiene and getting an adequate amount of sleep to aid in overall good health.  Having mask leakage.  " Has not gotten any new supplies since titration study.  Have placed supply order today.  She will let us know if leakage does not improve with new mask and headgear at which point may need mask fitting for alternate style.  Has not tolerated nasal type masks in the past due to mouth breathing.  She is using CPAP pillow.  Morning fatigue: She reports her psychiatrist did tell her she could take 2 Seroquel at night for trouble sleeping.  She did go up to this dose due to mask leakage waking her up at night.  She has trouble waking up early in the morning and we did discuss that some sleep aids can cause hangover type effect the next day.  Hopefully mask leakage improves with new supplies, as above.  She is going to discuss with her psychiatrist possible trying reduced dose of Seroquel to see if this helps with her morning fatigue.  Obesity: Body mass index is 34.76 kg/m².. Patients who are overweight or obese are at increased risk of sleep apnea/ sleep disordered breathing. Weight reduction and healthy lifestyle are encouraged in overweight/ obese patients as part of a comprehensive approach to sleep apnea treatment.    Nonobstructive CAD seen on 5/2023 heart cath  Mood disorder with secondary insomnia: Follows with psychiatrist  Hypertension  Prediabetes  Arthritis      Patient will follow-up in 4 to 12 weeks or follow-up sooner for any issues or concerns.  Patient's questions were answered.          Thank you for allowing me to participate in the care of this patient.     Yamila Rios DNP, APRKARLENE  [facility]

## 2024-04-08 ENCOUNTER — TELEPHONE (OUTPATIENT)
Dept: CARDIOLOGY | Facility: CLINIC | Age: 64
End: 2024-04-08
Payer: MEDICARE

## 2024-04-08 NOTE — TELEPHONE ENCOUNTER
COMPLAINING OF BODY SWEATS WITH LITTLE EXERTION. HEART HAS BEEN RACING WITH LITTLE ACTIVITY.  I MOVED HER APT TO THIS WEEK, 4/10.  JUST FYI.. SHE DOES NOT NEED CALL BACK.

## 2024-04-10 ENCOUNTER — OFFICE VISIT (OUTPATIENT)
Dept: CARDIOLOGY | Facility: CLINIC | Age: 64
End: 2024-04-10
Payer: MEDICARE

## 2024-04-10 VITALS
HEART RATE: 85 BPM | OXYGEN SATURATION: 91 % | WEIGHT: 191 LBS | DIASTOLIC BLOOD PRESSURE: 72 MMHG | HEIGHT: 60 IN | SYSTOLIC BLOOD PRESSURE: 137 MMHG | BODY MASS INDEX: 37.5 KG/M2

## 2024-04-10 DIAGNOSIS — R94.39 ABNORMAL NUCLEAR STRESS TEST: Primary | ICD-10-CM

## 2024-04-10 DIAGNOSIS — D17.1 LIPOMA OF TORSO: ICD-10-CM

## 2024-04-10 DIAGNOSIS — E78.2 MIXED HYPERLIPIDEMIA: ICD-10-CM

## 2024-04-10 DIAGNOSIS — G47.33 OSA ON CPAP: ICD-10-CM

## 2024-04-10 DIAGNOSIS — R94.6 ABNORMAL THYROID FUNCTION TEST: ICD-10-CM

## 2024-04-10 DIAGNOSIS — R06.09 DOE (DYSPNEA ON EXERTION): ICD-10-CM

## 2024-04-10 DIAGNOSIS — Z88.8 ALLERGY TO STATIN MEDICATION: ICD-10-CM

## 2024-04-10 DIAGNOSIS — E08.65 DIABETES MELLITUS DUE TO UNDERLYING CONDITION WITH HYPERGLYCEMIA, WITHOUT LONG-TERM CURRENT USE OF INSULIN: ICD-10-CM

## 2024-04-10 DIAGNOSIS — R07.9 CHEST PAIN, UNSPECIFIED TYPE: ICD-10-CM

## 2024-04-10 RX ORDER — MIRTAZAPINE 30 MG/1
30 TABLET, FILM COATED ORAL NIGHTLY
COMMUNITY
Start: 2024-04-04

## 2024-04-10 NOTE — PROGRESS NOTES
Encounter Date:04/10/2024        Patient ID: Gina Wright is a 64 y.o. female.      Chief Complaint:      History of Present Illness  64 years old woman who previously presented to the hospital with complaints of chest pain concerning for unstable angina.  Troponin was negative however nuclear stress test shows abnormality in the lateral wall.  A subsequent cardiac catheterization confirmed nonobstructive coronary disease.  She was started on medical management.  Today he comes in for follow-up with complaints of shortness of breath and palpitations.  She has been taking Leqvio for hyperlipidemia.  Her antidepressant medication is mirtazapine and she attributes her weight gain to that and Cymbalta.     The following portions of the patient's history were reviewed and updated as appropriate: allergies, current medications, past family history, past medical history, past social history, past surgical history, and problem list.    Review of Systems   Constitutional: Negative for malaise/fatigue.   Cardiovascular:  Positive for palpitations. Negative for chest pain, dyspnea on exertion and leg swelling.   Respiratory:  Positive for shortness of breath. Negative for cough.    Gastrointestinal:  Negative for abdominal pain, nausea and vomiting.   Neurological:  Negative for dizziness, focal weakness, headaches, light-headedness and numbness.   All other systems reviewed and are negative.        Current Outpatient Medications:     carvedilol (COREG) 6.25 MG tablet, Take 1 tablet by mouth 2 (Two) Times a Day., Disp: 180 tablet, Rfl: 3    DULoxetine (CYMBALTA) 30 MG capsule, Take 1 capsule by mouth Every Morning., Disp: , Rfl:     DULoxetine (CYMBALTA) 60 MG capsule, Take 1 capsule by mouth Daily. WITH 30 MG, Disp: , Rfl:     Inclisiran Sodium (LEQVIO SC), Inject  under the skin into the appropriate area as directed Every 6 (Six) Months., Disp: , Rfl:     mirtazapine (REMERON) 30 MG tablet, Take 1 tablet by mouth Every  Night., Disp: , Rfl:     pantoprazole (PROTONIX) 40 MG EC tablet, Take 1 tablet by mouth Daily., Disp: 30 tablet, Rfl: 0    Current outpatient and discharge medications have been reconciled for the patient.  Reviewed by: Jack Christensen MD       Allergies   Allergen Reactions    Ferumoxytol Anaphylaxis     Within 8 mins of infusion patient flushed including arms, diaphoretic, nauseous, chest and back pain.     Compazine [Prochlorperazine] Anxiety    Sulfa Antibiotics Other (See Comments)     Feels like I'm going to die.  Internal pain.    Atorvastatin Myalgia    Ciprofloxacin Rash     CAUSES AGITATION    Crestor [Rosuvastatin] Myalgia    Egg-Derived Products Nausea Only    Iron Other (See Comments)     Flu like symptoms    Prochlorperazine Edisylate Other (See Comments)     Jitters // Zofran    Trazodone Other (See Comments)     Unable to sleep       Family History   Problem Relation Age of Onset    Stroke Mother     Mental illness Father     Heart disease Father     Sleep apnea Neg Hx        Past Surgical History:   Procedure Laterality Date    BACK SURGERY      CARDIAC CATHETERIZATION Right 05/27/2023    Procedure: Left Heart Cath and coronary angiogram;  Surgeon: Jack Christensen MD;  Location: [facility] CATH INVASIVE LOCATION;  Service: Cardiovascular;  Laterality: Right;  5 Fr catheters    GALLBLADDER SURGERY      HYSTERECTOMY      MASS EXCISION Right 05/11/2022    Procedure: LIPOMA EXCISION from right shoulder;  Surgeon: Benjamin Munguia MD;  Location: [facility] MAIN OR;  Service: General;  Laterality: Right;    NASAL SEPTUM SURGERY      TONSILLECTOMY         Past Medical History:   Diagnosis Date    Anesthesia complication     pt states she needs extra d/t red hair    Arthritis     Depressive disorder     Enlarged heart     Hypothyroid     Migraine     MRSA infection     on hands    Obstructive sleep apnea     PONV (postoperative nausea and vomiting)        Family History   Problem Relation Age of Onset    Stroke Mother   "   Mental illness Father     Heart disease Father     Sleep apnea Neg Hx        Social History     Socioeconomic History    Marital status:     Number of children: 2    Years of education: 14   Tobacco Use    Smoking status: Never     Passive exposure: Past    Smokeless tobacco: Never   Vaping Use    Vaping status: Never Used   Substance and Sexual Activity    Alcohol use: Not Currently    Drug use: Not Currently    Sexual activity: Defer               Objective:       Physical Exam    /72 (BP Location: Left arm, Patient Position: Sitting, Cuff Size: Large Adult)   Pulse 85   Ht 152.4 cm (60\")   Wt 86.6 kg (191 lb)   SpO2 91%   BMI 37.30 kg/m²   The patient is alert, oriented and in no distress.    Vital signs as noted above.    Head and neck revealed no carotid bruits or jugular venous distension.  No thyromegaly or lymphadenopathy is present.    Lungs clear.  No wheezing.  Breath sounds are normal bilaterally.    Heart normal first and second heart sounds.  No murmur..  No pericardial rub is present.  No gallop is present.    Abdomen soft and nontender.  No organomegaly is present.    Extremities revealed good peripheral pulses without any pedal edema.    Skin warm and dry.    Musculoskeletal system is grossly normal.    CNS grossly normal.           Diagnosis Plan   1. Abnormal nuclear stress test        2. Lipoma of torso        3. Abnormal thyroid function test        4. Chest pain, unspecified type        5. Allergy to statin medication        6. BEBETO on CPAP        7. Mixed hyperlipidemia        LAB RESULTS (LAST 7 DAYS)    CBC        BMP        CMP         BNP        TROPONIN        CoAg        Creatinine Clearance  CrCl cannot be calculated (Patient's most recent lab result is older than the maximum 30 days allowed.).    ABG        Radiology  No radiology results for the last day    EKG    ECG 12 Lead    Date/Time: 4/10/2024 10:03 AM  Performed by: Jack Christensen MD    Authorized by: Fermin, " MD Jack  Comparison: compared with previous ECG   Similar to previous ECG  Comments: Sinus rhythm with Q waves in the anterior leads.  Heart rate 88, MT interval 159, QRS duration 97 and QTc 440 ms.          Stress test  Results for orders placed during the hospital encounter of 05/26/23    Stress Test With Myocardial Perfusion One Day    Interpretation Summary    Findings consistent with a normal ECG stress test.    Left ventricular ejection fraction is hyperdynamic (Calculated EF > 70%).    Myocardial perfusion imaging indicates a small-sized, moderately severe area of ischemia located in the lateral wall.    Impressions are consistent with an intermediate risk study.      Echocardiogram      Cardiac catheterization  Results for orders placed during the hospital encounter of 05/26/23    Cardiac Catheterization/Vascular Study    Conclusion  OPERATORS:  1. Jack Christensen M.D., Attending Cardiologist      PROCEDURE PERFORMED.  Ultrasound guided vascular access  Left heart catheterization  Coronary Angiogram [id number]  Moderate Sedation    INDICATIONS FOR PROCEDURE.  63 years old woman presented with chest pain suspicious for unstable angina.  She was noted to have an abnormal nuclear stress test.  After discussing the risk and benefit of the procedure she was brought to the Cath Lab for definitive coronary angiography.    PROCEDURE IN DETAIL.  Informed consent was obtained from the patient after explaining the risks, benefits, and alternative options of the procedure. After obtaining informed consent, the patient was brought to the cath lab and was prepped in a sterile fashion. Lidocaine 1% was used for local anesthesia into the right radial access site. The right radial artery was accessed under direct ultrasound visualization  with an angiocath needle via modified Seldinger technique. A 6F slender sheath was inserted successfully. Afterwards, 6F JR4  was advanced over a wire into the ascending aorta and used to cross  the AV and obtain LV pressures.  AV gradient obtained via pullback technique. JR4 and JL3.5 diagnostic catheters were used to engage the ostia of the RCA and LM respectively. Images of the right and left coronary systems were obtained. All the catheters were exchanged over a wire and subsequently removed. The patient tolerated the procedure well without any complications. The pictures were reviewed at the end of the procedure. TR band applied to right wrist for hemostasis and inflated with 15 cc of air. No complications were encountered.    HEMODYNAMICS.  LV: 102/6, 12 mmHg  AO: 103/72, 84 mmHg  No significant gradient across the aortic valve during pullback of JR4 catheter.  LV gram was not performed due to recent echocardiogram.    FINDINGS.    Coronary Angiogram.    Left dominant coronary system    1. Left main. Left main is a large-caliber vessel which gives rise to the Left Anterior Descending and the Left circumflex.  Left main is angiographically free from any significant disease    2. Left Anterior Descending Artery. LAD is a large vessel which gives rise to several septal perforators and several diagonal branches. It is angiographically free from any significant disease    3. Left Circumflex. The LCx is a dominant large caliber caliber which gives rise to marginals.  It also gives rise to the PDA.  Left circumflex artery is angiographically free from any significant disease    4. Right Coronary Artery. The RCA is a nondominant small vessel.    IMPRESSIONS.  1. Non-obstructive CAD  2.  Normal LVEDP    RECOMMENDATIONS.  1. Continue aggressive risk factor modification for primary prevention.  2. Exercise and lifestyle modifications recommended.          Assessment and Plan       Diagnoses and all orders for this visit:    1. Abnormal nuclear stress test (Primary)  Overview:  Added automatically from request for surgery [id number]      2. Lipoma of torso  Overview:  Added automatically from request for surgery  [id number]      3. Abnormal thyroid function test    4. Chest pain, unspecified type    5. Allergy to statin medication    6. BEBETO on CPAP    7. Mixed hyperlipidemia         Chest pain  64 years old woman presents with chest pain concerning for unstable angina.  ECG showed nonspecific ST-T wave changes in the lateral leads.  Troponin was negative but Nuclear stress test showed small sized moderately severe ischemia located in the lateral wall.  Cardiac cath showed nonobstructive coronary disease.  Normal LVEDP  False positive nuclear stress test due to left dominant coronary system and body habitus.  Consider noncardiac etiology for chest pain.  Reassurance provided to the patient     Shortness of breath  Unclear etiology  I will obtain an echocardiogram as patient has gained significant weight.  Evaluate for EF and diastolic function.  Also evaluate for valvular heart disease.    Hypertension  Continue Coreg.  Blood pressure and heart rate are well-controlled     Hyperlipidemia  , HDL 71, triglyceride 124, total cholesterol 234  Goal LDL less than 100.  She has not been able to tolerate statin even at low doses.  She is now on Leqvio  Repeat lipid panel     Prediabetes  A1c is 6  Diet, exercise, weight loss discussed with the patient  Also discussed the possibility of starting metformin.  Repeat A1c     Obesity  BMI is 37.3.  She weighs 191 pounds.  15 pounds weight gain since previous visit  Screening and treatment for sleep apnea  Diet, exercise, weight loss and lifestyle modifications discussed with the patient in details.    Anxiety/depression  Currently on Cymbalta and mirtazapine  Significant weight gain noted

## 2024-04-11 ENCOUNTER — OFFICE VISIT (OUTPATIENT)
Dept: SLEEP MEDICINE | Facility: CLINIC | Age: 64
End: 2024-04-11
Payer: MEDICARE

## 2024-04-11 VITALS
HEIGHT: 60 IN | BODY MASS INDEX: 37.5 KG/M2 | SYSTOLIC BLOOD PRESSURE: 124 MMHG | OXYGEN SATURATION: 93 % | DIASTOLIC BLOOD PRESSURE: 73 MMHG | WEIGHT: 191 LBS | HEART RATE: 88 BPM

## 2024-04-11 DIAGNOSIS — G47.00 INSOMNIA, UNSPECIFIED TYPE: ICD-10-CM

## 2024-04-11 DIAGNOSIS — E66.01 CLASS 2 SEVERE OBESITY WITH SERIOUS COMORBIDITY AND BODY MASS INDEX (BMI) OF 37.0 TO 37.9 IN ADULT, UNSPECIFIED OBESITY TYPE: ICD-10-CM

## 2024-04-11 DIAGNOSIS — G47.33 OBSTRUCTIVE SLEEP APNEA, ADULT: ICD-10-CM

## 2024-04-11 DIAGNOSIS — R51.9 MORNING HEADACHE: Primary | ICD-10-CM

## 2024-04-11 PROCEDURE — G0463 HOSPITAL OUTPT CLINIC VISIT: HCPCS

## 2024-04-11 NOTE — PROGRESS NOTES
[address] [address]  Phone   Fax         SLEEP CLINIC FOLLOW-UP PROGRESS NOTE    Gina Wright  [id number]   1960  64 y.o.  female      PCP: Benjamin Camargo CRNA    DATE OF VISIT: 4/11/2024          CHIEF COMPLAINT: Obstructive sleep apnea    HPI:  This is a 64 y.o. year old patient who presents to the clinic today for the management of obstructive sleep apnea. She had a sleep study 10/2022 showing AHI of 11.7/h. Lowest oxygen desaturation was 73%. She tried auto CPAP but could not get the pressures to a point she tolerated and also was not sleeping well in general at that time. She was seen in the office 7/2023 which point her insomnia was improved after working with psychiatrist. She underwent titration study 8/2023 and was titrated to CPAP at set pressure of 12 cm H2O, did not tolerate BiPAP.  She was seen back in the office 12/2023 at which point she had good usage of CPAP with good control of sleep apnea.  She now presents today for follow-up.  Continues to have good usage of Pap with residual AHI well-controlled at 1.7/h.  She wakes up tired and with a headache.  This seems to be worse on the nights that she gets too much sleep.  If she can get out of bed at a good time then she does not have as much issues with this.  She did get an alarm clock to help get up earlier but has trouble using this we discussed possibly using cell phone alarm or returning this for 1 that is easier to use.  He reports she gained weight with the Seroquel so her psychiatrist switched her to Remeron at night.          MEDICATIONS: reviewed     ALLERGIES:  Ferumoxytol, Compazine [prochlorperazine], Sulfa antibiotics, Atorvastatin, Ciprofloxacin, Crestor [rosuvastatin], Egg-derived products, Iron, Prochlorperazine edisylate, and Trazodone    SOCIAL HISTORY (habits pertaining to sleep medicine):  Tobacco use: No   Alcohol use: 0 per week  Caffeine  "use: 0     REVIEW OF SYSTEMS:   Pertinent positive symptoms are:  Mead Sleepiness Scale :Total score: 0   Abdominal bloating  Acid reflux  Headaches  Dyspnea  Nasal congestion        PHYSICAL EXAMINATION:  CONSTITUTIONAL:  Vitals:    04/11/24 1300   BP: 124/73   BP Location: Left arm   Patient Position: Sitting   Pulse: 88   SpO2: 93%   Weight: 86.6 kg (191 lb)   Height: 152.4 cm (60\")    Body mass index is 37.3 kg/m².   HEAD: atraumatic, normocephalic  RESP SYSTEM: not in respiratory distress, breathing unlabored  CARDIOVASULAR: normal rate, no edema noted   NEURO: Alert and oriented x 3, mood and affect appeared appropriate      DATA REVIEWED:  The PAP compliance summary downloaded on 4/2/2024 has been reviewed independently by me and discussed with the patient.   Compliance: 97%  More than 4 hr use: 97%  Average use of the device: 10 hours 34 minutes per night  Residual AHI: 1.7 /hr (goal < 5.0 /hr)  Device: ResMed AirSense 10  Mask type: Fullface  DME: [employer] [facility]          ASSESSMENT AND PLAN:  Obstructive Sleep Apnea: sleep apnea has improved with the device and treatment.  Patient has excellent compliance with the device for treatment of sleep apnea.  I have personally reviewed the smart card download and discussed the download data with the patient and encouarged continued use of the device.  The residual AHI is acceptable. The device is benefiting the patient and the device is medically necessary.  Recommend patient get supplies from the DME company, change them on a regular basis, and clean as directed.  A prescription for supplies has been sent to the [employer] [facility].  Also recommend using CPAP a minimum of 4 hours per night to meet insurance criteria, all night every night recommended for optimum health.  Recommend prioritizing sleep to aid in overall health.  Obesity: Body mass index is 37.3 kg/m².. Patients who are overweight or obese are at increased risk of sleep apnea/ sleep disordered " breathing. Weight reduction and healthy lifestyle are encouraged in overweight/ obese patients as part of a comprehensive approach to sleep apnea treatment.    Morning headache: Her O2 sats were within normal limits at current pressure on the in lab titration study but would like to check overnight oximetry study to ensure no significant low oxygen levels at home as she is on different sleep aid and has had some weight gain.  If overnight oximetry study within normal limits and she will discuss symptoms further with psychiatrist and PCP.  Morning fatigue: Worse on the nights that she gets too much sleep.  We discussed that most people need 7 to 9 hours of sleep per night.  She is going to set an alarm to try to get out of bed at had a good time as she does feel better if she does not sleep too long.  Nonobstructive CAD seen on 5/2023 heart cath  Mood disorder with secondary insomnia: Follows with psychiatrist  Insomnia: Healthy sleep habits discussed and handout provided.  She is on Remeron at night per psychiatrist.  She is aware not to stop medication or make any medication changes without talking to provider.  She is however also interested in possible alternate methods to help her sleep at night besides medications we did place order for referral to psychology for CBT-I.  Hypertension  Prediabetes  Arthritis      Patient will follow-up in 6 months or follow-up sooner for any issues or concerns.  Patient's questions were answered.          Thank you for allowing me to participate in the care of this patient.     Yamila Rios DNP, [facility]

## 2024-04-16 ENCOUNTER — TELEPHONE (OUTPATIENT)
Dept: SLEEP MEDICINE | Facility: CLINIC | Age: 64
End: 2024-04-16
Payer: MEDICARE

## 2024-04-16 DIAGNOSIS — G47.33 OBSTRUCTIVE SLEEP APNEA, ADULT: Primary | ICD-10-CM

## 2024-04-16 NOTE — TELEPHONE ENCOUNTER
Patient called.     She said that she had forgot to mention one of her main reasons for her most recent visit. She has been having cold night sweats for a couple of months now. She has tried wearing lighter clothing, less bed coverings and it hasn't helped.     Please advise.

## 2024-04-16 NOTE — TELEPHONE ENCOUNTER
Would have her discuss further with her primary care provider, cardiologist, and psychiatrist.  Not sure if it could be related to a medication or a different medical condition.    Would have her adjust her facemask to decrease mask leakage and then we can check overnight oximetry study on room air with her CPAP to ensure no significant low oxygen levels with using CPAP. Order placed during previous office visit, please send to [employer] [employer] and have her call them if they do not contact her within 1 week.

## 2024-04-25 NOTE — TELEPHONE ENCOUNTER
Patient returned call. I discussed with everything that Yamila had wrote. She understood and is currently waiting a new mask to try. I advised for her to reach out if she has any further questions or concerns.

## 2024-04-25 NOTE — TELEPHONE ENCOUNTER
Please let patient know that her overnight oximetry study did not indicate any significant low oxygen levels while sleeping.    Her recent device download showed sleep apnea appears well treated.  Would recommend continuing to use CPAP all night every night.  She was having some mask leakage on last download, would have her adjust mask to see if she can get better seal and reach out to [facility] for mask fitting if continues to have issues with leakage.    Would proceed with CBT-I to help with her insomnia as previously recommended.    Would have her discuss her symptoms with her primary care provider, psychiatrist, and cardiologist to ensure that they do not think that symptoms are blood sugar related, medication related, or possibly secondary to another medical condition.    Please also fax a copy of overnight oximetry study to cardiologist for review of patient's nocturnal heart rates.

## 2024-05-03 ENCOUNTER — HOSPITAL ENCOUNTER (OUTPATIENT)
Dept: CARDIOLOGY | Facility: HOSPITAL | Age: 64
Discharge: HOME | End: 2024-05-03
Payer: MEDICARE

## 2024-05-03 VITALS
HEIGHT: 60 IN | SYSTOLIC BLOOD PRESSURE: 137 MMHG | WEIGHT: 191 LBS | BODY MASS INDEX: 37.5 KG/M2 | DIASTOLIC BLOOD PRESSURE: 72 MMHG

## 2024-05-03 LAB
BH CV ECHO MEAS - ACS: 1.82 CM
BH CV ECHO MEAS - AO MAX PG: 8.1 MMHG
BH CV ECHO MEAS - AO MEAN PG: 4.4 MMHG
BH CV ECHO MEAS - AO ROOT DIAM: 2.9 CM
BH CV ECHO MEAS - AO V2 MAX: 142 CM/SEC
BH CV ECHO MEAS - AO V2 VTI: 28 CM
BH CV ECHO MEAS - AVA(I,D): 2.01 CM2
BH CV ECHO MEAS - EDV(CUBED): 72 ML
BH CV ECHO MEAS - EDV(MOD-SP4): 64.4 ML
BH CV ECHO MEAS - EF(MOD-BP): 56 %
BH CV ECHO MEAS - EF(MOD-SP4): 56.2 %
BH CV ECHO MEAS - ESV(CUBED): 21.4 ML
BH CV ECHO MEAS - ESV(MOD-SP4): 28.2 ML
BH CV ECHO MEAS - FS: 33.2 %
BH CV ECHO MEAS - IVS/LVPW: 0.99 CM
BH CV ECHO MEAS - IVSD: 1.03 CM
BH CV ECHO MEAS - LA DIMENSION: 2.9 CM
BH CV ECHO MEAS - LV MASS(C)D: 141.5 GRAMS
BH CV ECHO MEAS - LV MAX PG: 5.1 MMHG
BH CV ECHO MEAS - LV MEAN PG: 2.6 MMHG
BH CV ECHO MEAS - LV V1 MAX: 113 CM/SEC
BH CV ECHO MEAS - LV V1 VTI: 23.2 CM
BH CV ECHO MEAS - LVIDD: 4.2 CM
BH CV ECHO MEAS - LVIDS: 2.8 CM
BH CV ECHO MEAS - LVOT AREA: 2.42 CM2
BH CV ECHO MEAS - LVOT DIAM: 1.76 CM
BH CV ECHO MEAS - LVPWD: 1.04 CM
BH CV ECHO MEAS - MV A MAX VEL: 92.2 CM/SEC
BH CV ECHO MEAS - MV DEC SLOPE: 627.6 CM/SEC2
BH CV ECHO MEAS - MV DEC TIME: 0.13 SEC
BH CV ECHO MEAS - MV E MAX VEL: 80.9 CM/SEC
BH CV ECHO MEAS - MV E/A: 0.88
BH CV ECHO MEAS - MV MAX PG: 5.4 MMHG
BH CV ECHO MEAS - MV MEAN PG: 2.9 MMHG
BH CV ECHO MEAS - MV V2 VTI: 19.7 CM
BH CV ECHO MEAS - MVA(VTI): 2.9 CM2
BH CV ECHO MEAS - PA ACC TIME: 0.09 SEC
BH CV ECHO MEAS - PA V2 MAX: 119.6 CM/SEC
BH CV ECHO MEAS - RAP SYSTOLE: 3 MMHG
BH CV ECHO MEAS - RV MAX PG: 3.2 MMHG
BH CV ECHO MEAS - RV V1 MAX: 89.4 CM/SEC
BH CV ECHO MEAS - RV V1 VTI: 19.2 CM
BH CV ECHO MEAS - RVDD: 2.9 CM
BH CV ECHO MEAS - RVSP: 21.5 MMHG
BH CV ECHO MEAS - SV(LVOT): 56.4 ML
BH CV ECHO MEAS - SV(MOD-SP4): 36.2 ML
BH CV ECHO MEAS - TR MAX PG: 18.5 MMHG
BH CV ECHO MEAS - TR MAX VEL: 214.9 CM/SEC

## 2024-05-03 PROCEDURE — 93306 TTE W/DOPPLER COMPLETE: CPT

## 2024-05-03 PROCEDURE — 93356 MYOCRD STRAIN IMG SPCKL TRCK: CPT

## 2024-05-08 ENCOUNTER — TELEPHONE (OUTPATIENT)
Dept: CARDIOLOGY | Facility: CLINIC | Age: 64
End: 2024-05-08
Payer: MEDICARE

## 2024-05-13 ENCOUNTER — TELEPHONE (OUTPATIENT)
Dept: CARDIOLOGY | Facility: CLINIC | Age: 64
End: 2024-05-13
Payer: MEDICARE

## 2024-05-15 ENCOUNTER — TELEPHONE (OUTPATIENT)
Dept: CARDIOLOGY | Facility: CLINIC | Age: 64
End: 2024-05-15
Payer: MEDICARE

## 2024-05-15 NOTE — TELEPHONE ENCOUNTER
Caller: Gina Wright    Relationship: Self    Best call back number:[phone]    What is the best time to reach you: ANY    Who are you requesting to speak with (clinical staff, provider,  specific staff member): ANY        What was the call regarding: PT THINKS SHE MISSED A CALL ABOUT HER ECHO RESULTS, PLEASE GIVE STATUS UP DATE OR RESULTS TO THE PATIENT

## 2024-05-29 ENCOUNTER — LAB (OUTPATIENT)
Dept: LAB | Facility: HOSPITAL | Age: 64
End: 2024-05-29
Payer: MEDICARE

## 2024-05-29 DIAGNOSIS — E08.65 DIABETES MELLITUS DUE TO UNDERLYING CONDITION WITH HYPERGLYCEMIA, WITHOUT LONG-TERM CURRENT USE OF INSULIN: ICD-10-CM

## 2024-05-29 DIAGNOSIS — E78.2 MIXED HYPERLIPIDEMIA: ICD-10-CM

## 2024-05-29 LAB
CHOLEST SERPL-MCNC: 165 MG/DL (ref 0–200)
HBA1C MFR BLD: 6.1 % (ref 4.8–5.6)
HDLC SERPL-MCNC: 71 MG/DL (ref 40–60)
LDLC SERPL CALC-MCNC: 69 MG/DL (ref 0–100)
LDLC/HDLC SERPL: 0.9 {RATIO}
TRIGL SERPL-MCNC: 149 MG/DL (ref 0–150)
VLDLC SERPL-MCNC: 25 MG/DL (ref 5–40)

## 2024-05-29 PROCEDURE — 36415 COLL VENOUS BLD VENIPUNCTURE: CPT

## 2024-05-29 PROCEDURE — 83036 HEMOGLOBIN GLYCOSYLATED A1C: CPT

## 2024-05-29 PROCEDURE — 80061 LIPID PANEL: CPT

## 2024-05-30 ENCOUNTER — OFFICE VISIT (OUTPATIENT)
Dept: PODIATRY | Facility: CLINIC | Age: 64
End: 2024-05-30
Payer: MEDICARE

## 2024-05-30 VITALS — RESPIRATION RATE: 20 BRPM | BODY MASS INDEX: 37.5 KG/M2 | WEIGHT: 191 LBS | HEIGHT: 60 IN

## 2024-05-30 DIAGNOSIS — M21.961 ACQUIRED FOOT DEFORMITY, RIGHT: ICD-10-CM

## 2024-05-30 DIAGNOSIS — M20.41 HAMMER TOE OF RIGHT FOOT: ICD-10-CM

## 2024-05-30 DIAGNOSIS — M77.41 METATARSALGIA, RIGHT FOOT: ICD-10-CM

## 2024-05-30 DIAGNOSIS — M79.671 RIGHT FOOT PAIN: Primary | ICD-10-CM

## 2024-05-30 DIAGNOSIS — M20.21 HALLUX RIGIDUS, RIGHT FOOT: ICD-10-CM

## 2024-05-30 RX ORDER — MELOXICAM 15 MG/1
15 TABLET ORAL DAILY
COMMUNITY

## 2024-05-30 NOTE — PROGRESS NOTES
05/30/2024  Foot and Ankle Surgery - New Patient   Provider: Dr. Ciaran Sim DPM  Location: [facility]    Subjective:  Gina Wright is a 64 y.o. female.     Chief Complaint   Patient presents with    Right Foot - Hammer Toe, Initial Evaluation    Initial Evaluation     KASHIF Cross md  5/23/2024       HPI: The patient is a 64-year-old female who presents for evaluation of toe deformities.    The patient, referred by her daughter's boyfriend, has been experiencing toe deformities for the past 2 years. Despite not having previously sought medical attention for her foot, she initially suspected these symptoms to be hip-related. An x-ray examination conducted recently revealed that her symptoms were not originating from her hip, but rather from her foot. She denies any prior foot injury, but recalls undergoing a bunionectomy at the age of 64-year-old [place] 64-[place] in [place]. She experiences nocturnal foot pain, which impedes her ability to ambulate around the block. Her daily routine includes significant walking. She has a history of back surgery. Currently, she is not driving and resides in an assisted living facility for assistance with household chores and meals.    Supplemental Information  She has sleep apnea. 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 However, it has not bothered her with the surgeries. She does need to take more anesthetics to stay asleep. She is prediabetic and has been fighting it for 12 years.    Allergies   Allergen Reactions    Ferumoxytol Anaphylaxis     Within 8 mins of infusion patient flushed including arms, diaphoretic, nauseous, chest and back pain.     Compazine [Prochlorperazine] Anxiety    Sulfa Antibiotics Other (See Comments)     Feels like I'm going to die.  Internal pain.    Atorvastatin Myalgia    Ciprofloxacin Rash     CAUSES AGITATION    Crestor  [Rosuvastatin] Myalgia    Egg-Derived Products Nausea Only    Iron Other (See Comments)     Flu like symptoms    Prochlorperazine Edisylate Other (See Comments)     Jitters // Zofran    Trazodone Other (See Comments)     Unable to sleep       Past Medical History:   Diagnosis Date    Anesthesia complication     pt states she needs extra d/t red hair    Arthritis     Depressive disorder     Enlarged heart     Hypothyroid     Migraine     MRSA infection     on hands    Obstructive sleep apnea     PONV (postoperative nausea and vomiting)        Past Surgical History:   Procedure Laterality Date    BACK SURGERY      CARDIAC CATHETERIZATION Right 05/27/2023    Procedure: Left Heart Cath and coronary angiogram;  Surgeon: Jack Christensen MD;  Location: [facility] CATH INVASIVE LOCATION;  Service: Cardiovascular;  Laterality: Right;  5 Fr catheters    GALLBLADDER SURGERY      HYSTERECTOMY      MASS EXCISION Right 05/11/2022    Procedure: LIPOMA EXCISION from right shoulder;  Surgeon: Benjamin Munguia MD;  Location: [facility] MAIN OR;  Service: General;  Laterality: Right;    NASAL SEPTUM SURGERY      TONSILLECTOMY         Family History   Problem Relation Age of Onset    Stroke Mother     Mental illness Father     Heart disease Father     Sleep apnea Neg Hx        Social History     Socioeconomic History    Marital status:     Number of children: 2    Years of education: 14   Tobacco Use    Smoking status: Never     Passive exposure: Past    Smokeless tobacco: Never   Vaping Use    Vaping status: Never Used   Substance and Sexual Activity    Alcohol use: Not Currently    Drug use: Not Currently    Sexual activity: Defer        Current Outpatient Medications on File Prior to Visit   Medication Sig Dispense Refill    carvedilol (COREG) 6.25 MG tablet Take 1 tablet by mouth 2 (Two) Times a Day. 180 tablet 3    DULoxetine (CYMBALTA) 30 MG capsule Take 1 capsule by mouth Every Morning.      Inclisiran Sodium (LEQVIO SC) Inject   "under the skin into the appropriate area as directed Every 6 (Six) Months.      meloxicam (MOBIC) 15 MG tablet Take 1 tablet by mouth Daily.      metFORMIN (GLUCOPHAGE) 500 MG tablet Take 1 tablet by mouth 2 (Two) Times a Day With Meals.      mirtazapine (REMERON) 30 MG tablet Take 1 tablet by mouth Every Night.      pantoprazole (PROTONIX) 40 MG EC tablet Take 1 tablet by mouth Daily. 30 tablet 0     No current facility-administered medications on file prior to visit.       Review of Systems:  General: Denies fever, chills, fatigue, and weakness.  Eyes: Denies vision loss, blurry vision, and excessive redness.  ENT: Denies hearing issues and difficulty swallowing.  Cardiovascular: Denies palpitations, chest pain, or syncopal episodes.  Respiratory: Denies shortness of breath, wheezing, and coughing.  GI: Denies abdominal pain, nausea, and vomiting.   : Denies frequency, hematuria, and urgency.  Musculoskeletal: Denies muscle cramps, joint pains, and stiffness.  Derm: Denies rash, open wounds, or suspicious lesions.  Neuro: Denies headaches, numbness, loss of coordination, and tremors.  Psych: Denies anxiety and depression.  Endocrine: Denies temperature intolerance and changes in appetite.  Heme: Denies bleeding disorders or abnormal bruising.     Objective   Resp 20   Ht 152.4 cm (60\")   Wt 86.6 kg (191 lb)   BMI 37.30 kg/m²     Foot/Ankle Exam     Right foot additional comments: Significant rigidity is noted in the first metatarsophalangeal joint of the right foot. Overlapping second digit and an adduction deformity involving the third toe are observed. Prominent discomfort is felt upon palpation of the forefoot. Moderate to significant soft tissue rigidity and equinus contracture are observed. Muscle strength and instability testing are appropriate.      Assessment & Plan   Diagnoses and all orders for this visit:    1. Right foot pain (Primary)  -     XR Foot 3+ View Right    2. Hallux rigidus, right " foot  -     Case Request; Standing  -     CBC (No Diff); Future  -     Basic Metabolic Panel; Future  -     XR Chest 2 View; Future  -     ECG 12 Lead; Future  -     ceFAZolin (ANCEF) 2,000 mg in sodium chloride 0.9 % 100 mL IVPB  -     Case Request    3. Hammer toe of right foot  -     Case Request; Standing  -     CBC (No Diff); Future  -     Basic Metabolic Panel; Future  -     XR Chest 2 View; Future  -     ECG 12 Lead; Future  -     ceFAZolin (ANCEF) 2,000 mg in sodium chloride 0.9 % 100 mL IVPB  -     Case Request    4. Metatarsalgia, right foot    5. Acquired foot deformity, right  -     Case Request; Standing  -     CBC (No Diff); Future  -     Basic Metabolic Panel; Future  -     XR Chest 2 View; Future  -     ECG 12 Lead; Future  -     ceFAZolin (ANCEF) 2,000 mg in sodium chloride 0.9 % 100 mL IVPB  -     Case Request    Other orders  -     Follow Anesthesia Guidelines / Protocol; Future  -     Follow Anesthesia Guidelines / Protocol; Standing  -     Verify / Perform Chlorhexidine Skin Prep; Standing  -     Obtain Informed Consent; Future  -     Provide NPO Instructions to Patient; Future  -     Chlorhexidine Skin Prep; Future  -     Place Sequential Compression Device; Standing  -     Maintain Sequential Compression Device; Standing      Patient is a 64-year-old female that presents with issues involving her right foot.  Her complaints are secondary to pain that she is having to her great toe joint as well as overriding second digit.  She states that these issues have been gradually progressive over the last few years.  She is tired of dealing with pain and limitation would like to discuss treatment options.  I reviewed the imaging and diagnoses.  We reviewed the biomechanics and etiology of her symptoms.  Patient has tried inserts, spacers, and shoe changes without any significant improvement.  I do feel that she would require operative intervention definitive management.  We discussed first metatarsal  phalangeal joint arthrodesis, Weil osteotomy of the second, third, and fourth metatarsals, and hammer digit correction of the lesser digits.  I reviewed the procedures, risks, goals, and recovery with her at length.  She understands that she would require nonweightbearing and decreased overall activity after surgery.  We did discuss the risk of recurrence of digital deformities.  All questions were answered to patient's satisfaction.  She would like to proceed with surgery in the near future.  Greater than 45 minutes was spent before, during, and after evaluation for patient care.    Orders Placed This Encounter   Procedures    XR Foot 3+ View Right     Order Specific Question:   Reason for Exam:     Answer:   hammer toe room 15  wb     Order Specific Question:   Does this patient have a diabetic monitoring/medication delivering device on?     Answer:   No     Order Specific Question:   Release to patient     Answer:   Routine Release [[id number]]    XR Chest 2 View     Standing Status:   Future     Standing Expiration Date:   5/31/2025     Order Specific Question:   Reason for Exam:     Answer:   Preop     Order Specific Question:   Release to patient     Answer:   Routine Release [[id number]]    CBC (No Diff)     Standing Status:   Future     Standing Expiration Date:   5/31/2025     Order Specific Question:   Release to patient     Answer:   Routine Release [[id number]]    Basic Metabolic Panel     Standing Status:   Future     Standing Expiration Date:   5/31/2025     Order Specific Question:   Release to patient     Answer:   Routine Release [[id number]]    Obtain Informed Consent     Standing Status:   Future     Order Specific Question:   Informed Consent Given For     Answer:   First metatarsal phalangeal joint arthrodesis with Weil osteotomy to the second, third, and fourth metatarsals with hammer digit corrections to the lesser digits all to the right foot    Provide NPO Instructions to Patient      Standing Status:   Future    Chlorhexidine Skin Prep     Chlorhexidine Skin Prep and Instructions For All Patients Having A Procedure Requiring an Outward Incision if Not Allergic. If Allergic, Give Antibacterial Skin Wipes and Instructions. Do Not Use For Facial Cases or on Any Mucus Membranes.     Standing Status:   Future    ECG 12 Lead     Standing Status:   Future     Standing Expiration Date:   5/31/2025     Order Specific Question:   Reason for Exam:     Answer:   Preop     Order Specific Question:   Release to patient     Answer:   Routine Release [[id number]]        Note is dictated utilizing voice recognition software. Unfortunately this leads to occasional typographical errors. I apologize in advance if the situation occurs. If questions occur please do not hesitate to call our office.    Transcribed from ambient dictation for GIGI Sim DPM by Fauzia Lowe.  05/30/24   14:48 EDT    Patient or patient representative verbalized consent to the visit recording.  I have personally performed the services described in this document as transcribed by the above individual, and it is both accurate and complete.

## 2024-06-03 ENCOUNTER — TELEPHONE (OUTPATIENT)
Dept: ORTHOPEDIC SURGERY | Facility: CLINIC | Age: 64
End: 2024-06-03
Payer: MEDICARE

## 2024-06-03 NOTE — TELEPHONE ENCOUNTER
PATIENT CALLED IN AND HAS MORE QUESTIONS REGARDING THE SURGERY THAT HER AND DR JASSO DISCUSSED. PATIENT STATED SHE IS HAVING SIMILAR ISSUES WITH LEFT FOOT. WOULD MAKING THE TOES STRAIGHT AND REMOVING 2ND TOE HELP WITH HER HIP? SHE IS WORRIED ABOUT MOBILITY ISSUES AND WOULD LIKE SOME CLARIFICATION. PLEASE CALL TO DISCUSS.

## 2024-06-06 ENCOUNTER — TELEPHONE (OUTPATIENT)
Dept: PODIATRY | Facility: CLINIC | Age: 64
End: 2024-06-06
Payer: MEDICARE

## 2024-06-06 DIAGNOSIS — M21.961 ACQUIRED FOOT DEFORMITY, RIGHT: ICD-10-CM

## 2024-06-06 DIAGNOSIS — M20.21 HALLUX RIGIDUS, RIGHT FOOT: Primary | ICD-10-CM

## 2024-06-06 DIAGNOSIS — M79.671 RIGHT FOOT PAIN: ICD-10-CM

## 2024-06-06 DIAGNOSIS — M20.41 HAMMER TOE OF RIGHT FOOT: ICD-10-CM

## 2024-06-06 DIAGNOSIS — M77.41 METATARSALGIA, RIGHT FOOT: ICD-10-CM

## 2024-06-06 NOTE — TELEPHONE ENCOUNTER
PATIENT IS SCHEDULED FOR SURGERY ON 6/21/2024 [facility]TPJ FUSION & 4TH WEIL OSTEOTOMY WITH HAMMERTOE CORRECTION WITH DR. JASSO. PATIENT WOULD LIKE AN ORDER FOR A WHEELCHAIR TO USE AFTER SURGERY IF YOU CAN PLEASE CALL THE PATIENT TO DISCUSS.

## 2024-06-11 ENCOUNTER — TELEPHONE (OUTPATIENT)
Dept: ORTHOPEDIC SURGERY | Facility: CLINIC | Age: 64
End: 2024-06-11
Payer: MEDICARE

## 2024-06-11 ENCOUNTER — HOSPITAL ENCOUNTER (OUTPATIENT)
Dept: ONCOLOGY | Facility: HOSPITAL | Age: 64
Discharge: HOME | End: 2024-06-11
Admitting: NURSE PRACTITIONER
Payer: MEDICARE

## 2024-06-11 DIAGNOSIS — E78.2 MIXED HYPERLIPIDEMIA: ICD-10-CM

## 2024-06-11 DIAGNOSIS — Z88.8 ALLERGY TO STATIN MEDICATION: Primary | ICD-10-CM

## 2024-06-11 PROCEDURE — 96372 THER/PROPH/DIAG INJ SC/IM: CPT

## 2024-06-11 PROCEDURE — 25010000002 INCLISIRAN SODIUM 284 MG/1.5ML SOLUTION PREFILLED SYRINGE: Performed by: NURSE PRACTITIONER

## 2024-06-11 RX ORDER — FAMOTIDINE 10 MG/ML
20 INJECTION, SOLUTION INTRAVENOUS AS NEEDED
OUTPATIENT
Start: 2024-06-11

## 2024-06-11 RX ORDER — DIPHENHYDRAMINE HYDROCHLORIDE 50 MG/ML
50 INJECTION INTRAMUSCULAR; INTRAVENOUS AS NEEDED
OUTPATIENT
Start: 2024-06-11

## 2024-06-11 RX ADMIN — INCLISIRAN 284 MG: 284 INJECTION, SOLUTION SUBCUTANEOUS at 13:12

## 2024-06-11 NOTE — TELEPHONE ENCOUNTER
Provider: DR JASSO     Caller: PATIENT     Phone Number: [phone]    Reason for Call: PATIENT WOULD LIKE KNOW HOW TO GO ABOUT GETTING A WHEELCHAIR FOR POST OP CARE (SURGERY 6-21-24) AND WOULD SHE NEED TO BRING THAT TO THE HOSPITAL DAY OF SURGERY. PLEASE ADVISE.

## 2024-06-12 NOTE — TELEPHONE ENCOUNTER
I spoke with [employer] and they will delivery to patient. I have faxed order and records and called patient

## 2024-06-12 NOTE — TELEPHONE ENCOUNTER
I spoke with patient to let her know I have wheelchair order ready for her. She is calling her insurance company to if they cover wheel chair and where she can go for this DME.. She will me back

## 2024-06-12 NOTE — TELEPHONE ENCOUNTER
[facility] P: [phone]    [facility] P:[phone]    [facility] P: [phone]     Patient only got phone number not any fax numbers for the locations.      Per patient the [facility] told patient that approval can take 14 days, but if we place as urgent that can be completed within 72 hrs. Patient is ok with using any location for wheelchair DME order.

## 2024-06-17 ENCOUNTER — HOSPITAL ENCOUNTER (OUTPATIENT)
Dept: CARDIOLOGY | Facility: HOSPITAL | Age: 64
Discharge: HOME | End: 2024-06-17
Payer: MEDICARE

## 2024-06-17 ENCOUNTER — LAB (OUTPATIENT)
Dept: LAB | Facility: HOSPITAL | Age: 64
End: 2024-06-17
Payer: MEDICARE

## 2024-06-17 ENCOUNTER — HOSPITAL ENCOUNTER (OUTPATIENT)
Dept: GENERAL RADIOLOGY | Facility: HOSPITAL | Age: 64
Discharge: HOME | End: 2024-06-17
Payer: MEDICARE

## 2024-06-17 DIAGNOSIS — M20.41 HAMMER TOE OF RIGHT FOOT: ICD-10-CM

## 2024-06-17 DIAGNOSIS — M21.961 ACQUIRED FOOT DEFORMITY, RIGHT: ICD-10-CM

## 2024-06-17 DIAGNOSIS — M20.21 HALLUX RIGIDUS, RIGHT FOOT: ICD-10-CM

## 2024-06-17 LAB
ANION GAP SERPL CALCULATED.3IONS-SCNC: 13 MMOL/L (ref 5–15)
BUN SERPL-MCNC: 11 MG/DL (ref 8–23)
BUN/CREAT SERPL: 12.6 (ref 7–25)
CALCIUM SPEC-SCNC: 9.7 MG/DL (ref 8.6–10.5)
CHLORIDE SERPL-SCNC: 107 MMOL/L (ref 98–107)
CO2 SERPL-SCNC: 22 MMOL/L (ref 22–29)
CREAT SERPL-MCNC: 0.87 MG/DL (ref 0.57–1)
DEPRECATED RDW RBC AUTO: 43.7 FL (ref 37–54)
EGFRCR SERPLBLD CKD-EPI 2021: 75.4 ML/MIN/1.73
ERYTHROCYTE [DISTWIDTH] IN BLOOD BY AUTOMATED COUNT: 14.4 % (ref 12.3–15.4)
GLUCOSE SERPL-MCNC: 142 MG/DL (ref 65–99)
HCT VFR BLD AUTO: 36.3 % (ref 34–46.6)
HGB BLD-MCNC: 12 G/DL (ref 12–15.9)
MCH RBC QN AUTO: 28.2 PG (ref 26.6–33)
MCHC RBC AUTO-ENTMCNC: 33.1 G/DL (ref 31.5–35.7)
MCV RBC AUTO: 85.4 FL (ref 79–97)
PLATELET # BLD AUTO: 362 10*3/MM3 (ref 140–450)
PMV BLD AUTO: 10.7 FL (ref 6–12)
POTASSIUM SERPL-SCNC: 4.1 MMOL/L (ref 3.5–5.2)
RBC # BLD AUTO: 4.25 10*6/MM3 (ref 3.77–5.28)
SODIUM SERPL-SCNC: 142 MMOL/L (ref 136–145)
WBC NRBC COR # BLD AUTO: 6.18 10*3/MM3 (ref 3.4–10.8)

## 2024-06-17 PROCEDURE — 80048 BASIC METABOLIC PNL TOTAL CA: CPT

## 2024-06-17 PROCEDURE — 93005 ELECTROCARDIOGRAM TRACING: CPT | Performed by: PODIATRIST

## 2024-06-17 PROCEDURE — 36415 COLL VENOUS BLD VENIPUNCTURE: CPT

## 2024-06-17 PROCEDURE — 71046 X-RAY EXAM CHEST 2 VIEWS: CPT

## 2024-06-17 PROCEDURE — 85027 COMPLETE CBC AUTOMATED: CPT

## 2024-06-18 ENCOUNTER — TELEPHONE (OUTPATIENT)
Dept: ORTHOPEDIC SURGERY | Facility: CLINIC | Age: 64
End: 2024-06-18

## 2024-06-18 NOTE — TELEPHONE ENCOUNTER
CALLED PATIENT TO SEE WHAT QUESTIONS SHE HAD.  1)PATIENT IS ALLERGIC TO HYDROCODONE IT MAKES HER ITCHY, WOULD LIKE SOME OTHER PAIN MEDICATION CALLED IN AFTER SURGERY  2) PATIENT WOULD LIKE HER MEDS CALLED IN TO [facility] ON DAY FOR SURGERY SO SHE CAN  AFTER DISCHARGE  3) PATIENT WOULD LIKE TO KNOW HOW LONG SHE WILL BE NON WEIGHT BEARING AFTER SURGERY?    DR. JASSO CAN YOU PLEASE ADVISE ON THE NON WEIGHT BEARING THE REST IS FYI    THANK YOU

## 2024-06-18 NOTE — TELEPHONE ENCOUNTER
Provider: DR JASSO     Caller: PATIENT     Phone Number: [phone]    Reason for Call: PATIENT HAS SURGERY ON 6-21-24 AND HAS SOME QUESTIONS REGARDING AFTER CARE. PLEASE CALL TO DISCUSS AND OKAY TO LEAVE A VOICEMAIL. PATIENT STATES SHE DID HER PRE OP YESTERDAY.

## 2024-06-19 ENCOUNTER — TELEPHONE (OUTPATIENT)
Dept: CARDIOLOGY | Facility: CLINIC | Age: 64
End: 2024-06-19
Payer: MEDICARE

## 2024-06-19 LAB
QT INTERVAL: 359 MS
QTC INTERVAL: 454 MS

## 2024-06-19 NOTE — TELEPHONE ENCOUNTER
Provider: ROMERO    Caller: PATIENT    Phone Number: [phone]    Reason for Call: PATIENT STATES SHE WAS TOLD THAT HER SURGERY IS OUTPATIENT. SHE STATES THAT SHE WILL NOT BE ABLE TO GO HOME NON-WEIGHT BEARING BECAUSE SHE HAS NO ONE TO ASSIST HER AT HOME. SHE STATES SHE WOULD NEED TO GO INTO A REHAB FACILITY AFTER SURGERY. SHE STATES THAT  [facility] TOLD HER THEY WOULD NEED A FEW DAYS TO COMPLETE PAPERWORK TO GET HER INTO A REHAB FACILITY. SHE IS ASKING IF DR. JASSO WOULD BE ABLE TO ADMIT HER TO THE HOSPITAL OVER THE WEEKEND UNTIL THE HOSPITAL CAN GET HER INTO A FACILITY.

## 2024-06-20 NOTE — TELEPHONE ENCOUNTER
PATIENT CALLED BACK AND WANTS TO CANCEL SURGERY DUE TO NOT HAVE AFTER CARE SETUP. PATIENT WILL CALL BACK IF SHE DECIDES TO RESCHEDULE IN THE FUTURE.

## 2024-09-05 DIAGNOSIS — I10 PRIMARY HYPERTENSION: ICD-10-CM

## 2024-09-05 RX ORDER — CARVEDILOL 6.25 MG/1
6.25 TABLET ORAL 2 TIMES DAILY
Qty: 180 TABLET | Refills: 1 | Status: SHIPPED | OUTPATIENT
Start: 2024-09-05

## 2024-09-16 ENCOUNTER — OFFICE VISIT (OUTPATIENT)
Dept: SLEEP MEDICINE | Facility: CLINIC | Age: 64
End: 2024-09-16
Payer: MEDICARE

## 2024-09-16 VITALS
BODY MASS INDEX: 33.38 KG/M2 | SYSTOLIC BLOOD PRESSURE: 110 MMHG | DIASTOLIC BLOOD PRESSURE: 71 MMHG | WEIGHT: 170 LBS | OXYGEN SATURATION: 95 % | HEART RATE: 91 BPM | HEIGHT: 60 IN

## 2024-09-16 DIAGNOSIS — G47.33 OBSTRUCTIVE SLEEP APNEA, ADULT: Primary | ICD-10-CM

## 2024-09-16 DIAGNOSIS — Z78.9 INTOLERANCE OF CONTINUOUS POSITIVE AIRWAY PRESSURE (CPAP) VENTILATION: ICD-10-CM

## 2024-09-16 DIAGNOSIS — G47.8 NON-RESTORATIVE SLEEP: ICD-10-CM

## 2024-09-16 DIAGNOSIS — E66.9 OBESITY (BMI 30-39.9): ICD-10-CM

## 2024-09-16 DIAGNOSIS — G47.10 HYPERSOMNIA: ICD-10-CM

## 2024-09-16 PROCEDURE — 1160F RVW MEDS BY RX/DR IN RCRD: CPT | Performed by: FAMILY MEDICINE

## 2024-09-16 PROCEDURE — 99213 OFFICE O/P EST LOW 20 MIN: CPT | Performed by: FAMILY MEDICINE

## 2024-09-16 PROCEDURE — G0463 HOSPITAL OUTPT CLINIC VISIT: HCPCS

## 2024-09-16 PROCEDURE — 1159F MED LIST DOCD IN RCRD: CPT | Performed by: FAMILY MEDICINE

## 2024-09-16 RX ORDER — SEMAGLUTIDE 1 MG/.5ML
1 INJECTION, SOLUTION SUBCUTANEOUS
COMMUNITY

## 2024-10-05 NOTE — PROGRESS NOTES
Encounter Date:10/11/2024        Patient ID: Gina Wright is a 64 y.o. female.      Chief Complaint:      History of Present Illness  64 years old woman who previously presented to the hospital with complaints of chest pain concerning for unstable angina.  Troponin was negative however nuclear stress test shows abnormality in the lateral wall.  A subsequent cardiac catheterization confirmed nonobstructive coronary disease.  She was started on medical management.  Today he comes in for follow-up with complaints of shortness of breath and palpitations.  She has been taking Leqvio for hyperlipidemia.      Current cardiac medications include Coreg, Leqvio.    She complains of palpitations and sweating    The following portions of the patient's history were reviewed and updated as appropriate: allergies, current medications, past family history, past medical history, past social history, past surgical history, and problem list.    Review of Systems   Constitutional: Positive for malaise/fatigue.   Cardiovascular:  Positive for palpitations. Negative for chest pain, dyspnea on exertion and leg swelling.   Respiratory:  Positive for shortness of breath. Negative for cough.    Gastrointestinal:  Negative for abdominal pain, nausea and vomiting.   Neurological:  Positive for light-headedness. Negative for dizziness, focal weakness, headaches and numbness.   All other systems reviewed and are negative.        Current Outpatient Medications:     carvedilol (COREG) 6.25 MG tablet, TAKE 1 TABLET BY MOUTH 2 (TWO) TIMES A DAY., Disp: 180 tablet, Rfl: 1    DULoxetine (CYMBALTA) 30 MG capsule, Take 1 capsule by mouth Every Morning., Disp: , Rfl:     Inclisiran Sodium (LEQVIO SC), Inject  under the skin into the appropriate area as directed Every 6 (Six) Months., Disp: , Rfl:     meloxicam (MOBIC) 15 MG tablet, Take 1 tablet by mouth Daily., Disp: , Rfl:     mirtazapine (REMERON) 30 MG tablet, Take 1 tablet by mouth Every Night.,  Disp: , Rfl:     pantoprazole (PROTONIX) 40 MG EC tablet, Take 1 tablet by mouth Daily., Disp: 30 tablet, Rfl: 0    rOPINIRole (REQUIP) 1 MG tablet, Take 1 tablet by mouth Every Night., Disp: , Rfl:     metFORMIN (GLUCOPHAGE) 500 MG tablet, Take 1 tablet by mouth 2 (Two) Times a Day With Meals. (Patient not taking: Reported on 10/11/2024), Disp: , Rfl:     Semaglutide-Weight Management (Wegovy) 1 MG/0.5ML solution auto-injector, Inject 0.5 mL under the skin into the appropriate area as directed. (Patient not taking: Reported on 10/11/2024), Disp: , Rfl:     Current outpatient and discharge medications have been reconciled for the patient.  Reviewed by: Jack Christensen MD       Allergies   Allergen Reactions    Ferumoxytol Anaphylaxis     Within 8 mins of infusion patient flushed including arms, diaphoretic, nauseous, chest and back pain.     Compazine [Prochlorperazine] Anxiety    Sulfa Antibiotics Other (See Comments)     Feels like I'm going to die.  Internal pain.    Atorvastatin Myalgia    Ciprofloxacin Rash     CAUSES AGITATION    Crestor [Rosuvastatin] Myalgia    Egg-Derived Products Nausea Only    Iron Other (See Comments)     Flu like symptoms    Prochlorperazine Edisylate Other (See Comments)     Jitters // Zofran    Trazodone Other (See Comments)     Unable to sleep       Family History   Problem Relation Age of Onset    Stroke Mother     Mental illness Father     Heart disease Father     Sleep apnea Neg Hx        Past Surgical History:   Procedure Laterality Date    BACK SURGERY      CARDIAC CATHETERIZATION Right 05/27/2023    Procedure: Left Heart Cath and coronary angiogram;  Surgeon: Jack Christensen MD;  Location: [facility] CATH INVASIVE LOCATION;  Service: Cardiovascular;  Laterality: Right;  5 Fr catheters    GALLBLADDER SURGERY      HYSTERECTOMY      MASS EXCISION Right 05/11/2022    Procedure: LIPOMA EXCISION from right shoulder;  Surgeon: Benjamin Munguia MD;  Location: [facility] MAIN OR;  Service: General;   "Laterality: Right;    NASAL SEPTUM SURGERY      TONSILLECTOMY         Past Medical History:   Diagnosis Date    Anesthesia complication     pt states she needs extra d/t red hair    Arthritis     Depressive disorder     Enlarged heart     Hyperlipidemia     Hypothyroid     Migraine     MRSA infection     on hands    Obstructive sleep apnea     PONV (postoperative nausea and vomiting)     Prediabetes     Tachycardia        Family History   Problem Relation Age of Onset    Stroke Mother     Mental illness Father     Heart disease Father     Sleep apnea Neg Hx        Social History     Socioeconomic History    Marital status:     Number of children: 2    Years of education: 14   Tobacco Use    Smoking status: Never     Passive exposure: Past    Smokeless tobacco: Never   Vaping Use    Vaping status: Never Used   Substance and Sexual Activity    Alcohol use: Not Currently    Drug use: Not Currently    Sexual activity: Defer               Objective:       Physical Exam    /76   Pulse 92   Ht 152.4 cm (60\")   Wt 80 kg (176 lb 6.4 oz)   SpO2 96%   BMI 34.45 kg/m²   The patient is alert, oriented and in no distress.    Vital signs as noted above.    Head and neck revealed no carotid bruits or jugular venous distension.  No thyromegaly or lymphadenopathy is present.    Lungs clear.  No wheezing.  Breath sounds are normal bilaterally.    Heart normal first and second heart sounds.  No murmur..  No pericardial rub is present.  No gallop is present.    Abdomen soft and nontender.  No organomegaly is present.    Extremities revealed good peripheral pulses without any pedal edema.    Skin warm and dry.    Musculoskeletal system is grossly normal.    CNS grossly normal.           Diagnosis Plan   1. Primary hypertension        2. Abnormal nuclear stress test        3. RALPH (dyspnea on exertion)        4. Mixed hyperlipidemia        5. Chest pain, unspecified type        6. BEBETO on CPAP        7. Diabetes mellitus " due to underlying condition with hyperglycemia, without long-term current use of insulin        LAB RESULTS (LAST 7 DAYS)    CBC        BMP        CMP         BNP        TROPONIN        CoAg        Creatinine Clearance  CrCl cannot be calculated (Patient's most recent lab result is older than the maximum 30 days allowed.).    ABG        Radiology  No radiology results for the last day    EKG  Procedures    Stress test  Results for orders placed during the hospital encounter of 05/26/23    Stress Test With Myocardial Perfusion One Day    Interpretation Summary    Findings consistent with a normal ECG stress test.    Left ventricular ejection fraction is hyperdynamic (Calculated EF > 70%).    Myocardial perfusion imaging indicates a small-sized, moderately severe area of ischemia located in the lateral wall.    Impressions are consistent with an intermediate risk study.      Echocardiogram  Results for orders placed in visit on 04/10/24    Adult Transthoracic Echo Complete W/ Cont if Necessary Per Protocol    Interpretation Summary    Left ventricular ejection fraction appears to be 51 - 55%.    Left ventricular diastolic function is consistent with (grade I) impaired relaxation.  GLS -13.8%.    Estimated right ventricular systolic pressure from tricuspid regurgitation is normal (<35 mmHg).    No significant valvular abnormalities noted.      Cardiac catheterization  Results for orders placed during the hospital encounter of 05/26/23    Cardiac Catheterization/Vascular Study    Conclusion  OPERATORS:  1. Jack Christensen M.D., Attending Cardiologist      PROCEDURE PERFORMED.  Ultrasound guided vascular access  Left heart catheterization  Coronary Angiogram 39907  Moderate Sedation    INDICATIONS FOR PROCEDURE.  63 years old woman presented with chest pain suspicious for unstable angina.  She was noted to have an abnormal nuclear stress test.  After discussing the risk and benefit of the procedure she was brought to the  Cath Lab for definitive coronary angiography.    PROCEDURE IN DETAIL.  Informed consent was obtained from the patient after explaining the risks, benefits, and alternative options of the procedure. After obtaining informed consent, the patient was brought to the cath lab and was prepped in a sterile fashion. Lidocaine 1% was used for local anesthesia into the right radial access site. The right radial artery was accessed under direct ultrasound visualization  with an angiocath needle via modified Seldinger technique. A 6F slender sheath was inserted successfully. Afterwards, 6F JR4  was advanced over a wire into the ascending aorta and used to cross the AV and obtain LV pressures.  AV gradient obtained via pullback technique. JR4 and JL3.5 diagnostic catheters were used to engage the ostia of the RCA and LM respectively. Images of the right and left coronary systems were obtained. All the catheters were exchanged over a wire and subsequently removed. The patient tolerated the procedure well without any complications. The pictures were reviewed at the end of the procedure. TR band applied to right wrist for hemostasis and inflated with 15 cc of air. No complications were encountered.    HEMODYNAMICS.  LV: 102/6, 12 mmHg  AO: 103/72, 84 mmHg  No significant gradient across the aortic valve during pullback of JR4 catheter.  LV gram was not performed due to recent echocardiogram.    FINDINGS.    Coronary Angiogram.    Left dominant coronary system    1. Left main. Left main is a large-caliber vessel which gives rise to the Left Anterior Descending and the Left circumflex.  Left main is angiographically free from any significant disease    2. Left Anterior Descending Artery. LAD is a large vessel which gives rise to several septal perforators and several diagonal branches. It is angiographically free from any significant disease    3. Left Circumflex. The LCx is a dominant large caliber caliber which gives rise to  marginals.  It also gives rise to the PDA.  Left circumflex artery is angiographically free from any significant disease    4. Right Coronary Artery. The RCA is a nondominant small vessel.    IMPRESSIONS.  1. Non-obstructive CAD  2.  Normal LVEDP    RECOMMENDATIONS.  1. Continue aggressive risk factor modification for primary prevention.  2. Exercise and lifestyle modifications recommended.          Assessment and Plan       Diagnoses and all orders for this visit:    1. Primary hypertension (Primary)    2. Abnormal nuclear stress test  Overview:  Added automatically from request for surgery [id number]      3. RALPH (dyspnea on exertion)    4. Mixed hyperlipidemia    5. Chest pain, unspecified type    6. BEBETO on CPAP    7. Diabetes mellitus due to underlying condition with hyperglycemia, without long-term current use of insulin         Chest pain  64 years old woman presents with chest pain concerning for unstable angina.  ECG showed nonspecific ST-T wave changes in the lateral leads.  Troponin was negative but Nuclear stress test showed small sized moderately severe ischemia located in the lateral wall.  Cardiac cath showed nonobstructive coronary disease.  Normal LVEDP  False positive nuclear stress test due to left dominant coronary system and body habitus.  Consider noncardiac etiology for chest pain.  Reassurance provided to the patient      Shortness of breath  Unclear etiology  Echocardiogram shows preserved LV function, grade 1 diastolic dysfunction  No significant valvular abnormalities noted  Likely secondary to obesity, sleep apnea  Excessive sweating and palpitations are likely due to deconditioning.  I have encouraged her to walk every day and continue to lose weight.     Hypertension  Continue Coreg.  Blood pressure is well-controlled.     Hyperlipidemia  , HDL 71, triglyceride 124, total cholesterol 234  Goal LDL less than 100.  LDL 69, HDL 71, triglyceride 149 and total cholesterol 165.  A1c  6.1  Continue Leqvio     Prediabetes  A1c is 6.1  Diet, exercise, weight loss discussed with the patient  Also discussed the possibility of starting metformin.     Obesity  BMI is 34.45.  She weighs 176 pounds.  She has lost 20 pounds  She could not tolerate Wegovy  Screening and treatment for sleep apnea  Diet, exercise, weight loss and lifestyle modifications discussed with the patient in details.     Anxiety/depression  Currently on Cymbalta.

## 2024-10-11 ENCOUNTER — OFFICE VISIT (OUTPATIENT)
Dept: CARDIOLOGY | Facility: CLINIC | Age: 64
End: 2024-10-11
Payer: MEDICARE

## 2024-10-11 VITALS
OXYGEN SATURATION: 96 % | DIASTOLIC BLOOD PRESSURE: 76 MMHG | HEART RATE: 92 BPM | HEIGHT: 60 IN | SYSTOLIC BLOOD PRESSURE: 123 MMHG | WEIGHT: 176.4 LBS | BODY MASS INDEX: 34.63 KG/M2

## 2024-10-11 DIAGNOSIS — R07.9 CHEST PAIN, UNSPECIFIED TYPE: ICD-10-CM

## 2024-10-11 DIAGNOSIS — E08.65 DIABETES MELLITUS DUE TO UNDERLYING CONDITION WITH HYPERGLYCEMIA, WITHOUT LONG-TERM CURRENT USE OF INSULIN: ICD-10-CM

## 2024-10-11 DIAGNOSIS — I10 PRIMARY HYPERTENSION: Primary | ICD-10-CM

## 2024-10-11 DIAGNOSIS — G47.33 OSA ON CPAP: ICD-10-CM

## 2024-10-11 DIAGNOSIS — R94.39 ABNORMAL NUCLEAR STRESS TEST: ICD-10-CM

## 2024-10-11 DIAGNOSIS — R06.09 DOE (DYSPNEA ON EXERTION): ICD-10-CM

## 2024-10-11 DIAGNOSIS — E78.2 MIXED HYPERLIPIDEMIA: ICD-10-CM

## 2024-10-11 PROCEDURE — 1160F RVW MEDS BY RX/DR IN RCRD: CPT | Performed by: INTERNAL MEDICINE

## 2024-10-11 PROCEDURE — 1159F MED LIST DOCD IN RCRD: CPT | Performed by: INTERNAL MEDICINE

## 2024-10-11 PROCEDURE — 99214 OFFICE O/P EST MOD 30 MIN: CPT | Performed by: INTERNAL MEDICINE

## 2024-10-11 RX ORDER — ROPINIROLE 1 MG/1
1 TABLET, FILM COATED ORAL NIGHTLY
COMMUNITY
Start: 2024-07-16

## 2024-10-16 ENCOUNTER — HOSPITAL ENCOUNTER (OUTPATIENT)
Dept: SLEEP MEDICINE | Facility: HOSPITAL | Age: 64
End: 2024-10-16
Payer: MEDICARE

## 2024-10-16 DIAGNOSIS — E66.9 OBESITY (BMI 30-39.9): ICD-10-CM

## 2024-10-16 DIAGNOSIS — G47.33 OBSTRUCTIVE SLEEP APNEA, ADULT: ICD-10-CM

## 2024-10-16 DIAGNOSIS — Z78.9 INTOLERANCE OF CONTINUOUS POSITIVE AIRWAY PRESSURE (CPAP) VENTILATION: ICD-10-CM

## 2024-10-16 DIAGNOSIS — G47.10 HYPERSOMNIA: ICD-10-CM

## 2024-10-16 DIAGNOSIS — G47.8 NON-RESTORATIVE SLEEP: ICD-10-CM

## 2024-10-16 PROCEDURE — 95806 SLEEP STUDY UNATT&RESP EFFT: CPT

## 2024-11-01 DIAGNOSIS — Z78.9 INTOLERANCE OF CONTINUOUS POSITIVE AIRWAY PRESSURE (CPAP) VENTILATION: ICD-10-CM

## 2024-11-01 DIAGNOSIS — G47.33 OBSTRUCTIVE SLEEP APNEA, ADULT: Primary | ICD-10-CM

## 2024-11-01 DIAGNOSIS — G47.10 HYPERSOMNIA: ICD-10-CM

## 2024-11-01 DIAGNOSIS — G47.00 INSOMNIA, UNSPECIFIED TYPE: ICD-10-CM

## 2024-11-01 DIAGNOSIS — E66.9 OBESITY (BMI 30-39.9): ICD-10-CM

## 2024-11-01 DIAGNOSIS — G47.8 NON-RESTORATIVE SLEEP: ICD-10-CM

## 2024-12-09 ENCOUNTER — TELEPHONE (OUTPATIENT)
Dept: CARDIOLOGY | Facility: CLINIC | Age: 64
End: 2024-12-09
Payer: MEDICARE

## 2024-12-09 NOTE — TELEPHONE ENCOUNTER
[facility] and is asking if we will send orders for Pt's Leqvio before Thursday, as the orders he has is .

## 2024-12-10 DIAGNOSIS — E78.2 MIXED HYPERLIPIDEMIA: Primary | ICD-10-CM

## 2024-12-10 DIAGNOSIS — Z88.8 ALLERGY TO STATIN MEDICATION: ICD-10-CM

## 2024-12-12 ENCOUNTER — HOSPITAL ENCOUNTER (OUTPATIENT)
Dept: ONCOLOGY | Facility: HOSPITAL | Age: 64
Discharge: HOME | End: 2024-12-12
Payer: MEDICARE

## 2024-12-12 DIAGNOSIS — E78.2 MIXED HYPERLIPIDEMIA: ICD-10-CM

## 2024-12-12 DIAGNOSIS — Z88.8 ALLERGY TO STATIN MEDICATION: Primary | ICD-10-CM

## 2024-12-12 PROCEDURE — 25010000002 INCLISIRAN SODIUM 284 MG/1.5ML SOLUTION PREFILLED SYRINGE: Performed by: NURSE PRACTITIONER

## 2024-12-12 PROCEDURE — 96372 THER/PROPH/DIAG INJ SC/IM: CPT

## 2024-12-12 RX ADMIN — INCLISIRAN 284 MG: 284 INJECTION, SOLUTION SUBCUTANEOUS at 13:11

## 2024-12-17 DIAGNOSIS — E66.9 OBESITY (BMI 30-39.9): ICD-10-CM

## 2024-12-17 DIAGNOSIS — G47.8 NON-RESTORATIVE SLEEP: ICD-10-CM

## 2024-12-17 DIAGNOSIS — G47.00 INSOMNIA, UNSPECIFIED TYPE: ICD-10-CM

## 2024-12-17 DIAGNOSIS — Z78.9 INTOLERANCE OF CONTINUOUS POSITIVE AIRWAY PRESSURE (CPAP) VENTILATION: ICD-10-CM

## 2024-12-17 DIAGNOSIS — G47.10 HYPERSOMNIA: ICD-10-CM

## 2024-12-17 DIAGNOSIS — G47.33 OBSTRUCTIVE SLEEP APNEA, ADULT: Primary | ICD-10-CM

## 2024-12-30 ENCOUNTER — TELEPHONE (OUTPATIENT)
Dept: SLEEP MEDICINE | Facility: CLINIC | Age: 64
End: 2024-12-30
Payer: MEDICARE

## 2024-12-30 NOTE — TELEPHONE ENCOUNTER
Patient called and left a voicemail wanting to verify who she is suppose to have her Inspire implant done with. Called and left a voicemail advising that her referral was sent to Dr. Manny Tiwari at [facility]. If patient calls back, please advise her of this information.

## 2025-01-25 ENCOUNTER — APPOINTMENT (OUTPATIENT)
Dept: GENERAL RADIOLOGY | Facility: HOSPITAL | Age: 65
End: 2025-01-25
Payer: MEDICARE

## 2025-01-25 ENCOUNTER — HOSPITAL ENCOUNTER (OUTPATIENT)
Facility: HOSPITAL | Age: 65
Discharge: HOME | End: 2025-01-27
Attending: EMERGENCY MEDICINE | Admitting: EMERGENCY MEDICINE
Payer: MEDICARE

## 2025-01-25 ENCOUNTER — APPOINTMENT (OUTPATIENT)
Dept: CT IMAGING | Facility: HOSPITAL | Age: 65
End: 2025-01-25
Payer: MEDICARE

## 2025-01-25 DIAGNOSIS — R94.39 ABNORMAL NUCLEAR STRESS TEST: ICD-10-CM

## 2025-01-25 DIAGNOSIS — J98.11 MILD BIBASILAR ATELECTASIS: ICD-10-CM

## 2025-01-25 DIAGNOSIS — K44.9 HIATAL HERNIA: Primary | ICD-10-CM

## 2025-01-25 DIAGNOSIS — R79.89 ELEVATED TROPONIN: ICD-10-CM

## 2025-01-25 DIAGNOSIS — R07.9 CHEST PAIN, UNSPECIFIED TYPE: ICD-10-CM

## 2025-01-25 LAB
ALBUMIN SERPL-MCNC: 4.4 G/DL (ref 3.5–5.2)
ALBUMIN/GLOB SERPL: 1.8 G/DL
ALP SERPL-CCNC: 109 U/L (ref 39–117)
ALT SERPL W P-5'-P-CCNC: 19 U/L (ref 1–33)
ANION GAP SERPL CALCULATED.3IONS-SCNC: 10.7 MMOL/L (ref 5–15)
AST SERPL-CCNC: 28 U/L (ref 1–32)
BASOPHILS # BLD AUTO: 0.02 10*3/MM3 (ref 0–0.2)
BASOPHILS NFR BLD AUTO: 0.2 % (ref 0–1.5)
BILIRUB SERPL-MCNC: <0.2 MG/DL (ref 0–1.2)
BUN SERPL-MCNC: 17 MG/DL (ref 8–23)
BUN/CREAT SERPL: 19.1 (ref 7–25)
CALCIUM SPEC-SCNC: 9.4 MG/DL (ref 8.6–10.5)
CHLORIDE SERPL-SCNC: 105 MMOL/L (ref 98–107)
CO2 SERPL-SCNC: 24.3 MMOL/L (ref 22–29)
CREAT SERPL-MCNC: 0.89 MG/DL (ref 0.57–1)
D DIMER PPP FEU-MCNC: 0.29 MCGFEU/ML (ref 0–0.63)
DEPRECATED RDW RBC AUTO: 47.7 FL (ref 37–54)
EGFRCR SERPLBLD CKD-EPI 2021: 73 ML/MIN/1.73
EOSINOPHIL # BLD AUTO: 0.12 10*3/MM3 (ref 0–0.4)
EOSINOPHIL NFR BLD AUTO: 1.3 % (ref 0.3–6.2)
ERYTHROCYTE [DISTWIDTH] IN BLOOD BY AUTOMATED COUNT: 14.6 % (ref 12.3–15.4)
GEN 5 1HR TROPONIN T REFLEX: 16 NG/L
GLOBULIN UR ELPH-MCNC: 2.4 GM/DL
GLUCOSE SERPL-MCNC: 96 MG/DL (ref 65–99)
HCT VFR BLD AUTO: 32 % (ref 34–46.6)
HGB BLD-MCNC: 10.3 G/DL (ref 12–15.9)
HOLD SPECIMEN: NORMAL
IMM GRANULOCYTES # BLD AUTO: 0.02 10*3/MM3 (ref 0–0.05)
IMM GRANULOCYTES NFR BLD AUTO: 0.2 % (ref 0–0.5)
LYMPHOCYTES # BLD AUTO: 2.57 10*3/MM3 (ref 0.7–3.1)
LYMPHOCYTES NFR BLD AUTO: 28.2 % (ref 19.6–45.3)
MCH RBC QN AUTO: 28.5 PG (ref 26.6–33)
MCHC RBC AUTO-ENTMCNC: 32.2 G/DL (ref 31.5–35.7)
MCV RBC AUTO: 88.4 FL (ref 79–97)
MONOCYTES # BLD AUTO: 0.47 10*3/MM3 (ref 0.1–0.9)
MONOCYTES NFR BLD AUTO: 5.2 % (ref 5–12)
NEUTROPHILS NFR BLD AUTO: 5.9 10*3/MM3 (ref 1.7–7)
NEUTROPHILS NFR BLD AUTO: 64.9 % (ref 42.7–76)
NRBC BLD AUTO-RTO: 0 /100 WBC (ref 0–0.2)
PLATELET # BLD AUTO: 322 10*3/MM3 (ref 140–450)
PMV BLD AUTO: 10 FL (ref 6–12)
POTASSIUM SERPL-SCNC: 4.8 MMOL/L (ref 3.5–5.2)
PROT SERPL-MCNC: 6.8 G/DL (ref 6–8.5)
RBC # BLD AUTO: 3.62 10*6/MM3 (ref 3.77–5.28)
SODIUM SERPL-SCNC: 140 MMOL/L (ref 136–145)
TROPONIN T NUMERIC DELTA: 10 NG/L
TROPONIN T SERPL HS-MCNC: 6 NG/L
WBC NRBC COR # BLD AUTO: 9.1 10*3/MM3 (ref 3.4–10.8)

## 2025-01-25 PROCEDURE — 71275 CT ANGIOGRAPHY CHEST: CPT

## 2025-01-25 PROCEDURE — 36415 COLL VENOUS BLD VENIPUNCTURE: CPT

## 2025-01-25 PROCEDURE — G0378 HOSPITAL OBSERVATION PER HR: HCPCS

## 2025-01-25 PROCEDURE — 25010000002 MORPHINE PER 10 MG: Performed by: EMERGENCY MEDICINE

## 2025-01-25 PROCEDURE — 96374 THER/PROPH/DIAG INJ IV PUSH: CPT

## 2025-01-25 PROCEDURE — 93005 ELECTROCARDIOGRAM TRACING: CPT | Performed by: EMERGENCY MEDICINE

## 2025-01-25 PROCEDURE — 80053 COMPREHEN METABOLIC PANEL: CPT | Performed by: EMERGENCY MEDICINE

## 2025-01-25 PROCEDURE — 25510000001 IOPAMIDOL PER 1 ML: Performed by: EMERGENCY MEDICINE

## 2025-01-25 PROCEDURE — 25010000002 ONDANSETRON PER 1 MG: Performed by: EMERGENCY MEDICINE

## 2025-01-25 PROCEDURE — 71045 X-RAY EXAM CHEST 1 VIEW: CPT

## 2025-01-25 PROCEDURE — 85379 FIBRIN DEGRADATION QUANT: CPT | Performed by: EMERGENCY MEDICINE

## 2025-01-25 PROCEDURE — 96375 TX/PRO/DX INJ NEW DRUG ADDON: CPT

## 2025-01-25 PROCEDURE — 85025 COMPLETE CBC W/AUTO DIFF WBC: CPT | Performed by: EMERGENCY MEDICINE

## 2025-01-25 PROCEDURE — 99285 EMERGENCY DEPT VISIT HI MDM: CPT

## 2025-01-25 PROCEDURE — 84484 ASSAY OF TROPONIN QUANT: CPT | Performed by: EMERGENCY MEDICINE

## 2025-01-25 RX ORDER — SODIUM CHLORIDE 0.9 % (FLUSH) 0.9 %
10 SYRINGE (ML) INJECTION AS NEEDED
Status: DISCONTINUED | OUTPATIENT
Start: 2025-01-25 | End: 2025-01-27 | Stop reason: HOSPADM

## 2025-01-25 RX ORDER — DULOXETIN HYDROCHLORIDE 60 MG/1
60 CAPSULE, DELAYED RELEASE ORAL DAILY
COMMUNITY

## 2025-01-25 RX ORDER — IPRATROPIUM BROMIDE 21 UG/1
2 SPRAY, METERED NASAL 2 TIMES DAILY
COMMUNITY
Start: 2025-01-24

## 2025-01-25 RX ORDER — SODIUM CHLORIDE 0.9 % (FLUSH) 0.9 %
10 SYRINGE (ML) INJECTION EVERY 12 HOURS SCHEDULED
Status: DISCONTINUED | OUTPATIENT
Start: 2025-01-25 | End: 2025-01-27 | Stop reason: HOSPADM

## 2025-01-25 RX ORDER — ONDANSETRON 2 MG/ML
4 INJECTION INTRAMUSCULAR; INTRAVENOUS ONCE
Status: COMPLETED | OUTPATIENT
Start: 2025-01-25 | End: 2025-01-25

## 2025-01-25 RX ORDER — IOPAMIDOL 755 MG/ML
100 INJECTION, SOLUTION INTRAVASCULAR
Status: COMPLETED | OUTPATIENT
Start: 2025-01-25 | End: 2025-01-25

## 2025-01-25 RX ORDER — ONDANSETRON 2 MG/ML
4 INJECTION INTRAMUSCULAR; INTRAVENOUS EVERY 6 HOURS PRN
Status: DISCONTINUED | OUTPATIENT
Start: 2025-01-25 | End: 2025-01-27 | Stop reason: SDUPTHER

## 2025-01-25 RX ORDER — SODIUM CHLORIDE 9 MG/ML
40 INJECTION, SOLUTION INTRAVENOUS AS NEEDED
Status: DISCONTINUED | OUTPATIENT
Start: 2025-01-25 | End: 2025-01-27 | Stop reason: HOSPADM

## 2025-01-25 RX ADMIN — MORPHINE SULFATE 4 MG: 4 INJECTION, SOLUTION INTRAMUSCULAR; INTRAVENOUS at 19:04

## 2025-01-25 RX ADMIN — Medication 10 ML: at 23:14

## 2025-01-25 RX ADMIN — IOPAMIDOL 100 ML: 755 INJECTION, SOLUTION INTRAVENOUS at 20:31

## 2025-01-25 RX ADMIN — ONDANSETRON 4 MG: 2 INJECTION, SOLUTION INTRAMUSCULAR; INTRAVENOUS at 19:04

## 2025-01-25 NOTE — Clinical Note
Prepped: right neck and Right Wrist. Prepped with: ChloraPrep. The site was clipped. The patient was draped in a sterile fashion.

## 2025-01-25 NOTE — Clinical Note
A 7 fr sheath was successfully inserted using micropuncture technique with ultrasound guidance into the right internal jugular vein.

## 2025-01-26 ENCOUNTER — APPOINTMENT (OUTPATIENT)
Dept: NUCLEAR MEDICINE | Facility: HOSPITAL | Age: 65
End: 2025-01-26
Payer: MEDICARE

## 2025-01-26 LAB
BH CV REST NUCLEAR ISOTOPE DOSE: 11 MCI
BH CV STRESS BP STAGE 1: NORMAL
BH CV STRESS BP STAGE 2: NORMAL
BH CV STRESS COMMENTS STAGE 1: NORMAL
BH CV STRESS COMMENTS STAGE 2: NORMAL
BH CV STRESS DOSE REGADENOSON STAGE 1: 0.4
BH CV STRESS DURATION MIN STAGE 1: 0
BH CV STRESS DURATION MIN STAGE 2: 4
BH CV STRESS DURATION SEC STAGE 1: 10
BH CV STRESS DURATION SEC STAGE 2: 0
BH CV STRESS HR STAGE 1: 71
BH CV STRESS HR STAGE 2: 90
BH CV STRESS NUCLEAR ISOTOPE DOSE: 33 MCI
BH CV STRESS PROTOCOL 1: NORMAL
BH CV STRESS RECOVERY BP: NORMAL MMHG
BH CV STRESS RECOVERY HR: 86 BPM
BH CV STRESS STAGE 1: 1
BH CV STRESS STAGE 2: 2
CHOLEST SERPL-MCNC: 137 MG/DL (ref 0–200)
HDLC SERPL-MCNC: 55 MG/DL (ref 40–60)
LDLC SERPL CALC-MCNC: 57 MG/DL (ref 0–100)
LDLC/HDLC SERPL: 0.97 {RATIO}
MAXIMAL PREDICTED HEART RATE: 157 BPM
NT-PROBNP SERPL-MCNC: 45.2 PG/ML (ref 0–900)
PERCENT MAX PREDICTED HR: 58.6 %
QT INTERVAL: 400 MS
QT INTERVAL: 404 MS
QTC INTERVAL: 465 MS
QTC INTERVAL: 467 MS
STRESS BASELINE BP: NORMAL MMHG
STRESS BASELINE HR: 71 BPM
STRESS PERCENT HR: 69 %
STRESS POST PEAK BP: NORMAL MMHG
STRESS POST PEAK HR: 92 BPM
STRESS TARGET HR: 133 BPM
TRIGL SERPL-MCNC: 144 MG/DL (ref 0–150)
TROPONIN T SERPL HS-MCNC: 8 NG/L
VLDLC SERPL-MCNC: 25 MG/DL (ref 5–40)

## 2025-01-26 PROCEDURE — G0378 HOSPITAL OBSERVATION PER HR: HCPCS

## 2025-01-26 PROCEDURE — 93016 CV STRESS TEST SUPVJ ONLY: CPT

## 2025-01-26 PROCEDURE — 99214 OFFICE O/P EST MOD 30 MIN: CPT | Performed by: INTERNAL MEDICINE

## 2025-01-26 PROCEDURE — 78452 HT MUSCLE IMAGE SPECT MULT: CPT | Performed by: INTERNAL MEDICINE

## 2025-01-26 PROCEDURE — 93017 CV STRESS TEST TRACING ONLY: CPT

## 2025-01-26 PROCEDURE — 84484 ASSAY OF TROPONIN QUANT: CPT | Performed by: EMERGENCY MEDICINE

## 2025-01-26 PROCEDURE — 83880 ASSAY OF NATRIURETIC PEPTIDE: CPT | Performed by: PHYSICIAN ASSISTANT

## 2025-01-26 PROCEDURE — 34310000005 TECHNETIUM TETROFOSMIN KIT: Performed by: EMERGENCY MEDICINE

## 2025-01-26 PROCEDURE — 93018 CV STRESS TEST I&R ONLY: CPT | Performed by: INTERNAL MEDICINE

## 2025-01-26 PROCEDURE — 25010000002 REGADENOSON 0.4 MG/5ML SOLUTION: Performed by: EMERGENCY MEDICINE

## 2025-01-26 PROCEDURE — 78452 HT MUSCLE IMAGE SPECT MULT: CPT

## 2025-01-26 PROCEDURE — 80061 LIPID PANEL: CPT | Performed by: EMERGENCY MEDICINE

## 2025-01-26 PROCEDURE — A9502 TC99M TETROFOSMIN: HCPCS | Performed by: EMERGENCY MEDICINE

## 2025-01-26 RX ORDER — DULOXETIN HYDROCHLORIDE 30 MG/1
90 CAPSULE, DELAYED RELEASE ORAL EVERY MORNING
Status: DISCONTINUED | OUTPATIENT
Start: 2025-01-26 | End: 2025-01-27 | Stop reason: HOSPADM

## 2025-01-26 RX ORDER — ALUMINA, MAGNESIA, AND SIMETHICONE 2400; 2400; 240 MG/30ML; MG/30ML; MG/30ML
15 SUSPENSION ORAL EVERY 6 HOURS PRN
Status: DISCONTINUED | OUTPATIENT
Start: 2025-01-26 | End: 2025-01-27 | Stop reason: HOSPADM

## 2025-01-26 RX ORDER — DULOXETIN HYDROCHLORIDE 30 MG/1
60 CAPSULE, DELAYED RELEASE ORAL DAILY
Status: DISCONTINUED | OUTPATIENT
Start: 2025-01-26 | End: 2025-01-26 | Stop reason: SDUPTHER

## 2025-01-26 RX ORDER — REGADENOSON 0.08 MG/ML
0.4 INJECTION, SOLUTION INTRAVENOUS
Status: COMPLETED | OUTPATIENT
Start: 2025-01-26 | End: 2025-01-26

## 2025-01-26 RX ORDER — MIRTAZAPINE 15 MG/1
15 TABLET, FILM COATED ORAL NIGHTLY
Status: DISCONTINUED | OUTPATIENT
Start: 2025-01-26 | End: 2025-01-27 | Stop reason: HOSPADM

## 2025-01-26 RX ORDER — MIDODRINE HYDROCHLORIDE 5 MG/1
10 TABLET ORAL EVERY 8 HOURS
Status: DISCONTINUED | OUTPATIENT
Start: 2025-01-26 | End: 2025-01-27 | Stop reason: HOSPADM

## 2025-01-26 RX ORDER — CARVEDILOL 6.25 MG/1
6.25 TABLET ORAL 2 TIMES DAILY
Status: DISCONTINUED | OUTPATIENT
Start: 2025-01-26 | End: 2025-01-27 | Stop reason: HOSPADM

## 2025-01-26 RX ORDER — PANTOPRAZOLE SODIUM 40 MG/1
40 TABLET, DELAYED RELEASE ORAL DAILY
Status: DISCONTINUED | OUTPATIENT
Start: 2025-01-26 | End: 2025-01-27 | Stop reason: HOSPADM

## 2025-01-26 RX ORDER — ASPIRIN 325 MG
325 TABLET ORAL ONCE
Status: COMPLETED | OUTPATIENT
Start: 2025-01-27 | End: 2025-01-27

## 2025-01-26 RX ORDER — LIDOCAINE HYDROCHLORIDE 20 MG/ML
5 SOLUTION OROPHARYNGEAL
Status: DISCONTINUED | OUTPATIENT
Start: 2025-01-26 | End: 2025-01-27 | Stop reason: HOSPADM

## 2025-01-26 RX ADMIN — DULOXETINE 90 MG: 30 CAPSULE, DELAYED RELEASE ORAL at 12:03

## 2025-01-26 RX ADMIN — MIDODRINE HYDROCHLORIDE 10 MG: 5 TABLET ORAL at 15:02

## 2025-01-26 RX ADMIN — MIRTAZAPINE 15 MG: 15 TABLET, FILM COATED ORAL at 01:15

## 2025-01-26 RX ADMIN — TETROFOSMIN 1 DOSE: 1.38 INJECTION, POWDER, LYOPHILIZED, FOR SOLUTION INTRAVENOUS at 07:32

## 2025-01-26 RX ADMIN — ALUMINUM HYDROXIDE, MAGNESIUM HYDROXIDE, AND DIMETHICONE 15 ML: 400; 400; 40 SUSPENSION ORAL at 13:43

## 2025-01-26 RX ADMIN — Medication 10 ML: at 20:23

## 2025-01-26 RX ADMIN — MIRTAZAPINE 15 MG: 15 TABLET, FILM COATED ORAL at 21:40

## 2025-01-26 RX ADMIN — PANTOPRAZOLE SODIUM 40 MG: 40 TABLET, DELAYED RELEASE ORAL at 12:03

## 2025-01-26 RX ADMIN — NITROGLYCERIN 1 INCH: 20 OINTMENT TOPICAL at 01:14

## 2025-01-26 RX ADMIN — LIDOCAINE HYDROCHLORIDE 5 ML: 20 SOLUTION ORAL at 13:43

## 2025-01-26 RX ADMIN — REGADENOSON 0.4 MG: 0.08 INJECTION, SOLUTION INTRAVENOUS at 09:13

## 2025-01-26 RX ADMIN — MIDODRINE HYDROCHLORIDE 10 MG: 5 TABLET ORAL at 22:31

## 2025-01-26 RX ADMIN — LIDOCAINE HYDROCHLORIDE 5 ML: 20 SOLUTION ORAL at 21:40

## 2025-01-26 RX ADMIN — TETROFOSMIN 1 DOSE: 1.38 INJECTION, POWDER, LYOPHILIZED, FOR SOLUTION INTRAVENOUS at 09:13

## 2025-01-26 NOTE — PLAN OF CARE
Problem: Adult Inpatient Plan of Care  Goal: Plan of Care Review  Outcome: Progressing  Flowsheets (Taken 1/26/2025 0607)  Progress: no change  Outcome Evaluation: Patient to have Myoview this AM. Bed in lowest position, call light within reach.  Plan of Care Reviewed With: patient  Goal: Patient-Specific Goal (Individualized)  Outcome: Progressing  Goal: Absence of Hospital-Acquired Illness or Injury  Outcome: Progressing  Intervention: Identify and Manage Fall Risk  Description: Perform standard risk assessment on admission using a validated tool or comprehensive approach appropriate to the patient; reassess fall risk frequently, with change in status or transfer to another level of care.  Communicate risk to interprofessional healthcare team; ensure fall risk visible cue.  Determine need for increased observation, equipment and environmental modification, as well as use of supportive, nonskid footwear.  Adjust safety measures to individual needs and identified risk factors.  Reinforce the importance of active participation with fall risk prevention, safety, and physical activity with the patient and family.  Perform regular intentional rounding to assess need for position change, pain assessment and personal needs, including assistance with toileting.  Recent Flowsheet Documentation  Taken 1/26/2025 0200 by Faustina Granda, RN  Safety Promotion/Fall Prevention: safety round/check completed  Taken 1/26/2025 0000 by Faustina Granda, RN  Safety Promotion/Fall Prevention: safety round/check completed  Taken 1/25/2025 2309 by Faustina Granda, RN  Safety Promotion/Fall Prevention: safety round/check completed  Intervention: Prevent Skin Injury  Description: Perform a screening for skin injury risk, such as pressure or moisture-associated skin damage on admission and at regular intervals throughout hospital stay.  Keep all areas of skin (especially folds) clean and dry.  Maintain adequate skin  hydration.  Relieve and redistribute pressure and protect bony prominences and skin at risk for injury; implement measures based on patient-specific risk factors.  Match turning and repositioning schedule to clinical condition.  Encourage weight shift frequently; assist with reposition if unable to complete independently.  Float heels off bed; avoid pressure on the Achilles tendon.  Keep skin free from extended contact with medical devices.  Optimize nutrition and hydration.  Encourage functional activity and mobility, as early as tolerated.  Use aids (e.g., slide boards, mechanical lift) during transfer.  Recent Flowsheet Documentation  Taken 1/25/2025 2309 by Faustina Granda RN  Body Position: position changed independently  Intervention: Prevent and Manage VTE (Venous Thromboembolism) Risk  Description: Assess for VTE (venous thromboembolism) risk.  Promote early mobilization; encourage both active and passive leg exercises, if unable to ambulate.  Initiate and maintain compression or other therapy, as indicated, based on identified risk in accordance with organizational protocol and provider order.  Recognize the patient's individual risk for bleeding before initiating pharmacologic thromboprophylaxis.  Recent Flowsheet Documentation  Taken 1/25/2025 2309 by Faustina Granda RN  VTE Prevention/Management:   bilateral   SCDs (sequential compression devices) off  Intervention: Prevent Infection  Description: Maintain skin and mucous membrane integrity; promote hand, oral and pulmonary hygiene.  Optimize fluid balance, nutrition, sleep and glycemic control to maximize infection resistance.  Identify potential sources of infection early to prevent or mitigate progression of infection (e.g., wound, lines, devices).  Evaluate ongoing need for invasive devices; remove promptly when no longer indicated.  Review vaccination status.  Recent Flowsheet Documentation  Taken 1/26/2025 0200 by Faustina Granda  RN  Infection Prevention:   hand hygiene promoted   rest/sleep promoted   single patient room provided  Taken 1/26/2025 0000 by Faustina Granda RN  Infection Prevention:   hand hygiene promoted   rest/sleep promoted   single patient room provided  Taken 1/25/2025 2309 by Faustina Granda RN  Infection Prevention:   hand hygiene promoted   rest/sleep promoted   single patient room provided  Goal: Optimal Comfort and Wellbeing  Outcome: Progressing  Intervention: Provide Person-Centered Care  Description: Use a family-focused approach to care; encourage support system presence and participation.  Develop trust and rapport by proactively providing information, encouraging questions, addressing concerns and offering reassurance.  Acknowledge emotional response to hospitalization.  Recognize and utilize personal coping strategies and strengths; develop goals via shared decision-making.  Honor spiritual and cultural preferences.  Recent Flowsheet Documentation  Taken 1/25/2025 2309 by Faustina Granda RN  Trust Relationship/Rapport:   care explained   choices provided   emotional support provided   empathic listening provided   questions answered   questions encouraged   reassurance provided   thoughts/feelings acknowledged  Goal: Readiness for Transition of Care  Outcome: Progressing  Intervention: Mutually Develop Transition Plan  Description: Identify available resources for support (e.g., family, friends, community).  Identify and address barriers to ongoing treatment and home management (e.g., environmental, financial).  Provide opportunities to practice self-management skills.  Assess and monitor emotional readiness for transition.  Establish or reconnect linkage with outpatient providers or community-based services.  Recent Flowsheet Documentation  Taken 1/25/2025 2312 by Faustina Granda RN  Transportation Anticipated: family or friend will provide  Patient/Family Anticipated Services at Transition:  none  Patient/Family Anticipates Transition to: ([facility]) other (see comments)  Taken 1/25/2025 2307 by Faustina Granda, RN  Equipment Currently Used at Home:   cpap   grab bar   shower chair     Problem: Comorbidity Management  Goal: Blood Pressure in Desired Range  Outcome: Progressing  Intervention: Maintain Blood Pressure Management  Description: Evaluate adherence to home antihypertensive regimen (e.g., exercise and activity, diet modification, medication).  Provide scheduled antihypertensive medication; consider administration time and effects (e.g., avoid giving diuretic prior to bedtime).  Monitor response to antihypertensive medication therapy (e.g., blood pressure, electrolyte levels, medication effects).  Minimize risk of orthostatic hypotension; encourage caution with position changes, particularly if elderly.  Recent Flowsheet Documentation  Taken 1/25/2025 2309 by Faustina Granda, RN  Medication Review/Management: medications reviewed   Goal Outcome Evaluation:  Plan of Care Reviewed With: patient        Progress: no change  Outcome Evaluation: Patient to have Myoview this AM. Bed in lowest position, call light within reach.

## 2025-01-26 NOTE — CONSULTS
Referring Provider: Jose Gallardo MD  Reason for Consultation: Chest pain    Patient Care Team:  Amilcar Cross MD as PCP - General (Family Medicine)  Benjamin Munguia MD as Consulting Physician (General Surgery)  Jack Christensen MD as Cardiologist (Cardiology)    Chief complaint chest pain    Subjective .     History of present illness:  Gina Wright is a 65 y.o. female with a history of dyslipidemia, hypertension,  prediabetes who presents with nonradiating chest pain.  Patient reports symptoms beginning 2 days ago.  Associated symptoms of shortness of breath, decreased activity/exercise tolerance.  She denies palpitations, edema, syncope, fever, chills, recent illness.   Cardiology consulted for chest pain.  Troponin 6--16, proBNP 45.2, D-dimer unremarkable, lipid panel with LDL of 57.  She is a known patient of Dr. Christensen.  Prior cardiac catheterization from 2023 reviewed showing coronary arteries angiographically free of any significant disease.  Echocardiogram with LVEF of 51 to 55%, grade 1 diastolic dysfunction and no significant valvular abnormalities.    ROS  + Chest pain  + RALPH    Since I have last seen, the patient has been without any palpitations, dizziness or syncope.  Denies having any headache, abdominal pain, nausea, vomiting, diarrhea, constipation, loss of weight or loss of appetite.  Denies having any excessive bruising, hematuria or blood in the stool.    Review of all systems negative except as indicated      History  Past Medical History:   Diagnosis Date    Anesthesia complication     pt states she needs extra d/t red hair    Arthritis     Depressive disorder     Enlarged heart     Hyperlipidemia     Hypothyroid     Migraine     MRSA infection     on hands    Obstructive sleep apnea     PONV (postoperative nausea and vomiting)     Prediabetes     Tachycardia        Past Surgical History:   Procedure Laterality Date    BACK SURGERY      CARDIAC CATHETERIZATION Right 05/27/2023     Procedure: Left Heart Cath and coronary angiogram;  Surgeon: Jack Christensen MD;  Location: [facility] CATH INVASIVE LOCATION;  Service: Cardiovascular;  Laterality: Right;  5 Fr catheters    GALLBLADDER SURGERY      HYSTERECTOMY      MASS EXCISION Right 05/11/2022    Procedure: LIPOMA EXCISION from right shoulder;  Surgeon: Benjamin Munguia MD;  Location: [facility] MAIN OR;  Service: General;  Laterality: Right;    NASAL SEPTUM SURGERY      TONSILLECTOMY         Family History   Problem Relation Age of Onset    Stroke Mother     Mental illness Father     Heart disease Father     Sleep apnea Neg Hx        Social History     Tobacco Use    Smoking status: Never     Passive exposure: Past    Smokeless tobacco: Never   Vaping Use    Vaping status: Never Used   Substance Use Topics    Alcohol use: Not Currently    Drug use: Not Currently        Medications Prior to Admission   Medication Sig Dispense Refill Last Dose/Taking    carvedilol (COREG) 6.25 MG tablet TAKE 1 TABLET BY MOUTH 2 (TWO) TIMES A DAY. 180 tablet 1 1/25/2025 Morning    DULoxetine (CYMBALTA) 30 MG capsule Take 1 capsule by mouth Every Morning.   1/25/2025 Morning    DULoxetine (CYMBALTA) 60 MG capsule Take 1 capsule by mouth Daily. Take along with Duloxetine 30 mg to equal 90 mg daily dose.   1/25/2025 Morning    Inclisiran Sodium (LEQVIO SC) Inject  under the skin into the appropriate area as directed Every 6 (Six) Months.   12/12/2024    ipratropium (ATROVENT) 0.03 % nasal spray Administer 2 sprays into the nostril(s) as directed by provider 2 (Two) Times a Day.   1/25/2025 Morning    meloxicam (MOBIC) 15 MG tablet Take 1 tablet by mouth Daily.   1/25/2025 Morning    mirtazapine (REMERON) 15 MG tablet Take 1 tablet by mouth Every Night.   1/24/2025 Evening    pantoprazole (PROTONIX) 40 MG EC tablet Take 1 tablet by mouth Daily. 30 tablet 0 1/25/2025 Morning    Semaglutide-Weight Management 0.25 MG/0.5ML solution auto-injector Inject 0.5 mL under the skin into  "the appropriate area as directed Every 7 (Seven) Days. Sundays.   1/19/2025         Ferumoxytol, Compazine [prochlorperazine], Sulfa antibiotics, Atorvastatin, Ciprofloxacin, Crestor [rosuvastatin], Egg-derived products, Iron, Prochlorperazine edisylate, and Trazodone    Scheduled Meds:[Held by provider] carvedilol, 6.25 mg, Oral, BID  DULoxetine, 90 mg, Oral, QAM  mirtazapine, 15 mg, Oral, Nightly  pantoprazole, 40 mg, Oral, Daily  sodium chloride, 10 mL, Intravenous, Q12H      Continuous Infusions:   PRN Meds:.  melatonin    ondansetron    sodium chloride    sodium chloride    Objective     VITAL SIGNS  Vitals:    01/26/25 0114 01/26/25 0318 01/26/25 0717 01/26/25 0755   BP: 105/60 99/55 (!) 76/47 102/60   BP Location:  Right arm Left arm    Patient Position:  Lying Lying    Pulse: 68 70 65 75   Resp:  19 13    Temp:  97.7 °F (36.5 °C) 98.4 °F (36.9 °C)    TempSrc:  Oral Oral    SpO2:  98% 98%    Weight:       Height:           Flowsheet Rows      Flowsheet Row First Filed Value   Admission Height 152.4 cm (60\") Documented at 01/25/2025 1831   Admission Weight 80 kg (176 lb 5.9 oz) Documented at 01/25/2025 1831            No intake or output data in the 24 hours ending 01/26/25 0957     TELEMETRY:    Physical Exam:  The patient is alert, oriented and in no distress.  Vital signs as noted above.  Head and neck revealed no carotid bruits or jugular venous distention.  No thyromegaly or lymphadenopathy is present  Lungs clear.  No wheezing.  Breath sounds are normal bilaterally.  Heart normal first and second heart sounds. No murmur.  No precordial rub is present.  No gallop is present.  Abdomen soft and nontender.  No organomegaly is present.  Extremities with good peripheral pulses without any pedal edema.  Skin warm and dry.  Musculoskeletal system is grossly normal  CNS grossly normal    Reviewed and updated.    Results Review:   I reviewed the patient's new clinical results.  Lab Results (last 24 hours)       " Procedure Component Value Units Date/Time    BNP [[id number]]  (Normal) Collected: 01/26/25 0340    Specimen: Blood Updated: 01/26/25 0802     proBNP 45.2 pg/mL     Narrative:      This assay is used as an aid in the diagnosis of individuals suspected of having heart failure. It can be used as an aid in the diagnosis of acute decompensated heart failure (ADHF) in patients presenting with signs and symptoms of ADHF to the emergency department (ED). In addition, NT-proBNP of <300 pg/mL indicates ADHF is not likely.    Age Range Result Interpretation  NT-proBNP Concentration (pg/mL:      <50             Positive            >450                   Gray                 300-450                    Negative             <300    50-75           Positive            >900                  Gray                300-900                  Negative            <300      >75             Positive            >1800                  Gray                300-1800                  Negative            <300    Lipid Panel [[id number]] Collected: 01/26/25 0340    Specimen: Blood Updated: 01/26/25 0548     Total Cholesterol 137 mg/dL      Triglycerides 144 mg/dL      HDL Cholesterol 55 mg/dL      LDL Cholesterol  57 mg/dL      VLDL Cholesterol 25 mg/dL      LDL/HDL Ratio 0.97    Narrative:      Cholesterol Reference Ranges  (U.S. Department of Health and Human Services ATP III Classifications)    Desirable          <200 mg/dL  Borderline High    200-239 mg/dL  High Risk          >240 mg/dL      Triglyceride Reference Ranges  (U.S. Department of Health and Human Services ATP III Classifications)    Normal           <150 mg/dL  Borderline High  150-199 mg/dL  High             200-499 mg/dL  Very High        >500 mg/dL    HDL Reference Ranges  (U.S. Department of Health and Human Services ATP III Classifications)    Low     <40 mg/dl (major risk factor for CHD)  High    >60 mg/dl ('negative' risk factor for CHD)        LDL Reference Ranges  (U.S.  Department of Health and Human Services ATP III Classifications)    Optimal          <100 mg/dL  Near Optimal     100-129 mg/dL  Borderline High  130-159 mg/dL  High             160-189 mg/dL  Very High        >189 mg/dL    LDL is calculated using the NIH LDL-C calculation.      High Sensitivity Troponin T [[id number]]  (Normal) Collected: 01/26/25 0340    Specimen: Blood Updated: 01/26/25 0548     HS Troponin T 8 ng/L     Narrative:      High Sensitive Troponin T Reference Range:  <14.0 ng/L- Negative Female for AMI  <22.0 ng/L- Negative Male for AMI  >=14 - Abnormal Female indicating possible myocardial injury.  >=22 - Abnormal Male indicating possible myocardial injury.   Clinicians would have to utilize clinical acumen, EKG, Troponin, and serial changes to determine if it is an Acute Myocardial Infarction or myocardial injury due to an underlying chronic condition.         High Sensitivity Troponin T 1Hr [[id number]]  (Abnormal) Collected: 01/25/25 1951    Specimen: Blood from Arm, Left Updated: 01/25/25 2018     HS Troponin T 16 ng/L      Troponin T Numeric Delta 10 ng/L     Narrative:      High Sensitive Troponin T Reference Range:  <14.0 ng/L- Negative Female for AMI  <22.0 ng/L- Negative Male for AMI  >=14 - Abnormal Female indicating possible myocardial injury.  >=22 - Abnormal Male indicating possible myocardial injury.   Clinicians would have to utilize clinical acumen, EKG, Troponin, and serial changes to determine if it is an Acute Myocardial Infarction or myocardial injury due to an underlying chronic condition.         Extra Tubes [[id number]] Collected: 01/25/25 1856    Specimen: Blood from Arm, Left Updated: 01/25/25 1931    Narrative:      The following orders were created for panel order Extra Tubes.  Procedure                               Abnormality         Status                     ---------                               -----------         ------                     Gold Top - SST[[id number]]                                    Final result                 Please view results for these tests on the individual orders.    Gold Top - SST [[id number]] Collected: 01/25/25 1856    Specimen: Blood from Arm, Left Updated: 01/25/25 1931     Extra Tube Hold for add-ons.     Comment: Auto resulted.       Comprehensive Metabolic Panel [[id number]] Collected: 01/25/25 1856    Specimen: Blood from Arm, Left Updated: 01/25/25 1928     Glucose 96 mg/dL      BUN 17 mg/dL      Creatinine 0.89 mg/dL      Sodium 140 mmol/L      Potassium 4.8 mmol/L      Comment: Slight hemolysis detected by analyzer. Result may be falsely elevated.        Chloride 105 mmol/L      CO2 24.3 mmol/L      Calcium 9.4 mg/dL      Total Protein 6.8 g/dL      Albumin 4.4 g/dL      ALT (SGPT) 19 U/L      AST (SGOT) 28 U/L      Comment: Slight hemolysis detected by analyzer. Result may be falsely elevated.        Alkaline Phosphatase 109 U/L      Total Bilirubin <0.2 mg/dL      Globulin 2.4 gm/dL      A/G Ratio 1.8 g/dL      BUN/Creatinine Ratio 19.1     Anion Gap 10.7 mmol/L      eGFR 73.0 mL/min/1.73     Narrative:      GFR Categories in Chronic Kidney Disease (CKD)      GFR Category          GFR (mL/min/1.73)    Interpretation  G1                     90 or greater         Normal or high (1)  G2                      60-89                Mild decrease (1)  G3a                   45-59                Mild to moderate decrease  G3b                   30-44                Moderate to severe decrease  G4                    15-29                Severe decrease  G5                    14 or less           Kidney failure          (1)In the absence of evidence of kidney disease, neither GFR category G1 or G2 fulfill the criteria for CKD.    eGFR calculation 2021 CKD-EPI creatinine equation, which does not include race as a factor    High Sensitivity Troponin T [[id number]]  (Normal) Collected: 01/25/25 1856    Specimen: Blood from Arm, Left Updated: 01/25/25 1920      "HS Troponin T 6 ng/L     Narrative:      High Sensitive Troponin T Reference Range:  <14.0 ng/L- Negative Female for AMI  <22.0 ng/L- Negative Male for AMI  >=14 - Abnormal Female indicating possible myocardial injury.  >=22 - Abnormal Male indicating possible myocardial injury.   Clinicians would have to utilize clinical acumen, EKG, Troponin, and serial changes to determine if it is an Acute Myocardial Infarction or myocardial injury due to an underlying chronic condition.         D-dimer, Quantitative [[id number]]  (Normal) Collected: 01/25/25 1856    Specimen: Blood from Arm, Left Updated: 01/25/25 1914     D-Dimer, Quantitative 0.29 MCGFEU/mL     Narrative:      According to the assay 's published package insert, a normal (<0.50 MCGFEU/mL) D-dimer result in conjunction with a non-high clinical probability assessment, excludes deep vein thrombosis (DVT) and pulmonary embolism (PE) with high sensitivity.    D-dimer values increase with age and this can make VTE exclusion of an older population difficult. To address this, the [employer], based on best available evidence and recent guidelines, recommends that clinicians use age-adjusted D-dimer thresholds in patients greater than 50 years of age with: a) a low probability of PE who do not meet all Pulmonary Embolism Rule Out Criteria, or b) in those with intermediate probability of PE.   The formula for an age-adjusted D-dimer cut-off is \"age/100\".  For example, a 60 year old patient would have an age-adjusted cut-off of 0.60 MCGFEU/mL and an 80 year old 0.80 MCGFEU/mL.    CBC & Differential [[id number]]  (Abnormal) Collected: 01/25/25 1856    Specimen: Blood from Arm, Left Updated: 01/25/25 1902    Narrative:      The following orders were created for panel order CBC & Differential.  Procedure                               Abnormality         Status                     ---------                               -----------         ------ "                     CBC Auto Differential[[id number]]        Abnormal            Final result                 Please view results for these tests on the individual orders.    CBC Auto Differential [[id number]]  (Abnormal) Collected: 01/25/25 1856    Specimen: Blood from Arm, Left Updated: 01/25/25 1902     WBC 9.10 10*3/mm3      RBC 3.62 10*6/mm3      Hemoglobin 10.3 g/dL      Hematocrit 32.0 %      MCV 88.4 fL      MCH 28.5 pg      MCHC 32.2 g/dL      RDW 14.6 %      RDW-SD 47.7 fl      MPV 10.0 fL      Platelets 322 10*3/mm3      Neutrophil % 64.9 %      Lymphocyte % 28.2 %      Monocyte % 5.2 %      Eosinophil % 1.3 %      Basophil % 0.2 %      Immature Grans % 0.2 %      Neutrophils, Absolute 5.90 10*3/mm3      Lymphocytes, Absolute 2.57 10*3/mm3      Monocytes, Absolute 0.47 10*3/mm3      Eosinophils, Absolute 0.12 10*3/mm3      Basophils, Absolute 0.02 10*3/mm3      Immature Grans, Absolute 0.02 10*3/mm3      nRBC 0.0 /100 WBC             Imaging Results (Last 24 Hours)       Procedure Component Value Units Date/Time    CT Angiogram Chest Pulmonary Embolism [[id number]] Collected: 01/25/25 2124     Updated: 01/25/25 2136    Narrative:      CT ANGIOGRAM CHEST PULMONARY EMBOLISM    Date of Exam: 1/25/2025 8:31 PM EST    Indication: Severe chest pain.    Comparison: None available.    Technique: Axial CT images were obtained of the chest after the uneventful intravenous administration of iodinated contrast utilizing pulmonary embolism protocol.  In addition, a 3-D volume rendered image was created for interpretation.  Sagittal and   coronal reconstructions were performed.  Automated exposure control and iterative reconstruction methods were used.      Findings:    Pulmonary arteries: Adequate opacification of the pulmonary arteries. No evidence of acute pulmonary embolism.    Lungs and Pleura: There are no focal infiltrates. There is mild bilateral basilar atelectasis. There are no suspicious pulmonary nodules.  There is no pleural effusion.    Mediastinum/Krysten: No mediastinal or hilar lymphadenopathy.    Lymph nodes: No axillary or supraclavicular adenopathy.    Cardiovascular: The cardiac chambers are within normal limits. The pericardium is normal. The aorta and its arch branch vessels are unremarkable.       Upper Abdomen: There is a moderate size hiatal hernia. Otherwise, the upper abdominal contents are unremarkable.          Bones and Soft Tissue: No suspicious osseous lesion.        Impression:      Impression:  1.No evidence of pulmonary embolism.  2.Moderate size hiatal hernia.  3.Mild bilateral basilar atelectasis.            Electronically Signed: Manny Sanchez MD    1/25/2025 9:34 PM EST    Workstation ID: [id number]    XR Chest 1 View [[id number]] Collected: 01/25/25 1919     Updated: 01/25/25 1922    Narrative:      XR CHEST 1 VW    Date of Exam: 1/25/2025 7:00 PM EST    Indication: chest pain    Comparison: None available.    Findings:  The lungs are clear. Cardiac, hilar, and mediastinal silhouettes are within normal limits. There is an air-filled retrocardiac density compatible with a moderate hiatal hernia. No pneumothorax or pleural effusions. The trachea is midline. Pulmonary   vascularity is normal. Visualized bony structures are intact.        Impression:      Impression:  Moderate hiatal hernia. No active cardiopulmonary disease.        Electronically Signed: Sanjiv Cintron DO    1/25/2025 7:20 PM EST    Workstation ID: [id number]        LAB RESULTS (LAST 7 DAYS)    CBC  Results from last 7 days   Lab Units 01/25/25  1856   WBC 10*3/mm3 9.10   RBC 10*6/mm3 3.62*   HEMOGLOBIN g/dL 10.3*   HEMATOCRIT % 32.0*   MCV fL 88.4   PLATELETS 10*3/mm3 322       BMP  Results from last 7 days   Lab Units 01/25/25  1856   SODIUM mmol/L 140   POTASSIUM mmol/L 4.8   CHLORIDE mmol/L 105   CO2 mmol/L 24.3   BUN mg/dL 17   CREATININE mg/dL 0.89   GLUCOSE mg/dL 96       CMP   Results from last 7 days   Lab Units  01/25/25  1856   SODIUM mmol/L 140   POTASSIUM mmol/L 4.8   CHLORIDE mmol/L 105   CO2 mmol/L 24.3   BUN mg/dL 17   CREATININE mg/dL 0.89   GLUCOSE mg/dL 96   ALBUMIN g/dL 4.4   BILIRUBIN mg/dL <0.2   ALK PHOS U/L 109   AST (SGOT) U/L 28   ALT (SGPT) U/L 19         BNP        TROPONIN  Results from last 7 days   Lab Units 01/26/25  0340   HSTROP T ng/L 8       CoAg        Creatinine Clearance  Estimated Creatinine Clearance: 59.3 mL/min (by C-G formula based on SCr of 0.89 mg/dL).    ABG        Radiology  CT Angiogram Chest Pulmonary Embolism    Result Date: 1/25/2025  Impression: 1.No evidence of pulmonary embolism. 2.Moderate size hiatal hernia. 3.Mild bilateral basilar atelectasis. Electronically Signed: Manny Sanchez MD  1/25/2025 9:34 PM EST  Workstation ID: [id number]    XR Chest 1 View    Result Date: 1/25/2025  Impression: Moderate hiatal hernia. No active cardiopulmonary disease. Electronically Signed: Sanjiv Cintron DO  1/25/2025 7:20 PM EST  Workstation ID: [id number]       EKG            I personally viewed and interpreted the patient's EKG/Telemetry data:    ECHOCARDIOGRAM:    Results for orders placed in visit on 04/10/24    Adult Transthoracic Echo Complete W/ Cont if Necessary Per Protocol    Interpretation Summary    Left ventricular ejection fraction appears to be 51 - 55%.    Left ventricular diastolic function is consistent with (grade I) impaired relaxation.  GLS -13.8%.    Estimated right ventricular systolic pressure from tricuspid regurgitation is normal (<35 mmHg).    No significant valvular abnormalities noted.      STRESS TEST  Results for orders placed during the hospital encounter of 05/26/23    Stress Test With Myocardial Perfusion One Day    Interpretation Summary    Findings consistent with a normal ECG stress test.    Left ventricular ejection fraction is hyperdynamic (Calculated EF > 70%).    Myocardial perfusion imaging indicates a small-sized, moderately severe area of ischemia located  "in the lateral wall.    Impressions are consistent with an intermediate risk study.      The following was reviewed from primary cardiologist.    \"Cardiolite (Tc-99m sestamibi) stress test    HEART CATHETERIZATION  Results for orders placed during the hospital encounter of 05/26/23    Cardiac Catheterization/Vascular Study    Conclusion  OPERATORS:  1. Jack Christensen M.D., Attending Cardiologist      PROCEDURE PERFORMED.  Ultrasound guided vascular access  Left heart catheterization  Coronary Angiogram 29272  Moderate Sedation    INDICATIONS FOR PROCEDURE.  63 years old woman presented with chest pain suspicious for unstable angina.  She was noted to have an abnormal nuclear stress test.  After discussing the risk and benefit of the procedure she was brought to the Cath Lab for definitive coronary angiography.    PROCEDURE IN DETAIL.  Informed consent was obtained from the patient after explaining the risks, benefits, and alternative options of the procedure. After obtaining informed consent, the patient was brought to the cath lab and was prepped in a sterile fashion. Lidocaine 1% was used for local anesthesia into the right radial access site. The right radial artery was accessed under direct ultrasound visualization  with an angiocath needle via modified Seldinger technique. A 6F slender sheath was inserted successfully. Afterwards, 6F JR4  was advanced over a wire into the ascending aorta and used to cross the AV and obtain LV pressures.  AV gradient obtained via pullback technique. JR4 and JL3.5 diagnostic catheters were used to engage the ostia of the RCA and LM respectively. Images of the right and left coronary systems were obtained. All the catheters were exchanged over a wire and subsequently removed. The patient tolerated the procedure well without any complications. The pictures were reviewed at the end of the procedure. TR band applied to right wrist for hemostasis and inflated with 15 cc of air. No " complications were encountered.    HEMODYNAMICS.  LV: 102/6, 12 mmHg  AO: 103/72, 84 mmHg  No significant gradient across the aortic valve during pullback of JR4 catheter.  LV gram was not performed due to recent echocardiogram.    FINDINGS.    Coronary Angiogram.    Left dominant coronary system    1. Left main. Left main is a large-caliber vessel which gives rise to the Left Anterior Descending and the Left circumflex.  Left main is angiographically free from any significant disease    2. Left Anterior Descending Artery. LAD is a large vessel which gives rise to several septal perforators and several diagonal branches. It is angiographically free from any significant disease    3. Left Circumflex. The LCx is a dominant large caliber caliber which gives rise to marginals.  It also gives rise to the PDA.  Left circumflex artery is angiographically free from any significant disease    4. Right Coronary Artery. The RCA is a nondominant small vessel.    IMPRESSIONS.  1. Non-obstructive CAD  2.  Normal LVEDP    RECOMMENDATIONS.  1. Continue aggressive risk factor modification for primary prevention.  2. Exercise and lifestyle modifications recommended.      OTHER:     Assessment & Plan     Principal Problem:    Chest pain    Coronary Angiogram.     Left dominant coronary system     1. Left main. Left main is a large-caliber vessel which gives rise to the Left Anterior Descending and the Left circumflex.  Left main is angiographically free from any significant disease     2. Left Anterior Descending Artery. LAD is a large vessel which gives rise to several septal perforators and several diagonal branches. It is angiographically free from any significant disease     3. Left Circumflex. The LCx is a dominant large caliber caliber which gives rise to marginals.  It also gives rise to the PDA.  Left circumflex artery is angiographically free from any significant disease     4. Right Coronary Artery. The RCA is a nondominant  "small vessel.     IMPRESSIONS.  1. Non-obstructive CAD  2.  Normal LVEDP     RECOMMENDATIONS.  1. Continue aggressive risk factor modification for primary prevention.  2. Exercise and lifestyle modifications recommended.              Assessment and Plan         Diagnoses and all orders for this visit:     1. Primary hypertension (Primary)     2. Abnormal nuclear stress test  Overview:  Added automatically from request for surgery [id number]        3. RALPH (dyspnea on exertion)     4. Mixed hyperlipidemia     5. Chest pain, unspecified type     6. BEBETO on CPAP     7. Diabetes mellitus due to underlying condition with hyperglycemia, without long-term current use of insulin \"      Chest pain  64 years old woman presents with chest pain concerning for unstable angina.  ECG showed nonspecific ST-T wave changes in the lateral leads.  Troponin was negative but Nuclear stress test showed small sized moderately severe ischemia located in the lateral wall.  Cardiac cath showed nonobstructive coronary disease.  Normal LVEDP  False positive nuclear stress test due to left dominant coronary system and body habitus.  Consider noncardiac etiology for chest pain.  Reassurance provided to the patient      Shortness of breath  Unclear etiology  Echocardiogram shows preserved LV function, grade 1 diastolic dysfunction  No significant valvular abnormalities noted  Likely secondary to obesity, sleep apnea  Excessive sweating and palpitations are likely due to deconditioning.  I have encouraged her to walk every day and continue to lose weight.     Hypertension  Continue Coreg.  Blood pressure is well-controlled.     Hyperlipidemia  , HDL 71, triglyceride 124, total cholesterol 234  Goal LDL less than 100.  LDL 69, HDL 71, triglyceride 149 and total cholesterol 165.  A1c 6.1  Continue Leqvio     Prediabetes  A1c is 6.1  Diet, exercise, weight loss discussed with the patient  Also discussed the possibility of starting metformin.   " "  Obesity  BMI is 34.45.  She weighs 176 pounds.  She has lost 20 pounds  She could not tolerate Wegovy  Screening and treatment for sleep apnea  Diet, exercise, weight loss and lifestyle modifications discussed with the patient in details.     Anxiety/depression  Currently on Cymbalta.\"       ]]]]]]]]]]]]]]]]]]]]]]]]] 1/26/2025  Chest pain  Troponin 6--16--8  proBNP 45.2  D-dimer negative  Previous echocardiogram with LVEF 51 to 55%, grade 1 diastolic dysfunction  Obtain Myoview study  Recommend low-dose aspirin    Dyslipidemia  Leqvio  LDL 57    Prediabetes  Prior A1c 6.10  Obtain A1c    Obesity  BMI 33.2  Diet lifestyle modifications discussed    Hypertension  Continue carvedilol    Further recommendations and assessment per Dr. Quintana     Electronically signed by CHE Solares, 01/26/25, 9:56 AM EST.    ]]]]]]]]]]]]]]]]]]]]]]    The patient was seen around 2 PM today.  Chart was reviewed in entirety.  Reviewed and agree with the assessment and plan as documented by nurse practitioner Diana.  Daily update was performed by me.    Patient presented with chest discomfort which is concerning for angina pectoris.  Patient has multiple coronary risk factors.    Cardiolite test 1/26/2025  Lexiscan Cardiolite test showed mild proximal inferior ischemia.  Gated SPECT images revealed normal left ventricular size and contractility with ejection fraction of 73%.    Patient had cardiac cath in 2023.  Reviewed films.  Patient did not have any obstructive coronary artery disease.    Will observe.  Patient may need repeat cardiac catheterization.  Will discuss with Dr. Christensen, primary cardiologist.    CHE Solares  01/26/25  09:57 EST    .b            "

## 2025-01-26 NOTE — H&P
[facility] H&P    Patient Name: Gina Wright  : 1960  MRN: [id number]  Primary Care Physician: Amilcar Cross MD  Date of admission: 2025     Patient Care Team:  Amilcar Cross MD as PCP - General (Family Medicine)  Benjamin Munguia MD as Consulting Physician (General Surgery)  Jack Christensen MD as Cardiologist (Cardiology)          Subjective   History Present Illness     Chief Complaint:   Chief Complaint   Patient presents with    Chest Pain     Chest pain    Chest Pain   Associated symptoms include palpitations and shortness of breath.       ED  65-year-old female complaining of chest pain associated with shortness of breath and decreased exercise tolerance over the last 48 hours. The patient reports no fever chills or cough. She states that she has had occasional palpitations and she does not think that is necessarily been worse. The patient reports that she has had no night sweats or hemoptysis.     Observation 25  Pt concurs with er hpi. Cardiology consulting.       Review of Systems   Cardiovascular:  Positive for chest pain and palpitations.   Respiratory:  Positive for shortness of breath.              Personal History     Past Medical History:   Past Medical History:   Diagnosis Date    Anesthesia complication     pt states she needs extra d/t red hair    Arthritis     Depressive disorder     Enlarged heart     Hyperlipidemia     Hypothyroid     Migraine     MRSA infection     on hands    Obstructive sleep apnea     PONV (postoperative nausea and vomiting)     Prediabetes     Tachycardia        Surgical History:      Past Surgical History:   Procedure Laterality Date    BACK SURGERY      CARDIAC CATHETERIZATION Right 2023    Procedure: Left Heart Cath and coronary angiogram;  Surgeon: Jack Christensen MD;  Location: [facility] CATH INVASIVE LOC[facility];  Service: Cardiovascular;  Laterality: Right;  5 Fr catheters    GALLBLADDER SURGERY      HYSTERECTOMY      MASS EXCISION  Right 05/11/2022    Procedure: LIPOMA EXCISION from right shoulder;  Surgeon: Benjamin Munguia MD;  Location: [facility] MAIN OR;  Service: General;  Laterality: Right;    NASAL SEPTUM SURGERY      TONSILLECTOMY             Family History: family history includes Heart disease in her father; Mental illness in her father; Stroke in her mother. Otherwise pertinent FHx was reviewed and unremarkable.     Social History:  reports that she has never smoked. She has been exposed to tobacco smoke. She has never used smokeless tobacco. She reports that she does not currently use alcohol. She reports that she does not currently use drugs.      Medications:  Prior to Admission medications    Medication Sig Start Date End Date Taking? Authorizing Provider   carvedilol (COREG) 6.25 MG tablet TAKE 1 TABLET BY MOUTH 2 (TWO) TIMES A DAY. 9/5/24  Yes Jack Christensen MD   DULoxetine (CYMBALTA) 30 MG capsule Take 1 capsule by mouth Every Morning. 3/7/22  Yes Pernell Rivera MD   DULoxetine (CYMBALTA) 60 MG capsule Take 1 capsule by mouth Daily. Take along with Duloxetine 30 mg to equal 90 mg daily dose.   Yes ProviderPernell MD   Inclisiran Sodium (LEQVIO SC) Inject  under the skin into the appropriate area as directed Every 6 (Six) Months.   Yes Pernell Rivera MD   ipratropium (ATROVENT) 0.03 % nasal spray Administer 2 sprays into the nostril(s) as directed by provider 2 (Two) Times a Day. 1/24/25  Yes Pernell Rivera MD   meloxicam (MOBIC) 15 MG tablet Take 1 tablet by mouth Daily.   Yes Pernell Rivera MD   mirtazapine (REMERON) 15 MG tablet Take 1 tablet by mouth Every Night. 4/4/24  Yes Pernell Rivera MD   pantoprazole (PROTONIX) 40 MG EC tablet Take 1 tablet by mouth Daily. 5/27/23  Yes Edilia Miranda APRN   Semaglutide-Weight Management 0.25 MG/0.5ML solution auto-injector Inject 0.5 mL under the skin into the appropriate area as directed Every 7 (Seven) Days. Sundays.   Yes Pernell Rivera  MD       Allergies:    Allergies   Allergen Reactions    Ferumoxytol Anaphylaxis     Within 8 mins of infusion patient flushed including arms, diaphoretic, nauseous, chest and back pain.     Compazine [Prochlorperazine] Anxiety    Sulfa Antibiotics Other (See Comments)     Feels like I'm going to die.  Internal pain.    Atorvastatin Myalgia    Ciprofloxacin Rash     CAUSES AGITATION    Crestor [Rosuvastatin] Myalgia    Egg-Derived Products Nausea Only    Iron Other (See Comments)     Flu like symptoms    Prochlorperazine Edisylate Other (See Comments)     Jitters // Zofran    Trazodone Other (See Comments)     Unable to sleep       Objective   Objective     Vital Signs  Temp:  [97.6 °F (36.4 °C)-98.8 °F (37.1 °C)] 98.8 °F (37.1 °C)  Heart Rate:  [65-89] 88  Resp:  [13-21] 17  BP: ()/(47-79) 112/68  SpO2:  [93 %-99 %] 93 %  on  Flow (L/min) (Oxygen Therapy):  [3] 3;   Device (Oxygen Therapy): room air  Body mass index is 33.2 kg/m².    Physical Exam  Constitutional:       Appearance: Normal appearance.   Cardiovascular:      Rate and Rhythm: Normal rate and regular rhythm.   Pulmonary:      Effort: Pulmonary effort is normal.      Breath sounds: Normal breath sounds.   Neurological:      General: No focal deficit present.      Mental Status: She is alert and oriented to person, place, and time. Mental status is at baseline.   Psychiatric:         Mood and Affect: Mood normal.         Behavior: Behavior normal.         Results Review:  I have personally reviewed most recent cardiac tracings, lab results, and radiology images and interpretations and agree with findings, most notably: cbc, cmp, ddimer, troponin, bnp, lipid panel, chest xray, cta chest, ekg.    Results from last 7 days   Lab Units 01/25/25  1856   WBC 10*3/mm3 9.10   HEMOGLOBIN g/dL 10.3*   HEMATOCRIT % 32.0*   PLATELETS 10*3/mm3 322     Results from last 7 days   Lab Units 01/26/25  0340 01/25/25 1951 01/25/25  1856   SODIUM mmol/L  --   --  140    POTASSIUM mmol/L  --   --  4.8   CHLORIDE mmol/L  --   --  105   CO2 mmol/L  --   --  24.3   BUN mg/dL  --   --  17   CREATININE mg/dL  --   --  0.89   GLUCOSE mg/dL  --   --  96   CALCIUM mg/dL  --   --  9.4   ALK PHOS U/L  --   --  109   ALT (SGPT) U/L  --   --  19   AST (SGOT) U/L  --   --  28   HSTROP T ng/L 8 16* 6   PROBNP pg/mL 45.2  --   --      Estimated Creatinine Clearance: 59.3 mL/min (by C-G formula based on SCr of 0.89 mg/dL).  Brief Urine Lab Results       None            Microbiology Results (last 10 days)       ** No results found for the last 240 hours. **            ECG/EMG Results (most recent)       Procedure Component Value Units Date/Time    ECG 12 Lead Chest Pain [[id number]] Collected: 01/25/25 1841     Updated: 01/26/25 1307     QT Interval 400 ms      QTC Interval 465 ms     Narrative:      HEART RATE=81  bpm  RR Bnfthppy=968  ms  OH Lcyrskiq=354  ms  P Horizontal Axis=8  deg  P Front Axis=22  deg  QRSD Caztuksd=949  ms  QT Rnxwmuys=278  ms  ZKnI=199  ms  QRS Axis=-8  deg  T Wave Axis=39  deg  - BORDERLINE ECG -  Sinus rhythm  Low voltage, precordial leads  Consider  anterior infarct  When compared with ECG of 17-Jun-2024 12:48:24,  Significant repolarization change  Electronically Signed By: Jose Gallardo (Walalce) 2025-01-26 13:07:02  Date and Time of Study:2025-01-25 18:41:27    ECG 12 Lead Chest Pain [[id number]] Collected: 01/25/25 2107     Updated: 01/26/25 1308     QT Interval 404 ms      QTC Interval 467 ms     Narrative:      HEART RATE=80  bpm  RR Xatwdybf=292  ms  OH Jbcgwbmk=981  ms  P Horizontal Axis=3  deg  P Front Axis=41  deg  QRSD Yisqeodt=896  ms  QT Lffcrpci=119  ms  LWoA=522  ms  QRS Axis=-16  deg  T Wave Axis=66  deg  - ABNORMAL ECG -  Sinus rhythm  Low voltage, precordial leads  Anteroseptal  infarct, old  When compared with ECG of 25-Jan-2025 18:41:27,  No significant change  Electronically Signed By: Jose Gallardo (Wallace) 2025-01-26 13:07:47  Date and Time of  Study:2025-01-25 21:07:43                Results for orders placed in visit on 04/10/24    Adult Transthoracic Echo Complete W/ Cont if Necessary Per Protocol    Interpretation Summary    Left ventricular ejection fraction appears to be 51 - 55%.    Left ventricular diastolic function is consistent with (grade I) impaired relaxation.  GLS -13.8%.    Estimated right ventricular systolic pressure from tricuspid regurgitation is normal (<35 mmHg).    No significant valvular abnormalities noted.      CT Angiogram Chest Pulmonary Embolism    Result Date: 1/25/2025  Impression: 1.No evidence of pulmonary embolism. 2.Moderate size hiatal hernia. 3.Mild bilateral basilar atelectasis. Electronically Signed: Manny Sanchez MD  1/25/2025 9:34 PM EST  Workstation ID: [id number]    XR Chest 1 View    Result Date: 1/25/2025  Impression: Moderate hiatal hernia. No active cardiopulmonary disease. Electronically Signed: Sanjiv Cintron DO  1/25/2025 7:20 PM EST  Workstation ID: [id number]       Estimated Creatinine Clearance: 59.3 mL/min (by C-G formula based on SCr of 0.89 mg/dL).    Assessment & Plan   Assessment/Plan       Active Hospital Problems    Diagnosis  POA    **Chest pain [R07.9]  Yes      Resolved Hospital Problems   No resolved problems to display.     Chest pain  Lab Results   Component Value Date    TROPONINT 8 01/26/2025    TROPONINT 16 (H) 01/25/2025    TROPONINT 6 01/25/2025     -cbc, cmp, ddimer, lipid panel, bnp unremarkable  -Chest X-ray:reviewed and showing sherrie cute process, hiatal hernia  -EKG:rate 80 sinus  - cta chest no pe. Mild basilar atelectasis  -Stress Test performed and showed mild proximal inferior ischemia   - cardiology consulted  -Telemetry    Hypertension  -well Controlled   BP Readings from Last 1 Encounters:   01/26/25 112/68     - Continue coreg  - Monitor while admitted           VTE Prophylaxis - Documented VTE Prophylaxis Not Indicated  Reason:        Start        01/25/25 2229  VTE Prophylaxis  Not Indicated: Low Risk; Obesity - BMI >30 (1)  Once                              CODE STATUS:    There are no questions and answers to display.       This patient has been examined wearing personal protective equipment.     I discussed the patient's findings and my recommendations with patient and nursing staff.      Signature:Electronically signed by Oriana Garcia PA-C, 01/26/25, 5:06 PM EST.

## 2025-01-26 NOTE — ED PROVIDER NOTES
Subjective   History of Present Illness  65-year-old female complaining of chest pain associated with shortness of breath and decreased exercise tolerance over the last 48 hours.  The patient reports no fever chills or cough.  She states that she has had occasional palpitations and she does not think that is necessarily been worse.  The patient reports that she has had no night sweats or hemoptysis.  The patient describes the chest pain as heavy and oppressive  Review of Systems   Respiratory:  Positive for chest tightness and shortness of breath.    Cardiovascular:  Positive for chest pain. Negative for palpitations and leg swelling.       Past Medical History:   Diagnosis Date    Anesthesia complication     pt states she needs extra d/t red hair    Arthritis     Depressive disorder     Enlarged heart     Hyperlipidemia     Hypothyroid     Migraine     MRSA infection     on hands    Obstructive sleep apnea     PONV (postoperative nausea and vomiting)     Prediabetes     Tachycardia        Allergies   Allergen Reactions    Ferumoxytol Anaphylaxis     Within 8 mins of infusion patient flushed including arms, diaphoretic, nauseous, chest and back pain.     Compazine [Prochlorperazine] Anxiety    Sulfa Antibiotics Other (See Comments)     Feels like I'm going to die.  Internal pain.    Atorvastatin Myalgia    Ciprofloxacin Rash     CAUSES AGITATION    Crestor [Rosuvastatin] Myalgia    Egg-Derived Products Nausea Only    Iron Other (See Comments)     Flu like symptoms    Prochlorperazine Edisylate Other (See Comments)     Jitters // Zofran    Trazodone Other (See Comments)     Unable to sleep   Patient had cardiac catheterization done on 27 May and 2023 no significant disease was identified at that time the patient has not had cardiac testing since that    Past Surgical History:   Procedure Laterality Date    BACK SURGERY      CARDIAC CATHETERIZATION Right 05/27/2023    Procedure: Left Heart Cath and coronary angiogram;   Surgeon: Jack Christensen MD;  Location: [facility] CATH INVASIVE LOCATION;  Service: Cardiovascular;  Laterality: Right;  5 Fr catheters    GALLBLADDER SURGERY      HYSTERECTOMY      MASS EXCISION Right 05/11/2022    Procedure: LIPOMA EXCISION from right shoulder;  Surgeon: Benjamin Munguai MD;  Location: [facility] MAIN OR;  Service: General;  Laterality: Right;    NASAL SEPTUM SURGERY      TONSILLECTOMY         Family History   Problem Relation Age of Onset    Stroke Mother     Mental illness Father     Heart disease Father     Sleep apnea Neg Hx        Social History     Socioeconomic History    Marital status:     Number of children: 2    Years of education: 14   Tobacco Use    Smoking status: Never     Passive exposure: Past    Smokeless tobacco: Never   Vaping Use    Vaping status: Never Used   Substance and Sexual Activity    Alcohol use: Not Currently    Drug use: Not Currently    Sexual activity: Defer           Objective   Physical Exam  Alert [place] Coma Scale 15   HEENT: Pupils equal and reactive to light. Conjunctivae are not injected. Normal tympanic membranes. Oropharynx and nares are normal.   Neck: Supple. Midline trachea. No JVD. No goiter.   Chest: Clear and equal breath sounds bilaterally, regular rate and rhythm without murmur or rub.  Nontender chest wall no S3 or S4 no ventricular ectopy   Abdomen: Positive bowel sounds, nontender, nondistended. No rebound or peritoneal signs. No CVA tenderness.   Extremities no clubbing. cyanosis or edema. Motor sensory exam is normal. The full range of motion is intact   Skin: Warm and dry, no rashes or petechia.   Lymphatic: No regional lymphadenopathy. No calf pain, swelling or Homans sign    Procedures           ED Course      Labs Reviewed   CBC WITH AUTO DIFFERENTIAL - Abnormal; Notable for the following components:       Result Value    RBC 3.62 (*)     Hemoglobin 10.3 (*)     Hematocrit 32.0 (*)     All other components within normal limits   HIGH  "SENSITIVITIY TROPONIN T 1HR - Abnormal; Notable for the following components:    HS Troponin T 16 (*)     Troponin T Numeric Delta 10 (*)     All other components within normal limits    Narrative:     High Sensitive Troponin T Reference Range:  <14.0 ng/L- Negative Female for AMI  <22.0 ng/L- Negative Male for AMI  >=14 - Abnormal Female indicating possible myocardial injury.  >=22 - Abnormal Male indicating possible myocardial injury.   Clinicians would have to utilize clinical acumen, EKG, Troponin, and serial changes to determine if it is an Acute Myocardial Infarction or myocardial injury due to an underlying chronic condition.        D-DIMER, QUANTITATIVE - Normal    Narrative:     According to the assay 's published package insert, a normal (<0.50 MCGFEU/mL) D-dimer result in conjunction with a non-high clinical probability assessment, excludes deep vein thrombosis (DVT) and pulmonary embolism (PE) with high sensitivity.    D-dimer values increase with age and this can make VTE exclusion of an older population difficult. To address this, the [employer], based on best available evidence and recent guidelines, recommends that clinicians use age-adjusted D-dimer thresholds in patients greater than 50 years of age with: a) a low probability of PE who do not meet all Pulmonary Embolism Rule Out Criteria, or b) in those with intermediate probability of PE.   The formula for an age-adjusted D-dimer cut-off is \"age/100\".  For example, a 60 year old patient would have an age-adjusted cut-off of 0.60 MCGFEU/mL and an 80 year old 0.80 MCGFEU/mL.   TROPONIN - Normal    Narrative:     High Sensitive Troponin T Reference Range:  <14.0 ng/L- Negative Female for AMI  <22.0 ng/L- Negative Male for AMI  >=14 - Abnormal Female indicating possible myocardial injury.  >=22 - Abnormal Male indicating possible myocardial injury.   Clinicians would have to utilize clinical acumen, EKG, Troponin, and " serial changes to determine if it is an Acute Myocardial Infarction or myocardial injury due to an underlying chronic condition.        COMPREHENSIVE METABOLIC PANEL    Narrative:     GFR Categories in Chronic Kidney Disease (CKD)      GFR Category          GFR (mL/min/1.73)    Interpretation  G1                     90 or greater         Normal or high (1)  G2                      60-89                Mild decrease (1)  G3a                   45-59                Mild to moderate decrease  G3b                   30-44                Moderate to severe decrease  G4                    15-29                Severe decrease  G5                    14 or less           Kidney failure          (1)In the absence of evidence of kidney disease, neither GFR category G1 or G2 fulfill the criteria for CKD.    eGFR calculation 2021 CKD-EPI creatinine equation, which does not include race as a factor   CBC AND DIFFERENTIAL    Narrative:     The following orders were created for panel order CBC & Differential.  Procedure                               Abnormality         Status                     ---------                               -----------         ------                     CBC Auto Differential[[id number]]        Abnormal            Final result                 Please view results for these tests on the individual orders.   EXTRA TUBES    Narrative:     The following orders were created for panel order Extra Tubes.  Procedure                               Abnormality         Status                     ---------                               -----------         ------                     Gold Top - SST[[id number]]                                   Final result                 Please view results for these tests on the individual orders.   GOLD TOP - SST     Medications   morphine injection 4 mg (4 mg Intravenous Given 1/25/25 1904)   ondansetron (ZOFRAN) injection 4 mg (4 mg Intravenous Given 1/25/25 1904)   iopamidol  (ISOVUE-370) 76 % injection 100 mL (100 mL Intravenous Given 1/25/25 2031)     CT Angiogram Chest Pulmonary Embolism    Result Date: 1/25/2025  Impression: 1.No evidence of pulmonary embolism. 2.Moderate size hiatal hernia. 3.Mild bilateral basilar atelectasis. Electronically Signed: Manny Sanchez MD  1/25/2025 9:34 PM EST  Workstation ID: [id number]    XR Chest 1 View    Result Date: 1/25/2025  Impression: Moderate hiatal hernia. No active cardiopulmonary disease. Electronically Signed: Sanjiv Cintron DO  1/25/2025 7:20 PM EST  Workstation ID: [id number]                                                    Medical Decision Making  The patient was given aspirin in the emergency department start on topical nitrates.  The patient will be placed in observational status in the morning and we will schedule the patient for stress Myoview.  The patient was agreeable to this plan of treatment    Amount and/or Complexity of Data Reviewed  Labs: ordered. Decision-making details documented in ED Course.  Radiology: ordered and independent interpretation performed.  ECG/medicine tests: ordered and independent interpretation performed.     Details: Sinus rhythm with low voltage anteriorly old anterior changes were noted with a comparison of 6/17/2024.  Nursing staff ordered a second EKG that was similar    Risk  OTC drugs.  Prescription drug management.  Decision regarding hospitalization.        Final diagnoses:   Hiatal hernia   Elevated troponin   Chest pain, unspecified type   Mild bibasilar atelectasis       ED Disposition  ED Disposition       ED Disposition   Decision to Admit    Condition   --    Comment   --               No follow-up provider specified.       Medication List      No changes were made to your prescriptions during this visit.            Jose Gallardo MD  01/25/25 5853

## 2025-01-27 VITALS
TEMPERATURE: 97.2 F | RESPIRATION RATE: 13 BRPM | BODY MASS INDEX: 33.38 KG/M2 | SYSTOLIC BLOOD PRESSURE: 125 MMHG | OXYGEN SATURATION: 95 % | HEART RATE: 77 BPM | WEIGHT: 170 LBS | HEIGHT: 60 IN | DIASTOLIC BLOOD PRESSURE: 72 MMHG

## 2025-01-27 LAB
ALBUMIN SERPL-MCNC: 4 G/DL (ref 3.5–5.2)
ALBUMIN/GLOB SERPL: 1.7 G/DL
ALP SERPL-CCNC: 97 U/L (ref 39–117)
ALT SERPL W P-5'-P-CCNC: 16 U/L (ref 1–33)
ANION GAP SERPL CALCULATED.3IONS-SCNC: 9.2 MMOL/L (ref 5–15)
AST SERPL-CCNC: 21 U/L (ref 1–32)
BASE DEFICIT: ABNORMAL
BASE EXCESS BLDA CALC-SCNC: 0 MMOL/L (ref 0–3)
BASE EXCESS BLDA CALC-SCNC: <0 MMOL/L (ref 0–3)
BASE EXCESS BLDV CALC-SCNC: <0 MMOL/L (ref 0–3)
BILIRUB SERPL-MCNC: 0.2 MG/DL (ref 0–1.2)
BUN SERPL-MCNC: 15 MG/DL (ref 8–23)
BUN/CREAT SERPL: 17.9 (ref 7–25)
CA-I BLDA-SCNC: 1.24 MMOL/L (ref 1.12–1.32)
CA-I BLDA-SCNC: 1.25 MMOL/L (ref 1.12–1.32)
CA-I BLDA-SCNC: 1.27 MMOL/L (ref 1.12–1.32)
CALCIUM SPEC-SCNC: 9.3 MG/DL (ref 8.6–10.5)
CHLORIDE SERPL-SCNC: 108 MMOL/L (ref 98–107)
CO2 BLDA-SCNC: 24 MMOL/L (ref 23–27)
CO2 BLDA-SCNC: 26 MMOL/L (ref 23–27)
CO2 CONTENT VENOUS: 26 MMOL/L (ref 24–29)
CO2 SERPL-SCNC: 22.8 MMOL/L (ref 22–29)
CREAT SERPL-MCNC: 0.84 MG/DL (ref 0.57–1)
EGFRCR SERPLBLD CKD-EPI 2021: 78.2 ML/MIN/1.73
GLOBULIN UR ELPH-MCNC: 2.3 GM/DL
GLUCOSE BLDC GLUCOMTR-MCNC: 112 MG/DL (ref 70–105)
GLUCOSE BLDC GLUCOMTR-MCNC: 115 MG/DL (ref 70–105)
GLUCOSE BLDC GLUCOMTR-MCNC: 117 MG/DL (ref 70–105)
GLUCOSE SERPL-MCNC: 91 MG/DL (ref 65–99)
HCO3 BLDA-SCNC: 23.2 MMOL/L (ref 22–26)
HCO3 BLDA-SCNC: 24.9 MMOL/L (ref 22–26)
HCO3 BLDV-SCNC: 24.7 MMOL/L (ref 23–28)
HCT VFR BLDA CALC: 31 % (ref 38–51)
HCT VFR BLDA CALC: 32 % (ref 38–51)
HCT VFR BLDA CALC: 32 % (ref 38–51)
HGB BLDA-MCNC: 10.5 G/DL (ref 12–17)
HGB BLDA-MCNC: 10.9 G/DL (ref 12–17)
HGB BLDA-MCNC: 10.9 G/DL (ref 12–17)
PCO2 BLDA: 37.1 MM HG (ref 35–45)
PCO2 BLDA: 40.2 MM HG (ref 35–45)
PCO2 BLDV: 44.1 MM HG (ref 41–51)
PH BLDA: 7.4 PH UNITS (ref 7.35–7.45)
PH BLDA: 7.4 PH UNITS (ref 7.35–7.45)
PH BLDV: 7.36 PH UNITS (ref 7.31–7.41)
PO2 BLDA: 55 MM HG (ref 80–105)
PO2 BLDA: 64 MM HG (ref 80–105)
PO2 BLDV: 35 MM HG (ref 35–42)
POTASSIUM BLDA-SCNC: 4 MMOL/L (ref 3.5–4.9)
POTASSIUM BLDA-SCNC: 4.1 MMOL/L (ref 3.5–4.9)
POTASSIUM BLDA-SCNC: 4.1 MMOL/L (ref 3.5–4.9)
POTASSIUM SERPL-SCNC: 4.1 MMOL/L (ref 3.5–5.2)
PROT SERPL-MCNC: 6.3 G/DL (ref 6–8.5)
SAO2 % BLDCOA: 88 % (ref 95–98)
SAO2 % BLDCOA: 92 % (ref 95–98)
SAO2 % BLDCOV: ABNORMAL %
SODIUM BLD-SCNC: 139 MMOL/L (ref 138–146)
SODIUM BLD-SCNC: 141 MMOL/L (ref 138–146)
SODIUM BLD-SCNC: 142 MMOL/L (ref 138–146)
SODIUM SERPL-SCNC: 140 MMOL/L (ref 136–145)
TROPONIN T SERPL HS-MCNC: <6 NG/L

## 2025-01-27 PROCEDURE — 25510000001 IOPAMIDOL PER 1 ML: Performed by: INTERNAL MEDICINE

## 2025-01-27 PROCEDURE — 25010000002 MIDAZOLAM PER 1 MG: Performed by: INTERNAL MEDICINE

## 2025-01-27 PROCEDURE — 99214 OFFICE O/P EST MOD 30 MIN: CPT | Performed by: INTERNAL MEDICINE

## 2025-01-27 PROCEDURE — 99152 MOD SED SAME PHYS/QHP 5/>YRS: CPT | Performed by: INTERNAL MEDICINE

## 2025-01-27 PROCEDURE — C1894 INTRO/SHEATH, NON-LASER: HCPCS | Performed by: INTERNAL MEDICINE

## 2025-01-27 PROCEDURE — 25010000002 LIDOCAINE 2% SOLUTION: Performed by: INTERNAL MEDICINE

## 2025-01-27 PROCEDURE — 25010000002 NITROGLYCERIN 5 MG/ML SOLUTION: Performed by: INTERNAL MEDICINE

## 2025-01-27 PROCEDURE — G0378 HOSPITAL OBSERVATION PER HR: HCPCS

## 2025-01-27 PROCEDURE — 84132 ASSAY OF SERUM POTASSIUM: CPT

## 2025-01-27 PROCEDURE — 82330 ASSAY OF CALCIUM: CPT

## 2025-01-27 PROCEDURE — 84295 ASSAY OF SERUM SODIUM: CPT

## 2025-01-27 PROCEDURE — 82947 ASSAY GLUCOSE BLOOD QUANT: CPT

## 2025-01-27 PROCEDURE — 93460 R&L HRT ART/VENTRICLE ANGIO: CPT | Performed by: INTERNAL MEDICINE

## 2025-01-27 PROCEDURE — 84484 ASSAY OF TROPONIN QUANT: CPT | Performed by: EMERGENCY MEDICINE

## 2025-01-27 PROCEDURE — C1751 CATH, INF, PER/CENT/MIDLINE: HCPCS | Performed by: INTERNAL MEDICINE

## 2025-01-27 PROCEDURE — 25010000002 FENTANYL CITRATE (PF) 100 MCG/2ML SOLUTION: Performed by: INTERNAL MEDICINE

## 2025-01-27 PROCEDURE — 85014 HEMATOCRIT: CPT

## 2025-01-27 PROCEDURE — 82803 BLOOD GASES ANY COMBINATION: CPT

## 2025-01-27 PROCEDURE — 25010000002 NICARDIPINE 2.5 MG/ML SOLUTION: Performed by: INTERNAL MEDICINE

## 2025-01-27 PROCEDURE — 25010000002 HEPARIN (PORCINE) PER 1000 UNITS: Performed by: INTERNAL MEDICINE

## 2025-01-27 PROCEDURE — C1769 GUIDE WIRE: HCPCS | Performed by: INTERNAL MEDICINE

## 2025-01-27 PROCEDURE — 80053 COMPREHEN METABOLIC PANEL: CPT | Performed by: EMERGENCY MEDICINE

## 2025-01-27 RX ORDER — NITROGLYCERIN 5 MG/ML
INJECTION, SOLUTION INTRAVENOUS
Status: DISCONTINUED | OUTPATIENT
Start: 2025-01-27 | End: 2025-01-27 | Stop reason: HOSPADM

## 2025-01-27 RX ORDER — NICARDIPINE HYDROCHLORIDE 2.5 MG/ML
INJECTION INTRAVENOUS
Status: DISCONTINUED | OUTPATIENT
Start: 2025-01-27 | End: 2025-01-27 | Stop reason: HOSPADM

## 2025-01-27 RX ORDER — ONDANSETRON 2 MG/ML
4 INJECTION INTRAMUSCULAR; INTRAVENOUS EVERY 6 HOURS PRN
Status: DISCONTINUED | OUTPATIENT
Start: 2025-01-27 | End: 2025-01-27 | Stop reason: HOSPADM

## 2025-01-27 RX ORDER — FENTANYL CITRATE 50 UG/ML
INJECTION, SOLUTION INTRAMUSCULAR; INTRAVENOUS
Status: DISCONTINUED | OUTPATIENT
Start: 2025-01-27 | End: 2025-01-27 | Stop reason: HOSPADM

## 2025-01-27 RX ORDER — ONDANSETRON 4 MG/1
4 TABLET, ORALLY DISINTEGRATING ORAL EVERY 6 HOURS PRN
Status: DISCONTINUED | OUTPATIENT
Start: 2025-01-27 | End: 2025-01-27 | Stop reason: HOSPADM

## 2025-01-27 RX ORDER — LIDOCAINE HYDROCHLORIDE 20 MG/ML
INJECTION, SOLUTION INFILTRATION; PERINEURAL
Status: DISCONTINUED | OUTPATIENT
Start: 2025-01-27 | End: 2025-01-27 | Stop reason: HOSPADM

## 2025-01-27 RX ORDER — MIDAZOLAM HYDROCHLORIDE 1 MG/ML
INJECTION, SOLUTION INTRAMUSCULAR; INTRAVENOUS
Status: DISCONTINUED | OUTPATIENT
Start: 2025-01-27 | End: 2025-01-27 | Stop reason: HOSPADM

## 2025-01-27 RX ORDER — NITROGLYCERIN 0.4 MG/1
0.4 TABLET SUBLINGUAL
Status: DISCONTINUED | OUTPATIENT
Start: 2025-01-27 | End: 2025-01-27 | Stop reason: HOSPADM

## 2025-01-27 RX ORDER — HEPARIN SODIUM 1000 [USP'U]/ML
INJECTION, SOLUTION INTRAVENOUS; SUBCUTANEOUS
Status: DISCONTINUED | OUTPATIENT
Start: 2025-01-27 | End: 2025-01-27 | Stop reason: HOSPADM

## 2025-01-27 RX ORDER — IOPAMIDOL 755 MG/ML
INJECTION, SOLUTION INTRAVASCULAR
Status: DISCONTINUED | OUTPATIENT
Start: 2025-01-27 | End: 2025-01-27 | Stop reason: HOSPADM

## 2025-01-27 RX ORDER — DIPHENHYDRAMINE HCL 25 MG
25 CAPSULE ORAL EVERY 6 HOURS PRN
Status: DISCONTINUED | OUTPATIENT
Start: 2025-01-27 | End: 2025-01-27 | Stop reason: HOSPADM

## 2025-01-27 RX ORDER — ACETAMINOPHEN 325 MG/1
650 TABLET ORAL EVERY 6 HOURS PRN
Status: DISCONTINUED | OUTPATIENT
Start: 2025-01-27 | End: 2025-01-27 | Stop reason: SDUPTHER

## 2025-01-27 RX ORDER — ACETAMINOPHEN 325 MG/1
650 TABLET ORAL EVERY 4 HOURS PRN
Status: DISCONTINUED | OUTPATIENT
Start: 2025-01-27 | End: 2025-01-27 | Stop reason: HOSPADM

## 2025-01-27 RX ADMIN — Medication 10 ML: at 11:21

## 2025-01-27 RX ADMIN — NITROGLYCERIN 1 INCH: 20 OINTMENT TOPICAL at 00:05

## 2025-01-27 RX ADMIN — ASPIRIN 325 MG ORAL TABLET 325 MG: 325 PILL ORAL at 00:05

## 2025-01-27 NOTE — CASE MANAGEMENT/SOCIAL WORK
Discharge Planning Assessment   Tadeo     Patient Name: Gina Wright  MRN: [id number]  Today's Date: 1/27/2025    Admit Date: 1/25/2025    Plan: Return to [facility] on [address] [facility]. Son to transport   Discharge Needs Assessment       Row Name 01/27/25 1242       Living Environment    Current Living Arrangements assisted living facility    Potentially Unsafe Housing Conditions none    In the past 12 months has the electric, gas, oil, or water company threatened to shut off services in your home? No    Primary Care Provided by self    Provides Primary Care For no one, unable/limited ability to care for self    Family Caregiver if Needed child(katie), adult    Family Caregiver Names son    Quality of Family Relationships supportive;helpful    Able to Return to Prior Arrangements no       Resource/Environmental Concerns    Resource/Environmental Concerns none    Transportation Concerns none       Transportation Needs    In the past 12 months, has lack of transportation kept you from medical appointments or from getting medications? no    In the past 12 months, has lack of transportation kept you from meetings, work, or from getting things needed for daily living? No       Food Insecurity    Within the past 12 months, you worried that your food would run out before you got the money to buy more. Never true    Within the past 12 months, the food you bought just didn't last and you didn't have money to get more. Never true       Transition Planning    Patient/Family Anticipates Transition to long-term care facility  lives at [facility]    Patient/Family Anticipated Services at Transition none    Transportation Anticipated family or friend will provide       Discharge Needs Assessment    Readmission Within the Last 30 Days no previous admission in last 30 days    Equipment Currently Used at Home cpap;grab bar;shower chair    Concerns to be Addressed no discharge needs identified    Do you want help finding or keeping work  or a job? I do not need or want help    Do you want help with school or training? For example, starting or completing job training or getting a high school diploma, GED or equivalent No    Anticipated Changes Related to Illness none    Equipment Needed After Discharge none    Outpatient/Agency/Support Group Needs assisted living facility                   Discharge Plan       Row Name 01/27/25 1242       Plan    Plan Return to [facility] on [facility]. Son to transport    Patient/Family in Agreement with Plan yes    Plan Comments Barriers: Heart cath today. CM met with Ms. Wright who is a/o and said she lives at [facility] on [facility] and wants to return. She does not drive and her son will provide transport home. She has a CPAP at night and no mobility equipment. PCP and Pharmacy verified and she wants to use [facility].  She manages her  own medications, laundry and self care and they provide her meals and light housekeeping.  She denied any financial concerns                  Demographic Summary       Row Name 01/27/25 1233       General Information    Admission Type observation    Arrived From emergency department    Required Notices Provided Observation Status Notice    Referral Source admission list    Reason for Consult discharge planning    Preferred Language English       Contact Information    Permission Granted to Share Info With                    Functional Status       Row Name 01/27/25 1241       Functional Status    Usual Activity Tolerance good    Current Activity Tolerance moderate       Functional Status, IADL    Medications independent    Meal Preparation completely dependent    Housekeeping assistive person    Laundry independent    Shopping assistive person    If for any reason you need help with day-to-day activities such as bathing, preparing meals, shopping, managing finances, etc., do you get the help you need? I get all the help I need    IADL Comments Lives at [facility] but is independent with  meds, laundry and self care       Mental Status    General Appearance WDL WDL       Mental Status Summary    Recent Changes in Mental Status/Cognitive Functioning no changes       Employment/    Employment Status disabled                    Cintia JOHNSONN,RN Case Manager  [facility]  Phone: Rnek- [phone] cell- [phone]

## 2025-01-27 NOTE — DISCHARGE SUMMARY
[facility]    Amilcar Cross MD    CHIEF COMPLAINT:     Chest pain     HISTORY OF PRESENT ILLNESS:    [facility]  ED 01/27/2025  65-year-old female complaining of chest pain associated with shortness of breath and decreased exercise tolerance over the last 48 hours. The patient reports no fever chills or cough. She states that she has had occasional palpitations and she does not think that is necessarily been worse. The patient reports that she has had no night sweats or hemoptysis.     Observation 01/26/2025  Pt concurs with er hpi. Cardiology consulting.     Observation 01/27/2025  Patient denies any chest pain or acute distress. Per cardiologist, heart cath today was normal and she can be discharged and f/u in 6-8 weeks.    Past Medical History:   Diagnosis Date    Anesthesia complication     pt states she needs extra d/t red hair    Arthritis     Depressive disorder     Enlarged heart     Hyperlipidemia     Hypothyroid     Migraine     MRSA infection     on hands    Obstructive sleep apnea     PONV (postoperative nausea and vomiting)     Prediabetes     Tachycardia      Past Surgical History:   Procedure Laterality Date    BACK SURGERY      CARDIAC CATHETERIZATION Right 05/27/2023    Procedure: Left Heart Cath and coronary angiogram;  Surgeon: Jack Christensen MD;  Location: [facility] INVASIVE LOCATION;  Service: Cardiovascular;  Laterality: Right;  5 Fr catheters    GALLBLADDER SURGERY      HYSTERECTOMY      MASS EXCISION Right 05/11/2022    Procedure: LIPOMA EXCISION from right shoulder;  Surgeon: Benjamin Munguia MD;  Location: [facility] OR;  Service: General;  Laterality: Right;    NASAL SEPTUM SURGERY      TONSILLECTOMY       Family History   Problem Relation Age of Onset    Stroke Mother     Mental illness Father     Heart disease Father     Sleep apnea Neg Hx      Social History     Tobacco Use    Smoking status: Never     Passive exposure: Past    Smokeless tobacco: Never   Vaping Use     Vaping status: Never Used   Substance Use Topics    Alcohol use: Not Currently    Drug use: Not Currently     Medications Prior to Admission   Medication Sig Dispense Refill Last Dose/Taking    carvedilol (COREG) 6.25 MG tablet TAKE 1 TABLET BY MOUTH 2 (TWO) TIMES A DAY. 180 tablet 1 1/25/2025 Morning    DULoxetine (CYMBALTA) 30 MG capsule Take 1 capsule by mouth Every Morning.   1/25/2025 Morning    DULoxetine (CYMBALTA) 60 MG capsule Take 1 capsule by mouth Daily. Take along with Duloxetine 30 mg to equal 90 mg daily dose.   1/25/2025 Morning    Inclisiran Sodium (LEQVIO SC) Inject  under the skin into the appropriate area as directed Every 6 (Six) Months.   12/12/2024    ipratropium (ATROVENT) 0.03 % nasal spray Administer 2 sprays into the nostril(s) as directed by provider 2 (Two) Times a Day.   1/25/2025 Morning    meloxicam (MOBIC) 15 MG tablet Take 1 tablet by mouth Daily.   1/25/2025 Morning    mirtazapine (REMERON) 15 MG tablet Take 1 tablet by mouth Every Night.   1/24/2025 Evening    pantoprazole (PROTONIX) 40 MG EC tablet Take 1 tablet by mouth Daily. 30 tablet 0 1/25/2025 Morning    Semaglutide-Weight Management 0.25 MG/0.5ML solution auto-injector Inject 0.5 mL under the skin into the appropriate area as directed Every 7 (Seven) Days. Sundays.   1/19/2025     Allergies:  Ferumoxytol, Compazine [prochlorperazine], Sulfa antibiotics, Atorvastatin, Ciprofloxacin, Crestor [rosuvastatin], Egg-derived products, Iron, Prochlorperazine edisylate, and Trazodone    Immunization History   Administered Date(s) Administered    Flu Vaccine Quad PF 6-35MO 09/09/2016    Fluzone (or Fluarix & Flulaval for VFC) >6mos 10/21/2016    Hep B, Adolescent or Pediatric 12/13/2016    Hepatitis A 12/13/2016    Hepatitis B Adult/Adolescent IM 12/13/2016    Influenza Injectable Mdck Pf Quad 08/30/2017    Influenza Seasonal Injectable 11/15/2012    Influenza, Unspecified 09/01/2022    Td, Not Adsorbed 01/01/2012    Tdap  09/24/2018           REVIEW OF SYSTEMS:    Review of Systems   Constitutional: Negative for malaise/fatigue.   Cardiovascular:  Positive for chest pain and palpitations.   Respiratory:  Positive for shortness of breath.    All other systems reviewed and are negative.  Symptoms resolved       Vital Signs  Temp:  [97.3 °F (36.3 °C)-98.8 °F (37.1 °C)] 97.3 °F (36.3 °C)  Heart Rate:  [68-89] 68  Resp:  [12-18] 12  BP: (104-120)/(53-76) 104/64          Physical Exam:  Physical Exam  Vitals and nursing note reviewed.   Constitutional:       Appearance: Normal appearance. She is obese.   HENT:      Head: Normocephalic and atraumatic.      Right Ear: External ear normal.      Left Ear: External ear normal.      Nose: Nose normal.      Mouth/Throat:      Mouth: Mucous membranes are moist.      Pharynx: Oropharynx is clear.   Eyes:      Extraocular Movements: Extraocular movements intact.   Cardiovascular:      Rate and Rhythm: Normal rate and regular rhythm.      Pulses: Normal pulses.      Heart sounds: Normal heart sounds.   Pulmonary:      Effort: Pulmonary effort is normal.      Breath sounds: Normal breath sounds.   Abdominal:      General: Abdomen is flat. Bowel sounds are normal.      Palpations: Abdomen is soft.   Musculoskeletal:         General: Normal range of motion.      Cervical back: Normal range of motion.   Skin:     General: Skin is warm.   Neurological:      General: No focal deficit present.      Mental Status: She is alert and oriented to person, place, and time.   Psychiatric:         Mood and Affect: Mood normal.         Behavior: Behavior normal.         Emotional Behavior:    Normal    Debilities:   None  Results Review:    I reviewed the patient's new clinical results.  Lab Results (most recent)       Procedure Component Value Units Date/Time    High Sensitivity Troponin T [[id number]]  (Normal) Collected: 01/27/25 0226    Specimen: Blood from Hand, Right Updated: 01/27/25 0410     HS Troponin T <6  ng/L     Narrative:      High Sensitive Troponin T Reference Range:  <14.0 ng/L- Negative Female for AMI  <22.0 ng/L- Negative Male for AMI  >=14 - Abnormal Female indicating possible myocardial injury.  >=22 - Abnormal Male indicating possible myocardial injury.   Clinicians would have to utilize clinical acumen, EKG, Troponin, and serial changes to determine if it is an Acute Myocardial Infarction or myocardial injury due to an underlying chronic condition.         Comprehensive Metabolic Panel [[id number]]  (Abnormal) Collected: 01/27/25 0226    Specimen: Blood from Hand, Right Updated: 01/27/25 0410     Glucose 91 mg/dL      BUN 15 mg/dL      Creatinine 0.84 mg/dL      Sodium 140 mmol/L      Potassium 4.1 mmol/L      Chloride 108 mmol/L      CO2 22.8 mmol/L      Calcium 9.3 mg/dL      Total Protein 6.3 g/dL      Albumin 4.0 g/dL      ALT (SGPT) 16 U/L      AST (SGOT) 21 U/L      Alkaline Phosphatase 97 U/L      Total Bilirubin 0.2 mg/dL      Globulin 2.3 gm/dL      A/G Ratio 1.7 g/dL      BUN/Creatinine Ratio 17.9     Anion Gap 9.2 mmol/L      eGFR 78.2 mL/min/1.73     Narrative:      GFR Categories in Chronic Kidney Disease (CKD)      GFR Category          GFR (mL/min/1.73)    Interpretation  G1                     90 or greater         Normal or high (1)  G2                      60-89                Mild decrease (1)  G3a                   45-59                Mild to moderate decrease  G3b                   30-44                Moderate to severe decrease  G4                    15-29                Severe decrease  G5                    14 or less           Kidney failure          (1)In the absence of evidence of kidney disease, neither GFR category G1 or G2 fulfill the criteria for CKD.    eGFR calculation 2021 CKD-EPI creatinine equation, which does not include race as a factor    BNP [[id number]]  (Normal) Collected: 01/26/25 0340    Specimen: Blood Updated: 01/26/25 0802     proBNP 45.2 pg/mL     Narrative:       This assay is used as an aid in the diagnosis of individuals suspected of having heart failure. It can be used as an aid in the diagnosis of acute decompensated heart failure (ADHF) in patients presenting with signs and symptoms of ADHF to the emergency department (ED). In addition, NT-proBNP of <300 pg/mL indicates ADHF is not likely.    Age Range Result Interpretation  NT-proBNP Concentration (pg/mL:      <50             Positive            >450                   Gray                 300-450                    Negative             <300    50-75           Positive            >900                  Gray                300-900                  Negative            <300      >75             Positive            >1800                  Gray                300-1800                  Negative            <300    Lipid Panel [[id number]] Collected: 01/26/25 0340    Specimen: Blood Updated: 01/26/25 0548     Total Cholesterol 137 mg/dL      Triglycerides 144 mg/dL      HDL Cholesterol 55 mg/dL      LDL Cholesterol  57 mg/dL      VLDL Cholesterol 25 mg/dL      LDL/HDL Ratio 0.97    Narrative:      Cholesterol Reference Ranges  (U.S. Department of Health and Human Services ATP III Classifications)    Desirable          <200 mg/dL  Borderline High    200-239 mg/dL  High Risk          >240 mg/dL      Triglyceride Reference Ranges  (U.S. Department of Health and Human Services ATP III Classifications)    Normal           <150 mg/dL  Borderline High  150-199 mg/dL  High             200-499 mg/dL  Very High        >500 mg/dL    HDL Reference Ranges  (U.S. Department of Health and Human Services ATP III Classifications)    Low     <40 mg/dl (major risk factor for CHD)  High    >60 mg/dl ('negative' risk factor for CHD)        LDL Reference Ranges  (U.S. Department of Health and Human Services ATP III Classifications)    Optimal          <100 mg/dL  Near Optimal     100-129 mg/dL  Borderline High  130-159 mg/dL  High              160-189 mg/dL  Very High        >189 mg/dL    LDL is calculated using the NIH LDL-C calculation.      High Sensitivity Troponin T [[id number]]  (Normal) Collected: 01/26/25 0340    Specimen: Blood Updated: 01/26/25 0548     HS Troponin T 8 ng/L     Narrative:      High Sensitive Troponin T Reference Range:  <14.0 ng/L- Negative Female for AMI  <22.0 ng/L- Negative Male for AMI  >=14 - Abnormal Female indicating possible myocardial injury.  >=22 - Abnormal Male indicating possible myocardial injury.   Clinicians would have to utilize clinical acumen, EKG, Troponin, and serial changes to determine if it is an Acute Myocardial Infarction or myocardial injury due to an underlying chronic condition.         High Sensitivity Troponin T 1Hr [[id number]]  (Abnormal) Collected: 01/25/25 1951    Specimen: Blood from Arm, Left Updated: 01/25/25 2018     HS Troponin T 16 ng/L      Troponin T Numeric Delta 10 ng/L     Narrative:      High Sensitive Troponin T Reference Range:  <14.0 ng/L- Negative Female for AMI  <22.0 ng/L- Negative Male for AMI  >=14 - Abnormal Female indicating possible myocardial injury.  >=22 - Abnormal Male indicating possible myocardial injury.   Clinicians would have to utilize clinical acumen, EKG, Troponin, and serial changes to determine if it is an Acute Myocardial Infarction or myocardial injury due to an underlying chronic condition.         Extra Tubes [[id number]] Collected: 01/25/25 1856    Specimen: Blood from Arm, Left Updated: 01/25/25 1931    Narrative:      The following orders were created for panel order Extra Tubes.  Procedure                               Abnormality         Status                     ---------                               -----------         ------                     Gold Top - SST[[id number]]                                   Final result                 Please view results for these tests on the individual orders.    Gold Top - SST [[id number]] Collected: 01/25/25 1856     Specimen: Blood from Arm, Left Updated: 01/25/25 1931     Extra Tube Hold for add-ons.     Comment: Auto resulted.       Comprehensive Metabolic Panel [[id number]] Collected: 01/25/25 1856    Specimen: Blood from Arm, Left Updated: 01/25/25 1928     Glucose 96 mg/dL      BUN 17 mg/dL      Creatinine 0.89 mg/dL      Sodium 140 mmol/L      Potassium 4.8 mmol/L      Comment: Slight hemolysis detected by analyzer. Result may be falsely elevated.        Chloride 105 mmol/L      CO2 24.3 mmol/L      Calcium 9.4 mg/dL      Total Protein 6.8 g/dL      Albumin 4.4 g/dL      ALT (SGPT) 19 U/L      AST (SGOT) 28 U/L      Comment: Slight hemolysis detected by analyzer. Result may be falsely elevated.        Alkaline Phosphatase 109 U/L      Total Bilirubin <0.2 mg/dL      Globulin 2.4 gm/dL      A/G Ratio 1.8 g/dL      BUN/Creatinine Ratio 19.1     Anion Gap 10.7 mmol/L      eGFR 73.0 mL/min/1.73     Narrative:      GFR Categories in Chronic Kidney Disease (CKD)      GFR Category          GFR (mL/min/1.73)    Interpretation  G1                     90 or greater         Normal or high (1)  G2                      60-89                Mild decrease (1)  G3a                   45-59                Mild to moderate decrease  G3b                   30-44                Moderate to severe decrease  G4                    15-29                Severe decrease  G5                    14 or less           Kidney failure          (1)In the absence of evidence of kidney disease, neither GFR category G1 or G2 fulfill the criteria for CKD.    eGFR calculation 2021 CKD-EPI creatinine equation, which does not include race as a factor    D-dimer, Quantitative [[id number]]  (Normal) Collected: 01/25/25 1856    Specimen: Blood from Arm, Left Updated: 01/25/25 1914     D-Dimer, Quantitative 0.29 MCGFEU/mL     Narrative:      According to the assay 's published package insert, a normal (<0.50 MCGFEU/mL) D-dimer result in conjunction with a  "non-high clinical probability assessment, excludes deep vein thrombosis (DVT) and pulmonary embolism (PE) with high sensitivity.    D-dimer values increase with age and this can make VTE exclusion of an older population difficult. To address this, the American College [employer], based on best available evidence and recent guidelines, recommends that clinicians use age-adjusted D-dimer thresholds in patients greater than 50 years of age with: a) a low probability of PE who do not meet all Pulmonary Embolism Rule Out Criteria, or b) in those with intermediate probability of PE.   The formula for an age-adjusted D-dimer cut-off is \"age/100\".  For example, a 60 year old patient would have an age-adjusted cut-off of 0.60 MCGFEU/mL and an 80 year old 0.80 MCGFEU/mL.    CBC & Differential [[id number]]  (Abnormal) Collected: 01/25/25 1856    Specimen: Blood from Arm, Left Updated: 01/25/25 1902    Narrative:      The following orders were created for panel order CBC & Differential.  Procedure                               Abnormality         Status                     ---------                               -----------         ------                     CBC Auto Differential[[id number]]        Abnormal            Final result                 Please view results for these tests on the individual orders.    CBC Auto Differential [[id number]]  (Abnormal) Collected: 01/25/25 1856    Specimen: Blood from Arm, Left Updated: 01/25/25 1902     WBC 9.10 10*3/mm3      RBC 3.62 10*6/mm3      Hemoglobin 10.3 g/dL      Hematocrit 32.0 %      MCV 88.4 fL      MCH 28.5 pg      MCHC 32.2 g/dL      RDW 14.6 %      RDW-SD 47.7 fl      MPV 10.0 fL      Platelets 322 10*3/mm3      Neutrophil % 64.9 %      Lymphocyte % 28.2 %      Monocyte % 5.2 %      Eosinophil % 1.3 %      Basophil % 0.2 %      Immature Grans % 0.2 %      Neutrophils, Absolute 5.90 10*3/mm3      Lymphocytes, Absolute 2.57 10*3/mm3      Monocytes, Absolute 0.47 10*3/mm3      " Eosinophils, Absolute 0.12 10*3/mm3      Basophils, Absolute 0.02 10*3/mm3      Immature Grans, Absolute 0.02 10*3/mm3      nRBC 0.0 /100 WBC             Imaging Results (Most Recent)       Procedure Component Value Units Date/Time    CT Angiogram Chest Pulmonary Embolism [[id number]] Collected: 01/25/25 2124     Updated: 01/25/25 2136    Narrative:      CT ANGIOGRAM CHEST PULMONARY EMBOLISM    Date of Exam: 1/25/2025 8:31 PM EST    Indication: Severe chest pain.    Comparison: None available.    Technique: Axial CT images were obtained of the chest after the uneventful intravenous administration of iodinated contrast utilizing pulmonary embolism protocol.  In addition, a 3-D volume rendered image was created for interpretation.  Sagittal and   coronal reconstructions were performed.  Automated exposure control and iterative reconstruction methods were used.      Findings:    Pulmonary arteries: Adequate opacification of the pulmonary arteries. No evidence of acute pulmonary embolism.    Lungs and Pleura: There are no focal infiltrates. There is mild bilateral basilar atelectasis. There are no suspicious pulmonary nodules. There is no pleural effusion.    Mediastinum/Krysten: No mediastinal or hilar lymphadenopathy.    Lymph nodes: No axillary or supraclavicular adenopathy.    Cardiovascular: The cardiac chambers are within normal limits. The pericardium is normal. The aorta and its arch branch vessels are unremarkable.       Upper Abdomen: There is a moderate size hiatal hernia. Otherwise, the upper abdominal contents are unremarkable.          Bones and Soft Tissue: No suspicious osseous lesion.        Impression:      Impression:  1.No evidence of pulmonary embolism.  2.Moderate size hiatal hernia.  3.Mild bilateral basilar atelectasis.            Electronically Signed: Manny Sanchez MD    1/25/2025 9:34 PM EST    Workstation ID: [id number]    XR Chest 1 View [[id number]] Collected: 01/25/25 1919     Updated: 01/25/25  1922    Narrative:      XR CHEST 1 VW    Date of Exam: 1/25/2025 7:00 PM EST    Indication: chest pain    Comparison: None available.    Findings:  The lungs are clear. Cardiac, hilar, and mediastinal silhouettes are within normal limits. There is an air-filled retrocardiac density compatible with a moderate hiatal hernia. No pneumothorax or pleural effusions. The trachea is midline. Pulmonary   vascularity is normal. Visualized bony structures are intact.        Impression:      Impression:  Moderate hiatal hernia. No active cardiopulmonary disease.        Electronically Signed: Sanjiv Cintron DO    1/25/2025 7:20 PM EST    Workstation ID: [id number]          reviewed    ECG/EMG Results (most recent)       Procedure Component Value Units Date/Time    ECG 12 Lead Chest Pain [[id number]] Collected: 01/25/25 1841     Updated: 01/26/25 1307     QT Interval 400 ms      QTC Interval 465 ms     Narrative:      HEART RATE=81  bpm  RR Qfwurlno=036  ms  CA Mfyyhrow=566  ms  P Horizontal Axis=8  deg  P Front Axis=22  deg  QRSD Ofnfxxxw=594  ms  QT Wkfthgqw=075  ms  INwO=124  ms  QRS Axis=-8  deg  T Wave Axis=39  deg  - BORDERLINE ECG -  Sinus rhythm  Low voltage, precordial leads  Consider  anterior infarct  When compared with ECG of 17-Jun-2024 12:48:24,  Significant repolarization change  Electronically Signed By: Jose Gallardo ([facility]) 2025-01-26 13:07:02  Date and Time of Study:2025-01-25 18:41:27    ECG 12 Lead Chest Pain [[id number]] Collected: 01/25/25 2107     Updated: 01/26/25 1308     QT Interval 404 ms      QTC Interval 467 ms     Narrative:      HEART RATE=80  bpm  RR Halcdikd=681  ms  CA Nzklrhvu=952  ms  P Horizontal Axis=3  deg  P Front Axis=41  deg  QRSD Kzpnudzq=859  ms  QT Lhwhxeoo=473  ms  LUuA=908  ms  QRS Axis=-16  deg  T Wave Axis=66  deg  - ABNORMAL ECG -  Sinus rhythm  Low voltage, precordial leads  Anteroseptal  infarct, old  When compared with ECG of 25-Jan-2025 18:41:27,  No significant  change  Electronically Signed By: Jose Gallardo ([place]) 2025-01-26 13:07:47  Date and Time of Study:2025-01-25 21:07:43          reviewed        Results for orders placed in visit on 04/10/24    Adult Transthoracic Echo Complete W/ Cont if Necessary Per Protocol    Interpretation Summary    Left ventricular ejection fraction appears to be 51 - 55%.    Left ventricular diastolic function is consistent with (grade I) impaired relaxation.  GLS -13.8%.    Estimated right ventricular systolic pressure from tricuspid regurgitation is normal (<35 mmHg).    No significant valvular abnormalities noted.      Microbiology Results (last 10 days)       ** No results found for the last 240 hours. **            Assessment & Plan     Chest pain          Chest pain        Lab Results   Component Value Date     TROPONINT 8 01/26/2025     TROPONINT 16 (H) 01/25/2025     TROPONINT 6 01/25/2025    -cbc, cmp, ddimer, lipid panel, bnp unremarkable  -Chest X-ray:reviewed and showing sherrie cute process, hiatal hernia  -EKG:rate 80 sinus  - cta chest no pe. Mild basilar atelectasis  -Stress Test performed and showed mild proximal inferior ischemia   - cardiology consulted  -Heart cath on 1/27/25 was normal   -Telemetry  -F/u with cardiologist in 6-8 weeks     Hypertension  -well Controlled   BP Readings from Last 1 Encounters:   01/27/25 125/72         BP Readings from Last 1 Encounters:   01/26/25 112/68   -Coreg was held during observation due to hypotension  -Hold it for now, monitor/log bp at home and f/u with cardiologist outpatient for further recommendations.  - Monitor while admitted     I discussed the patients findings and my recommendations with patient and nursing staff.     Discharge Diagnosis:      Chest pain      Hospital Course  Patient is a 65 y.o. female presented with chest pain as noted on HPI above.  Poni, CBC, CMP, D-dimer, lipid panel, chest x-ray and EKG unremarkable.  CTA showed no PE.  Patient was admitted to the  observation unit for a stress test which showed mid to proximal inferior ischemia.  Cardiologist was consulted and patient underwent a heart cath on 1/27 which was normal.  She was instructed to hold Coreg due to hypotension and follow-up with cardiologist in 6 to 8 weeks. At this time, patient felt to be in good condition for discharge with close follow up with PCP. Instructed to take all medications as prescribed and to return to ED if any concerning signs/symptoms. All test/lab results were discussed with patient. All questions were answered and patient verbalizes understanding.   .      Past Medical History:     Past Medical History:   Diagnosis Date    Anesthesia complication     pt states she needs extra d/t red hair    Arthritis     Depressive disorder     Enlarged heart     Hyperlipidemia     Hypothyroid     Migraine     MRSA infection     on hands    Obstructive sleep apnea     PONV (postoperative nausea and vomiting)     Prediabetes     Tachycardia        Past Surgical History:     Past Surgical History:   Procedure Laterality Date    BACK SURGERY      CARDIAC CATHETERIZATION Right 05/27/2023    Procedure: Left Heart Cath and coronary angiogram;  Surgeon: Jack Christensen MD;  Location: [facility] CATH INVASIVE LOCATION;  Service: Cardiovascular;  Laterality: Right;  5 Fr catheters    GALLBLADDER SURGERY      HYSTERECTOMY      MASS EXCISION Right 05/11/2022    Procedure: LIPOMA EXCISION from right shoulder;  Surgeon: Benjamin Munguia MD;  Location: [facility] MAIN OR;  Service: General;  Laterality: Right;    NASAL SEPTUM SURGERY      TONSILLECTOMY         Social History:   Social History     Socioeconomic History    Marital status:     Number of children: 2    Years of education: 14   Tobacco Use    Smoking status: Never     Passive exposure: Past    Smokeless tobacco: Never   Vaping Use    Vaping status: Never Used   Substance and Sexual Activity    Alcohol use: Not Currently    Drug use: Not Currently     Sexual activity: Defer       Procedures Performed         Consults:   Consults       Date and Time Order Name Status Description    1/26/2025  8:05 AM Inpatient Cardiology Consult Completed             Condition on Discharge:     Stable    Discharge Disposition      Discharge Medications     Discharge Medications        ASK your doctor about these medications        Instructions Start Date   carvedilol 6.25 MG tablet  Commonly known as: COREG   6.25 mg, Oral, 2 Times Daily      DULoxetine 30 MG capsule  Commonly known as: CYMBALTA   30 mg, Every Morning      DULoxetine 60 MG capsule  Commonly known as: CYMBALTA   60 mg, Daily      ipratropium 0.03 % nasal spray  Commonly known as: ATROVENT   2 sprays, 2 Times Daily      LEQVIO SC   Every 6 Months      meloxicam 15 MG tablet  Commonly known as: MOBIC   15 mg, Daily      mirtazapine 15 MG tablet  Commonly known as: REMERON   15 mg, Nightly      pantoprazole 40 MG EC tablet  Commonly known as: PROTONIX   40 mg, Oral, Daily      Semaglutide-Weight Management 0.25 MG/0.5ML solution auto-injector   0.25 mg, Every 7 Days               Discharge Diet:     Activity at Discharge:     Follow-up Appointments  Future Appointments   Date Time Provider Department Center   7/11/2025 11:45 AM Jack Christensen MD [facility]         Test Results Pending at Discharge  Pending Results       None             Risk for Readmission (LACE) Score: 2 (1/27/2025  6:00 AM)      Greater than 30 minutes spent in discharge activities for this patient    Signature:Electronically signed by CHE [place], 01/27/25, 2:41 PM EST.

## 2025-01-27 NOTE — PROGRESS NOTES
Referring Provider: Jose Gallardo MD    Reason for follow-up: Chest pain, shortness of breath     Patient Care Team:  Amilcar Cross MD as PCP - General (Family Medicine)  Benjamin Munguia MD as Consulting Physician (General Surgery)  Jack Christensen MD as Cardiologist (Cardiology)      SUBJECTIVE  Resting comfortably.  Currently chest pain and shortness of breath free however concerned about her symptoms and abnormal nuclear stress test.     ROS  Review of all systems negative except as indicated.    Since I have last seen, the patient has been without any chest discomfort, shortness of breath, palpitations, dizziness or syncope.  Denies having any headache, abdominal pain, nausea, vomiting, diarrhea, constipation, loss of weight or loss of appetite.  Denies having any excessive bruising, hematuria or blood in the stool.        Personal History:    Past Medical History:   Diagnosis Date    Anesthesia complication     pt states she needs extra d/t red hair    Arthritis     Depressive disorder     Enlarged heart     Hyperlipidemia     Hypothyroid     Migraine     MRSA infection     on hands    Obstructive sleep apnea     PONV (postoperative nausea and vomiting)     Prediabetes     Tachycardia        Past Surgical History:   Procedure Laterality Date    BACK SURGERY      CARDIAC CATHETERIZATION Right 05/27/2023    Procedure: Left Heart Cath and coronary angiogram;  Surgeon: Jack Christensen MD;  Location: [facility] CATH INVASIVE LOCATION;  Service: Cardiovascular;  Laterality: Right;  5 Fr catheters    GALLBLADDER SURGERY      HYSTERECTOMY      MASS EXCISION Right 05/11/2022    Procedure: LIPOMA EXCISION from right shoulder;  Surgeon: Benjamin Munguia MD;  Location: [facility] MAIN OR;  Service: General;  Laterality: Right;    NASAL SEPTUM SURGERY      TONSILLECTOMY         Family History   Problem Relation Age of Onset    Stroke Mother     Mental illness Father     Heart disease Father     Sleep apnea Neg Hx        Social  History     Tobacco Use    Smoking status: Never     Passive exposure: Past    Smokeless tobacco: Never   Vaping Use    Vaping status: Never Used   Substance Use Topics    Alcohol use: Not Currently    Drug use: Not Currently        Home meds:  Prior to Admission medications    Medication Sig Start Date End Date Taking? Authorizing Provider   carvedilol (COREG) 6.25 MG tablet TAKE 1 TABLET BY MOUTH 2 (TWO) TIMES A DAY. 9/5/24  Yes Jack Christensen MD   DULoxetine (CYMBALTA) 30 MG capsule Take 1 capsule by mouth Every Morning. 3/7/22  Yes Pernell Rivera MD   DULoxetine (CYMBALTA) 60 MG capsule Take 1 capsule by mouth Daily. Take along with Duloxetine 30 mg to equal 90 mg daily dose.   Yes Pernell Rivera MD   Inclisiran Sodium (LEQVIO SC) Inject  under the skin into the appropriate area as directed Every 6 (Six) Months.   Yes Pernell Rivera MD   ipratropium (ATROVENT) 0.03 % nasal spray Administer 2 sprays into the nostril(s) as directed by provider 2 (Two) Times a Day. 1/24/25  Yes Pernell Rivera MD   meloxicam (MOBIC) 15 MG tablet Take 1 tablet by mouth Daily.   Yes Pernell Rivera MD   mirtazapine (REMERON) 15 MG tablet Take 1 tablet by mouth Every Night. 4/4/24  Yes Pernell Rivera MD   pantoprazole (PROTONIX) 40 MG EC tablet Take 1 tablet by mouth Daily. 5/27/23  Yes Edilia Miranda APRN   Semaglutide-Weight Management 0.25 MG/0.5ML solution auto-injector Inject 0.5 mL under the skin into the appropriate area as directed Every 7 (Seven) Days. Sundays.   Yes Pernell Rivera MD       Allergies:  Ferumoxytol, Compazine [prochlorperazine], Sulfa antibiotics, Atorvastatin, Ciprofloxacin, Crestor [rosuvastatin], Egg-derived products, Iron, Prochlorperazine edisylate, and Trazodone    Scheduled Meds:[Held by provider] carvedilol, 6.25 mg, Oral, BID  DULoxetine, 90 mg, Oral, QAM  midodrine, 10 mg, Oral, Q8H  mirtazapine, 15 mg, Oral, Nightly  pantoprazole, 40 mg, Oral,  "Daily  sodium chloride, 10 mL, Intravenous, Q12H      Continuous Infusions:   PRN Meds:.  aluminum-magnesium hydroxide-simethicone    Lidocaine Viscous HCl    melatonin    ondansetron    sodium chloride    sodium chloride      OBJECTIVE    Vital Signs  Vitals:    01/26/25 2231 01/26/25 2314 01/27/25 0005 01/27/25 0413   BP: 109/53 104/60 110/54 104/64   BP Location:  Right arm  Right arm   Patient Position:  Lying  Lying   Pulse: 81 81 87 68   Resp:  18  12   Temp:  98.4 °F (36.9 °C)  97.3 °F (36.3 °C)   TempSrc:  Oral  Oral   SpO2:  95%  98%   Weight:       Height:           Flowsheet Rows      Flowsheet Row First Filed Value   Admission Height 152.4 cm (60\") Documented at 01/25/2025 1831   Admission Weight 80 kg (176 lb 5.9 oz) Documented at 01/25/2025 1831              Intake/Output Summary (Last 24 hours) at 1/27/2025 0643  Last data filed at 1/26/2025 1700  Gross per 24 hour   Intake 240 ml   Output 1 ml   Net 239 ml          Telemetry: Sinus rhythm    Physical Exam:  The patient is alert, oriented and in no distress.  Obese  Vital signs as noted above.  Head and neck revealed no carotid bruits or jugular venous distention.  No thyromegaly or lymphadenopathy is present  Lungs clear.  No wheezing.  Breath sounds are normal bilaterally.  Heart normal first and second heart sounds.  No murmur. No precordial rub is present.  No gallop is present.  Abdomen soft and nontender.  No organomegaly is present.  Extremities with good peripheral pulses without any pedal edema.  Skin warm and dry.  Musculoskeletal system is grossly normal.  CNS grossly normal.       Results Review:  I have personally reviewed the results from the time of this admission to 1/27/2025 06:43 EST and agree with these findings:  []  Laboratory  []  Microbiology  []  Radiology  []  EKG/Telemetry   []  Cardiology/Vascular   []  Pathology  []  Old records  []  Other:    Most notable findings include:    Lab Results (last 24 hours)       Procedure " Component Value Units Date/Time    High Sensitivity Troponin T [[id number]]  (Normal) Collected: 01/27/25 0226    Specimen: Blood from Hand, Right Updated: 01/27/25 0410     HS Troponin T <6 ng/L     Narrative:      High Sensitive Troponin T Reference Range:  <14.0 ng/L- Negative Female for AMI  <22.0 ng/L- Negative Male for AMI  >=14 - Abnormal Female indicating possible myocardial injury.  >=22 - Abnormal Male indicating possible myocardial injury.   Clinicians would have to utilize clinical acumen, EKG, Troponin, and serial changes to determine if it is an Acute Myocardial Infarction or myocardial injury due to an underlying chronic condition.         Comprehensive Metabolic Panel [[id number]]  (Abnormal) Collected: 01/27/25 0226    Specimen: Blood from Hand, Right Updated: 01/27/25 0410     Glucose 91 mg/dL      BUN 15 mg/dL      Creatinine 0.84 mg/dL      Sodium 140 mmol/L      Potassium 4.1 mmol/L      Chloride 108 mmol/L      CO2 22.8 mmol/L      Calcium 9.3 mg/dL      Total Protein 6.3 g/dL      Albumin 4.0 g/dL      ALT (SGPT) 16 U/L      AST (SGOT) 21 U/L      Alkaline Phosphatase 97 U/L      Total Bilirubin 0.2 mg/dL      Globulin 2.3 gm/dL      A/G Ratio 1.7 g/dL      BUN/Creatinine Ratio 17.9     Anion Gap 9.2 mmol/L      eGFR 78.2 mL/min/1.73     Narrative:      GFR Categories in Chronic Kidney Disease (CKD)      GFR Category          GFR (mL/min/1.73)    Interpretation  G1                     90 or greater         Normal or high (1)  G2                      60-89                Mild decrease (1)  G3a                   45-59                Mild to moderate decrease  G3b                   30-44                Moderate to severe decrease  G4                    15-29                Severe decrease  G5                    14 or less           Kidney failure          (1)In the absence of evidence of kidney disease, neither GFR category G1 or G2 fulfill the criteria for CKD.    eGFR calculation 2021 CKD-EPI  creatinine equation, which does not include race as a factor    BNP [[id number]]  (Normal) Collected: 01/26/25 0340    Specimen: Blood Updated: 01/26/25 0802     proBNP 45.2 pg/mL     Narrative:      This assay is used as an aid in the diagnosis of individuals suspected of having heart failure. It can be used as an aid in the diagnosis of acute decompensated heart failure (ADHF) in patients presenting with signs and symptoms of ADHF to the emergency department (ED). In addition, NT-proBNP of <300 pg/mL indicates ADHF is not likely.    Age Range Result Interpretation  NT-proBNP Concentration (pg/mL:      <50             Positive            >450                   Gray                 300-450                    Negative             <300    50-75           Positive            >900                  Gray                300-900                  Negative            <300      >75             Positive            >1800                  Gray                300-1800                  Negative            <300            Imaging Results (Last 24 Hours)       ** No results found for the last 24 hours. **            LAB RESULTS (LAST 7 DAYS)    CBC  Results from last 7 days   Lab Units 01/25/25  1856   WBC 10*3/mm3 9.10   RBC 10*6/mm3 3.62*   HEMOGLOBIN g/dL 10.3*   HEMATOCRIT % 32.0*   MCV fL 88.4   PLATELETS 10*3/mm3 322       BMP  Results from last 7 days   Lab Units 01/27/25  0226 01/25/25  1856   SODIUM mmol/L 140 140   POTASSIUM mmol/L 4.1 4.8   CHLORIDE mmol/L 108* 105   CO2 mmol/L 22.8 24.3   BUN mg/dL 15 17   CREATININE mg/dL 0.84 0.89   GLUCOSE mg/dL 91 96       CMP   Results from last 7 days   Lab Units 01/27/25  0226 01/25/25  1856   SODIUM mmol/L 140 140   POTASSIUM mmol/L 4.1 4.8   CHLORIDE mmol/L 108* 105   CO2 mmol/L 22.8 24.3   BUN mg/dL 15 17   CREATININE mg/dL 0.84 0.89   GLUCOSE mg/dL 91 96   ALBUMIN g/dL 4.0 4.4   BILIRUBIN mg/dL 0.2 <0.2   ALK PHOS U/L 97 109   AST (SGOT) U/L 21 28   ALT (SGPT) U/L 16 19        BNP        TROPONIN  Results from last 7 days   Lab Units 01/27/25  0226   HSTROP T ng/L <6       CoAg        Creatinine Clearance  Estimated Creatinine Clearance: 62.9 mL/min (by C-G formula based on SCr of 0.84 mg/dL).    ABG        Radiology  CT Angiogram Chest Pulmonary Embolism    Result Date: 1/25/2025  Impression: 1.No evidence of pulmonary embolism. 2.Moderate size hiatal hernia. 3.Mild bilateral basilar atelectasis. Electronically Signed: Manny Sanchez MD  1/25/2025 9:34 PM EST  Workstation ID: [id number]    XR Chest 1 View    Result Date: 1/25/2025  Impression: Moderate hiatal hernia. No active cardiopulmonary disease. Electronically Signed: Sanjiv Cintron DO  1/25/2025 7:20 PM EST  Workstation ID: [id number]       EKG  I personally viewed and interpreted the patient's EKG/Telemetry data:  ECG 12 Lead Chest Pain   Final Result   HEART RATE=80  bpm   RR Kegyqwyl=832  ms   NY Mulffazj=290  ms   P Horizontal Axis=3  deg   P Front Axis=41  deg   QRSD Awtqmxwg=489  ms   QT Qxozzweb=053  ms   VNkU=462  ms   QRS Axis=-16  deg   T Wave Axis=66  deg   - ABNORMAL ECG -   Sinus rhythm   Low voltage, precordial leads   Anteroseptal  infarct, old   When compared with ECG of 25-Jan-2025 18:41:27,   No significant change   Electronically Signed By: Jose Gallardo ([place]) 2025-01-26 13:07:47   Date and Time of Study:2025-01-25 21:07:43      ECG 12 Lead Chest Pain   Final Result   HEART RATE=81  bpm   RR Ofiujfci=239  ms   NY Bbugjljx=843  ms   P Horizontal Axis=8  deg   P Front Axis=22  deg   QRSD Exdnmhrp=968  ms   QT Caodvqjz=190  ms   NAcE=356  ms   QRS Axis=-8  deg   T Wave Axis=39  deg   - BORDERLINE ECG -   Sinus rhythm   Low voltage, precordial leads   Consider  anterior infarct   When compared with ECG of 17-Jun-2024 12:48:24,   Significant repolarization change   Electronically Signed By: Jose Gallardo ([place]) 2025-01-26 13:07:02   Date and Time of Study:2025-01-25 18:41:27            Echocardiogram:    Results for  orders placed in visit on 04/10/24    Adult Transthoracic Echo Complete W/ Cont if Necessary Per Protocol    Interpretation Summary    Left ventricular ejection fraction appears to be 51 - 55%.    Left ventricular diastolic function is consistent with (grade I) impaired relaxation.  GLS -13.8%.    Estimated right ventricular systolic pressure from tricuspid regurgitation is normal (<35 mmHg).    No significant valvular abnormalities noted.        Stress Test:  Results for orders placed during the hospital encounter of 01/25/25    Stress Test With Myocardial Perfusion One Day    Interpretation Summary  Indications  Chest pain    This study was performed under the direct supervision of Diana nurse practitioner.    Resting ECG  Normal sinus rhythm nonspecific ST-T wave changes    The patient was injected with Lexiscan intravenously while constantly monitoring electrocardiogram and vital signs.  Patient did not have any chest discomfort ST abnormalities or ectopy with injection of Lexiscan.    Cardiolite was used as an imaging agent.    Cardiolite images showed mild proximal inferior ischemia.  Gated SPECT images revealed normal left ventricle size and contractility with ejection fraction of 73%.    Impression  ========  Lexiscan Cardiolite test showed mild proximal inferior ischemia.  Gated SPECT images revealed normal left ventricular size and contractility with ejection fraction of 73%.         Cardiac Catheterization:  Results for orders placed during the hospital encounter of 05/26/23    Cardiac Catheterization/Vascular Study    Conclusion  OPERATORS:  1. Jack Christensen M.D., Attending Cardiologist      PROCEDURE PERFORMED.  Ultrasound guided vascular access  Left heart catheterization  Coronary Angiogram [id number]  Moderate Sedation    INDICATIONS FOR PROCEDURE.  63 years old woman presented with chest pain suspicious for unstable angina.  She was noted to have an abnormal nuclear stress test.  After discussing the  risk and benefit of the procedure she was brought to the Cath Lab for definitive coronary angiography.    PROCEDURE IN DETAIL.  Informed consent was obtained from the patient after explaining the risks, benefits, and alternative options of the procedure. After obtaining informed consent, the patient was brought to the cath lab and was prepped in a sterile fashion. Lidocaine 1% was used for local anesthesia into the right radial access site. The right radial artery was accessed under direct ultrasound visualization  with an angiocath needle via modified Seldinger technique. A 6F slender sheath was inserted successfully. Afterwards, 6F JR4  was advanced over a wire into the ascending aorta and used to cross the AV and obtain LV pressures.  AV gradient obtained via pullback technique. JR4 and JL3.5 diagnostic catheters were used to engage the ostia of the RCA and LM respectively. Images of the right and left coronary systems were obtained. All the catheters were exchanged over a wire and subsequently removed. The patient tolerated the procedure well without any complications. The pictures were reviewed at the end of the procedure. TR band applied to right wrist for hemostasis and inflated with 15 cc of air. No complications were encountered.    HEMODYNAMICS.  LV: 102/6, 12 mmHg  AO: 103/72, 84 mmHg  No significant gradient across the aortic valve during pullback of JR4 catheter.  LV gram was not performed due to recent echocardiogram.    FINDINGS.    Coronary Angiogram.    Left dominant coronary system    1. Left main. Left main is a large-caliber vessel which gives rise to the Left Anterior Descending and the Left circumflex.  Left main is angiographically free from any significant disease    2. Left Anterior Descending Artery. LAD is a large vessel which gives rise to several septal perforators and several diagonal branches. It is angiographically free from any significant disease    3. Left Circumflex. The LCx is a  dominant large caliber caliber which gives rise to marginals.  It also gives rise to the PDA.  Left circumflex artery is angiographically free from any significant disease    4. Right Coronary Artery. The RCA is a nondominant small vessel.    IMPRESSIONS.  1. Non-obstructive CAD  2.  Normal LVEDP    RECOMMENDATIONS.  1. Continue aggressive risk factor modification for primary prevention.  2. Exercise and lifestyle modifications recommended.         Other:         ASSESSMENT & PLAN:    Principal Problem:    Chest pain    Chest pain  64 years old woman presents with chest pain concerning for unstable angina.  ECG showed nonspecific ST-T wave changes in the lateral leads.  Minimally elevated high-sensitivity troponin with abnormal nuclear stress test  Previous cardiac catheterization was in 2023  Repeat cardiac catheterization: Procedure described and risk and benefits discussed    Shortness of breath  Unclear etiology but may be secondary to obesity  Echocardiogram shows preserved LV function, grade 1 diastolic dysfunction  LVEDP was normal during cardiac catheterization.  No significant valvular abnormalities noted  Likely secondary to obesity, sleep apnea  Excessive sweating and palpitations are likely due to deconditioning.  I have encouraged her to walk every day and continue to lose weight.     Hypertension  Coreg has been held due to hypotension     Hyperlipidemia  , HDL 71, triglyceride 124, total cholesterol 234  Goal LDL less than 100.  Repeat lipid panel shows LDL 57, HDL 55, triglyceride 144 and total cholesterol 137  Continue Leqvio     Prediabetes  A1c is 6.1  Diet, exercise, weight loss discussed with the patient  Also discussed the possibility of starting metformin.     Obesity  BMI is 33.  She weighs 170 pounds.  She has lost 25 pounds  She could not tolerate Wegovy  Screening and treatment for sleep apnea  Diet, exercise, weight loss and lifestyle modifications discussed with the patient in  details.     Anxiety/depression  Currently on Cymbalta.      Jack Christensen MD  01/27/25  06:43 EST

## 2025-01-27 NOTE — PLAN OF CARE
Problem: Adult Inpatient Plan of Care  Goal: Plan of Care Review  Outcome: Progressing  Flowsheets (Taken 1/27/2025 0505)  Progress: no change  Outcome Evaluation: Bed in lowest position, call light within reach. Cardiology to see patient this AM.  Plan of Care Reviewed With: patient  Goal: Patient-Specific Goal (Individualized)  Outcome: Progressing  Goal: Absence of Hospital-Acquired Illness or Injury  Outcome: Progressing  Intervention: Identify and Manage Fall Risk  Description: Perform standard risk assessment on admission using a validated tool or comprehensive approach appropriate to the patient; reassess fall risk frequently, with change in status or transfer to another level of care.  Communicate risk to interprofessional healthcare team; ensure fall risk visible cue.  Determine need for increased observation, equipment and environmental modification, as well as use of supportive, nonskid footwear.  Adjust safety measures to individual needs and identified risk factors.  Reinforce the importance of active participation with fall risk prevention, safety, and physical activity with the patient and family.  Perform regular intentional rounding to assess need for position change, pain assessment and personal needs, including assistance with toileting.  Recent Flowsheet Documentation  Taken 1/27/2025 0400 by Faustina Granda RN  Safety Promotion/Fall Prevention: safety round/check completed  Taken 1/27/2025 0200 by Faustina Granda RN  Safety Promotion/Fall Prevention: safety round/check completed  Taken 1/27/2025 0005 by Faustina Granda RN  Safety Promotion/Fall Prevention: safety round/check completed  Taken 1/26/2025 2200 by Faustina Granda, RN  Safety Promotion/Fall Prevention: safety round/check completed  Taken 1/26/2025 2000 by Faustina Granda RN  Safety Promotion/Fall Prevention: safety round/check completed  Intervention: Prevent Skin Injury  Description: Perform a screening for skin  injury risk, such as pressure or moisture-associated skin damage on admission and at regular intervals throughout hospital stay.  Keep all areas of skin (especially folds) clean and dry.  Maintain adequate skin hydration.  Relieve and redistribute pressure and protect bony prominences and skin at risk for injury; implement measures based on patient-specific risk factors.  Match turning and repositioning schedule to clinical condition.  Encourage weight shift frequently; assist with reposition if unable to complete independently.  Float heels off bed; avoid pressure on the Achilles tendon.  Keep skin free from extended contact with medical devices.  Optimize nutrition and hydration.  Encourage functional activity and mobility, as early as tolerated.  Use aids (e.g., slide boards, mechanical lift) during transfer.  Recent Flowsheet Documentation  Taken 1/26/2025 2000 by Faustina Granda RN  Body Position: position changed independently  Intervention: Prevent and Manage VTE (Venous Thromboembolism) Risk  Description: Assess for VTE (venous thromboembolism) risk.  Promote early mobilization; encourage both active and passive leg exercises, if unable to ambulate.  Initiate and maintain compression or other therapy, as indicated, based on identified risk in accordance with organizational protocol and provider order.  Recognize the patient's individual risk for bleeding before initiating pharmacologic thromboprophylaxis.  Recent Flowsheet Documentation  Taken 1/26/2025 2000 by Faustina Granda, RN  VTE Prevention/Management:   bilateral   SCDs (sequential compression devices) off  Intervention: Prevent Infection  Description: Maintain skin and mucous membrane integrity; promote hand, oral and pulmonary hygiene.  Optimize fluid balance, nutrition, sleep and glycemic control to maximize infection resistance.  Identify potential sources of infection early to prevent or mitigate progression of infection (e.g., wound, lines,  devices).  Evaluate ongoing need for invasive devices; remove promptly when no longer indicated.  Review vaccination status.  Recent Flowsheet Documentation  Taken 1/27/2025 0400 by Faustina Granda RN  Infection Prevention:   hand hygiene promoted   single patient room provided   rest/sleep promoted  Taken 1/27/2025 0200 by Faustina Granda RN  Infection Prevention:   hand hygiene promoted   rest/sleep promoted   single patient room provided  Taken 1/27/2025 0005 by Faustina Granda RN  Infection Prevention:   hand hygiene promoted   rest/sleep promoted   single patient room provided  Taken 1/26/2025 2200 by Faustina Granda RN  Infection Prevention:   hand hygiene promoted   rest/sleep promoted   single patient room provided  Taken 1/26/2025 2000 by Faustina Granda RN  Infection Prevention:   hand hygiene promoted   rest/sleep promoted   single patient room provided  Goal: Optimal Comfort and Wellbeing  Outcome: Progressing  Intervention: Provide Person-Centered Care  Description: Use a family-focused approach to care; encourage support system presence and participation.  Develop trust and rapport by proactively providing information, encouraging questions, addressing concerns and offering reassurance.  Acknowledge emotional response to hospitalization.  Recognize and utilize personal coping strategies and strengths; develop goals via shared decision-making.  Honor spiritual and cultural preferences.  Recent Flowsheet Documentation  Taken 1/26/2025 2000 by Faustina Granda RN  Trust Relationship/Rapport:   care explained   choices provided   emotional support provided   empathic listening provided   questions answered   reassurance provided   questions encouraged   thoughts/feelings acknowledged  Goal: Readiness for Transition of Care  Outcome: Progressing     Problem: Comorbidity Management  Goal: Blood Pressure in Desired Range  Outcome: Progressing  Intervention: Maintain Blood Pressure  Management  Description: Evaluate adherence to home antihypertensive regimen (e.g., exercise and activity, diet modification, medication).  Provide scheduled antihypertensive medication; consider administration time and effects (e.g., avoid giving diuretic prior to bedtime).  Monitor response to antihypertensive medication therapy (e.g., blood pressure, electrolyte levels, medication effects).  Minimize risk of orthostatic hypotension; encourage caution with position changes, particularly if elderly.  Recent Flowsheet Documentation  Taken 1/26/2025 2000 by Faustina Granda RN  Medication Review/Management: medications reviewed   Goal Outcome Evaluation:  Plan of Care Reviewed With: patient        Progress: no change  Outcome Evaluation: Bed in lowest position, call light within reach. Cardiology to see patient this AM.

## 2025-01-28 ENCOUNTER — TELEPHONE (OUTPATIENT)
Dept: CARDIOLOGY | Facility: CLINIC | Age: 65
End: 2025-01-28
Payer: MEDICARE

## 2025-01-28 NOTE — TELEPHONE ENCOUNTER
1/25 admitted to ED w/chest and back pain and low BP.     Had stress test and subsequent cath.    Home now with dizziness and feeling very fatigued. States BP runs low but was given Midodrine in the hospital for extremely low BP.     Typically takes Carvedilol and needs to know if she should resume after taken off in hospital.

## 2025-01-29 NOTE — TELEPHONE ENCOUNTER
Since her blood pressure has been low, she should NOT take carvedilol. She should check her blood pressure twice daily and keep a log, then bring to her follow-up. She will need a follow-up with me in 2 weeks.

## 2025-02-04 ENCOUNTER — TELEPHONE (OUTPATIENT)
Dept: CARDIOLOGY | Facility: CLINIC | Age: 65
End: 2025-02-04
Payer: MEDICARE

## 2025-02-04 NOTE — TELEPHONE ENCOUNTER
Caller: Gina Wright    Relationship: Self    Best call back number: [phone]     Who is your current provider: DR OSORIO    Is your current provider offboarding? NO    Who would you like your new provider to be: DR SHELTON    What are your reasons for transferring care: PT IS NOT HAPPY WITH HER CARE FROM DR OSORIO, WISHES TO SEE DR SHELTON.     Additional notes: NA

## 2025-02-05 ENCOUNTER — OFFICE (AMBULATORY)
Dept: URBAN - METROPOLITAN AREA CLINIC 64 | Facility: CLINIC | Age: 65
End: 2025-02-05
Payer: COMMERCIAL

## 2025-02-05 VITALS
WEIGHT: 170 LBS | DIASTOLIC BLOOD PRESSURE: 84 MMHG | HEART RATE: 90 BPM | HEIGHT: 60 IN | SYSTOLIC BLOOD PRESSURE: 135 MMHG

## 2025-02-05 DIAGNOSIS — D50.9 IRON DEFICIENCY ANEMIA, UNSPECIFIED: ICD-10-CM

## 2025-02-05 DIAGNOSIS — K44.9 DIAPHRAGMATIC HERNIA WITHOUT OBSTRUCTION OR GANGRENE: ICD-10-CM

## 2025-02-05 DIAGNOSIS — K21.9 GASTRO-ESOPHAGEAL REFLUX DISEASE WITHOUT ESOPHAGITIS: ICD-10-CM

## 2025-02-05 PROCEDURE — 99214 OFFICE O/P EST MOD 30 MIN: CPT

## 2025-02-06 LAB
HCV RT-PCR, QUANT (NON-GRAPH): HCV LOG10: (no result)
HCV RT-PCR, QUANT (NON-GRAPH): HEPATITIS C QUANTITATION: (no result) IU/ML
HCV RT-PCR, QUANT (NON-GRAPH): TEST INFORMATION: (no result)

## 2025-02-17 ENCOUNTER — TELEPHONE (OUTPATIENT)
Dept: CARDIOLOGY | Facility: CLINIC | Age: 65
End: 2025-02-17
Payer: MEDICARE

## 2025-02-17 NOTE — TELEPHONE ENCOUNTER
FACILITY: [facility]   DR:   PHONE:[phone]   FAX: [phone]  PROCEDURE: DISE  SCHEDULED: 03/27/25  MEDS TO HOLD: NONE    Pt  has a Hosp F/u with Dr Marsh on 02/20/25 gave cardiac clearance to his ALMITA Driscoll

## 2025-02-20 ENCOUNTER — OFFICE VISIT (OUTPATIENT)
Dept: CARDIOLOGY | Facility: CLINIC | Age: 65
End: 2025-02-20
Payer: MEDICARE

## 2025-02-20 VITALS
DIASTOLIC BLOOD PRESSURE: 82 MMHG | HEIGHT: 60 IN | SYSTOLIC BLOOD PRESSURE: 127 MMHG | BODY MASS INDEX: 34.26 KG/M2 | WEIGHT: 174.5 LBS | OXYGEN SATURATION: 97 % | HEART RATE: 93 BPM

## 2025-02-20 DIAGNOSIS — E78.2 MIXED HYPERLIPIDEMIA: Primary | ICD-10-CM

## 2025-02-20 DIAGNOSIS — R06.09 DOE (DYSPNEA ON EXERTION): ICD-10-CM

## 2025-02-20 DIAGNOSIS — Z01.810 PREOP CARDIOVASCULAR EXAM: ICD-10-CM

## 2025-02-20 DIAGNOSIS — G47.33 OSA ON CPAP: ICD-10-CM

## 2025-02-20 NOTE — PROGRESS NOTES
"    Subjective:     Encounter Date:02/20/2025      Patient ID: Gina Wright is a 65 y.o. female.    Chief Complaint:  Patient states that she was told at one time that she has a heart birth defect and wanted to know more about this, where it is located exactly, what kind etc...  History of Present Illness 65-year-old female with history of hyperlipidemia sleep apnea presents to my office for a new consultation.  Patient needs preop evaluation for noncardiac surgery.  Patient does not have any symptoms of chest pain but has occasional shortness of breath.  No complaint any PND or orthopnea.  No palpitations dizziness syncope or swelling of the feet.  Patient has been taking her medicines regularly.  Patient does not smoke.  /82 (BP Location: Left arm, Patient Position: Sitting, Cuff Size: Adult)   Pulse 93   Ht 152.4 cm (60\")   Wt 79.2 kg (174 lb 8 oz)   SpO2 97%   BMI 34.08 kg/m²     The following portions of the patient's history were reviewed and updated as appropriate: allergies, current medications, past family history, past medical history, past social history, past surgical history, and problem list.  Past Medical History:   Diagnosis Date    Anesthesia complication     pt states she needs extra d/t red hair    Arthritis     Depressive disorder     Enlarged heart     Hyperlipidemia     Hypothyroid     Migraine     MRSA infection     on hands    Obstructive sleep apnea     PONV (postoperative nausea and vomiting)     Prediabetes     Tachycardia      Past Surgical History:   Procedure Laterality Date    BACK SURGERY      CARDIAC CATHETERIZATION Right 05/27/2023    Procedure: Left Heart Cath and coronary angiogram;  Surgeon: Jack Christensen MD;  Location: [facility] CATH INVASIVE LOCATION;  Service: Cardiovascular;  Laterality: Right;  5 Fr catheters    CARDIAC CATHETERIZATION Right 1/27/2025    Procedure: Left Heart Cath;  Surgeon: Jack Christensen MD;  Location:  [facility] CATH INVASIVE LOCATION;  Service: " Cardiovascular;  Laterality: Right;  5Fr catheters    GALLBLADDER SURGERY      HYSTERECTOMY      MASS EXCISION Right 05/11/2022    Procedure: LIPOMA EXCISION from right shoulder;  Surgeon: Benjamin Munguia MD;  Location: [facility] MAIN OR;  Service: General;  Laterality: Right;    NASAL SEPTUM SURGERY      TONSILLECTOMY       Social History     Socioeconomic History    Marital status:     Number of children: 2    Years of education: 14   Tobacco Use    Smoking status: Never     Passive exposure: Past    Smokeless tobacco: Never   Vaping Use    Vaping status: Never Used   Substance and Sexual Activity    Alcohol use: Not Currently    Drug use: Not Currently    Sexual activity: Defer     Family History   Problem Relation Age of Onset    Stroke Mother     Mental illness Father     Heart disease Father     Sleep apnea Neg Hx        Current Outpatient Medications:     DULoxetine (CYMBALTA) 30 MG capsule, Take 1 capsule by mouth Every Morning., Disp: , Rfl:     DULoxetine (CYMBALTA) 60 MG capsule, Take 1 capsule by mouth Daily. Take along with Duloxetine 30 mg to equal 90 mg daily dose., Disp: , Rfl:     Inclisiran Sodium (LEQVIO SC), Inject  under the skin into the appropriate area as directed Every 6 (Six) Months., Disp: , Rfl:     ipratropium (ATROVENT) 0.03 % nasal spray, Administer 2 sprays into the nostril(s) as directed by provider 2 (Two) Times a Day., Disp: , Rfl:     meloxicam (MOBIC) 15 MG tablet, Take 1 tablet by mouth Daily., Disp: , Rfl:     mirtazapine (REMERON) 15 MG tablet, Take 1 tablet by mouth Every Night. (Patient taking differently: Take 1 tablet by mouth Every Night. She is going to be discontinuing this medication), Disp: , Rfl:     pantoprazole (PROTONIX) 40 MG EC tablet, Take 1 tablet by mouth Daily., Disp: 30 tablet, Rfl: 0    Semaglutide-Weight Management 0.25 MG/0.5ML solution auto-injector, Inject 0.5 mL under the skin into the appropriate area as directed Every 7 (Seven) Days. Sundays.  (Patient not taking: Reported on 2/20/2025), Disp: , Rfl:   Allergies   Allergen Reactions    Ferumoxytol Anaphylaxis     Within 8 mins of infusion patient flushed including arms, diaphoretic, nauseous, chest and back pain.     Compazine [Prochlorperazine] Anxiety    Sulfa Antibiotics Other (See Comments)     Feels like I'm going to die.  Internal pain.    Atorvastatin Myalgia    Ciprofloxacin Rash     CAUSES AGITATION    Crestor [Rosuvastatin] Myalgia    Egg-Derived Products Nausea Only    Iron Other (See Comments)     Flu like symptoms    Prochlorperazine Edisylate Other (See Comments)     Jitters // Zofran    Trazodone Other (See Comments)     Unable to sleep       Review of Systems   Constitutional: Positive for malaise/fatigue.   Cardiovascular:  Negative for chest pain, dyspnea on exertion, leg swelling and palpitations.   Respiratory:  Positive for shortness of breath. Negative for cough.    Gastrointestinal:  Negative for abdominal pain, nausea and vomiting.   Neurological:  Negative for dizziness, focal weakness, headaches, light-headedness and numbness.   All other systems reviewed and are negative.             Objective:     Constitutional:       Appearance: Well-developed.   Eyes:      General: No scleral icterus.     Conjunctiva/sclera: Conjunctivae normal.   HENT:      Head: Normocephalic and atraumatic.   Neck:      Vascular: No carotid bruit or JVD.   Pulmonary:      Effort: Pulmonary effort is normal.      Breath sounds: Normal breath sounds. No wheezing. No rales.   Cardiovascular:      Normal rate. Regular rhythm.   Pulses:     Intact distal pulses.   Abdominal:      General: Bowel sounds are normal.      Palpations: Abdomen is soft.   Musculoskeletal:      Cervical back: Normal range of motion and neck supple. Skin:     General: Skin is warm and dry.      Findings: No rash.   Neurological:      Mental Status: Alert.         Procedures    Lab Review:       Assessment:          Diagnosis Plan   1.  Mixed hyperlipidemia        2. RALPH (dyspnea on exertion)        3. BEBETO on CPAP        4. Preop cardiovascular exam               Plan:    Patient has shortness of breath from sleep apnea and needs preop evaluation for an inspire device  Patient has no significant CAD and has been on Leqvio for hyperlipidemia  Patient did not tolerate Coreg because of low blood pressure  Patient is cleared for noncardiac surgery with low risk for perioperative cardiac events.  Patient be followed as needed.

## 2025-02-21 ENCOUNTER — TELEPHONE (OUTPATIENT)
Dept: CARDIOLOGY | Facility: CLINIC | Age: 65
End: 2025-02-21
Payer: MEDICARE

## 2025-02-21 NOTE — TELEPHONE ENCOUNTER
FACILITY: [facility]  DR: Tamiko  PHONE: [phone]  FAX: [phone]  PROCEDURE: EGD  SCHEDULED: TBD  MEDS TO HOLD:  NONE  Office Visit with Austin Marsh MD (02/20/2025)

## 2025-05-07 DIAGNOSIS — G47.33 OBSTRUCTIVE SLEEP APNEA, ADULT: Primary | ICD-10-CM

## 2025-07-03 ENCOUNTER — TELEPHONE (OUTPATIENT)
Dept: CARDIOLOGY | Facility: CLINIC | Age: 65
End: 2025-07-03
Payer: MEDICARE

## 2025-07-03 RX ORDER — INCLISIRAN 284 MG/1.5ML
284 INJECTION, SOLUTION SUBCUTANEOUS
Start: 2025-07-03

## 2025-07-03 NOTE — TELEPHONE ENCOUNTER
Caller: Gina Wright    Relationship: Self    Best call back number: [phone]     What are your concerns: PT IS CURRENTLY SEEING DR SHELTON BUT PREVIOUSLY SAW DR OSORIO - WHILE SEEING DR OSORIO SHE WAS GETTING CHOLESTEROL SHOTS TO BRING CHOLESTEROL DOWN, SHE WAS GETTING THEM THROUGH THE [facility] AND SINCE SWITCHING SHE HAS NOT HEARD FROM ANYONE ABOUT THE SHOTS - SHE STATES THEY HELP A LOT AND WOULD LIKE TO PROCEED WITH THEM IF POSSIBLE - PT LAST SHOT OF LEQVIO WAS DECEMBER AND SHE USUALLY GETS THEM EVERY 6MONTHS, SHE IS ASKING ADVISEMENT -

## 2025-07-07 NOTE — TELEPHONE ENCOUNTER
"I reached out to one of the clinical pharmacists, Farzaneh Coats, in the ambulatory care clinic at [facility]. Here is her response:    \"It looks like she still has an active treatment plan in for it, but prior auth has . We can have her sign a new start form to get the prior auth and benefits investigation done. I will contact the patient to see about getting that signed, will then need a signature from you. Once approved, I will have the  for cancer [facility] center get her back on their schedule. Thanks!\"   "

## 2025-07-10 ENCOUNTER — TELEPHONE (OUTPATIENT)
Dept: PHARMACY | Facility: HOSPITAL | Age: 65
End: 2025-07-10
Payer: MEDICARE

## 2025-07-24 ENCOUNTER — OFFICE VISIT (OUTPATIENT)
Dept: SLEEP MEDICINE | Facility: CLINIC | Age: 65
End: 2025-07-24
Payer: MEDICARE

## 2025-07-24 VITALS
SYSTOLIC BLOOD PRESSURE: 140 MMHG | HEART RATE: 85 BPM | OXYGEN SATURATION: 97 % | WEIGHT: 167 LBS | DIASTOLIC BLOOD PRESSURE: 74 MMHG | BODY MASS INDEX: 32.79 KG/M2 | HEIGHT: 60 IN

## 2025-07-24 DIAGNOSIS — Z78.9 INTOLERANCE OF CONTINUOUS POSITIVE AIRWAY PRESSURE (CPAP) VENTILATION: ICD-10-CM

## 2025-07-24 DIAGNOSIS — Z96.82 S/P INSERTION OF HYPOGLOSSAL NERVE STIMULATOR: ICD-10-CM

## 2025-07-24 DIAGNOSIS — G47.33 OBSTRUCTIVE SLEEP APNEA, ADULT: Primary | ICD-10-CM

## 2025-07-24 PROCEDURE — G0463 HOSPITAL OUTPT CLINIC VISIT: HCPCS

## 2025-07-24 NOTE — PROGRESS NOTES
[address] [address]  Phone   Fax       SLEEP CLINIC FOLLOW-UP PROGRESS NOTE    Gina Wright  [id number]   1960  65 y.o.  female      PCP: Cee Pozo MD      Date of visit: 7/24/2025          CHIEF COMPLAINT: Obstructive sleep apnea    HPI:  This is a 65 y.o. year old patient who presents to the clinic today for the management of obstructive sleep apnea.  Patient had a home sleep study 10/1/2022 showing obstructive sleep apnea with AHI of 11.7/h. Her lowest desaturation was 73%.  She had a hard time tolerating CPAP and underwent titration study 8/2023 but did not tolerate BiPAP so CPAP at 12 cm H2O was recommended.  Patient also with history of insomnia, has followed with psychiatrist in the past, patient feels insomnia is related to PAP device use.  As patient was not tolerating Pap therapy she opted to look into inspire candidacy.  She had follow-up home sleep study 10/2024 which showed AHI of 21/h with lowest SpO2 of 76%.    Patient ended up undergoing inspire placement with Dr. Tiwari.  Patient presents today for follow-up and device activation.  Reports no tongue pain or discomfort.  No healing issues with incisions noted on exam.  No pain, drainage, or s/s infection. Reports she did initially have nausea and bruising after implantation, quite uncomfortable for a while, however now resolved.  Saw surgeon back for follow-up.  Denies tongue weakness or trouble swallowing.    Presents today for device activation.  Device activated in the office with Hilary Campoverde, patient tolerated well without issues or discomfort.  Waveform ran with inspire rep.    Patient educated on use of device/remote and plan for adjusting settings.        INSPIRE DEVICE OVERVIEW:  Implantation date: 6/10/25, Dr. Tiwari  Activation date: 7/24/25  Sensation: 0.5V  Functional capacity: 0.6 volts  ----denied any discomfort at this level  "whatsoever  Voltage range: 0.6-1.6 volts  Start delay: 30 min min  Duration of time: 8 h  Pause time: 15 min        MEDICATIONS: reviewed     ALLERGIES:  Ferumoxytol, Compazine [prochlorperazine], Sulfa antibiotics, Atorvastatin, Ciprofloxacin, Crestor [rosuvastatin], Egg-derived products, Iron, Prochlorperazine edisylate, and Trazodone    SOCIAL HISTORY (habits pertaining to sleep medicine):  Tobacco use: No   Alcohol use: 0 per week  Caffeine use: 1    REVIEW OF SYSTEMS:   Pertinent positive symptoms:  Hanahan Sleepiness Scale: Total score: 1   Morning headache  Acid reflux  Anxiety: Follows outside provider  Intermittent dyspnea: Follows with cardiology        PHYSICAL EXAMINATION:  CONSTITUTIONAL:   Vitals:    07/24/25 1300   BP: 140/74   BP Location: Left arm   Patient Position: Sitting   Pulse: 85   SpO2: 97%   Weight: 75.8 kg (167 lb)   Height: 152.4 cm (60\")    Body mass index is 32.61 kg/m².   HEAD: atraumatic, normocephalic  RESP SYSTEM: Not in respiratory distress, breathing unlabored  CARDIOVASULAR: No edema noted  NEURO: Alert and oriented x 3, gait normal, mood and affect appeared appropriate          ASSESSMENT AND PLAN:  Obstructive sleep apnea s/p insertion of hypoglossal nerve stimulator: Good healing.  Denies any tongue weakness or pain or trouble swallowing.  Device activated in the office today, tolerated well, without issues or concerns.  Hilary Campoverde, in the office today to assist.  Patient educated at length on remote and process of increasing levels approximately once a week as tolerated.  Patient educated on when to reach out to the office if having issues or concerns prior to next visit.  Will see patient back in approximately 4 to 6 weeks or sooner for issues or concerns.  Intolerance of continuous positive airway pressure (CPAP)  Obesity: Body mass index is 32.61 kg/m².. Patients who are overweight or obese are at increased risk of sleep apnea/ sleep disordered breathing. Weight " reduction and healthy lifestyle are encouraged in overweight/ obese patients as part of a comprehensive approach to sleep apnea treatment.    Trouble sleeping: Patient reports that this was due to PAP device.  Patient does not expect residual issues now that she has inspire.      Patient will follow-up in 4 to 6 weeks or follow-up sooner for any issues or concerns.  Discussed signs/symptoms that would warrant sooner call or follow-up to the office.  Patient's questions were answered.        Thank you for allowing me to participate in the care of this patient.    Yamila Rios DNP, APRN  [facility]

## 2025-08-05 ENCOUNTER — TELEPHONE (OUTPATIENT)
Dept: CARDIOLOGY | Facility: CLINIC | Age: 65
End: 2025-08-05
Payer: MEDICARE

## 2025-08-06 RX ORDER — INCLISIRAN 284 MG/1.5ML
284 INJECTION, SOLUTION SUBCUTANEOUS
Start: 2025-08-06

## 2025-08-14 ENCOUNTER — DISEASE STATE MANAGEMENT VISIT (OUTPATIENT)
Dept: PHARMACY | Facility: HOSPITAL | Age: 65
End: 2025-08-14
Payer: MEDICARE

## 2025-08-14 DIAGNOSIS — E78.2 MIXED HYPERLIPIDEMIA: Primary | ICD-10-CM

## 2025-08-14 DIAGNOSIS — Z88.8 ALLERGY TO STATIN MEDICATION: ICD-10-CM

## 2025-08-14 RX ORDER — FAMOTIDINE 10 MG/ML
20 INJECTION, SOLUTION INTRAVENOUS AS NEEDED
OUTPATIENT
Start: 2025-08-14

## 2025-08-14 RX ORDER — SODIUM CHLORIDE 9 MG/ML
20 INJECTION, SOLUTION INTRAVENOUS ONCE
OUTPATIENT
Start: 2025-08-14

## 2025-08-14 RX ORDER — DIPHENHYDRAMINE HYDROCHLORIDE 50 MG/ML
50 INJECTION, SOLUTION INTRAMUSCULAR; INTRAVENOUS AS NEEDED
OUTPATIENT
Start: 2025-08-14

## 2025-08-14 RX ORDER — HYDROCORTISONE SODIUM SUCCINATE 100 MG/2ML
100 INJECTION INTRAMUSCULAR; INTRAVENOUS AS NEEDED
OUTPATIENT
Start: 2025-08-14

## 2025-08-14 RX ORDER — MEPERIDINE HYDROCHLORIDE 25 MG/ML
25 INJECTION INTRAMUSCULAR; INTRAVENOUS; SUBCUTANEOUS AS NEEDED
OUTPATIENT
Start: 2025-08-14

## 2025-08-25 ENCOUNTER — HOSPITAL ENCOUNTER (OUTPATIENT)
Dept: ONCOLOGY | Facility: HOSPITAL | Age: 65
Discharge: HOME | End: 2025-08-25
Admitting: NURSE PRACTITIONER
Payer: MEDICARE

## 2025-08-25 DIAGNOSIS — Z88.8 ALLERGY TO STATIN MEDICATION: Primary | ICD-10-CM

## 2025-08-25 DIAGNOSIS — E78.2 MIXED HYPERLIPIDEMIA: ICD-10-CM

## 2025-08-25 PROCEDURE — 25010000002 INCLISIRAN SODIUM 284 MG/1.5ML SOLUTION PREFILLED SYRINGE: Performed by: NURSE PRACTITIONER

## 2025-08-25 PROCEDURE — 96372 THER/PROPH/DIAG INJ SC/IM: CPT

## 2025-08-25 RX ORDER — DIPHENHYDRAMINE HYDROCHLORIDE 50 MG/ML
50 INJECTION, SOLUTION INTRAMUSCULAR; INTRAVENOUS AS NEEDED
OUTPATIENT
Start: 2026-02-21

## 2025-08-25 RX ORDER — HYDROCORTISONE SODIUM SUCCINATE 100 MG/2ML
100 INJECTION INTRAMUSCULAR; INTRAVENOUS AS NEEDED
OUTPATIENT
Start: 2026-02-21

## 2025-08-25 RX ORDER — SODIUM CHLORIDE 9 MG/ML
20 INJECTION, SOLUTION INTRAVENOUS ONCE
OUTPATIENT
Start: 2026-02-21

## 2025-08-25 RX ORDER — MEPERIDINE HYDROCHLORIDE 25 MG/ML
25 INJECTION INTRAMUSCULAR; INTRAVENOUS; SUBCUTANEOUS AS NEEDED
OUTPATIENT
Start: 2026-02-21

## 2025-08-25 RX ORDER — FAMOTIDINE 10 MG/ML
20 INJECTION, SOLUTION INTRAVENOUS AS NEEDED
OUTPATIENT
Start: 2026-02-21

## 2025-08-25 RX ADMIN — INCLISIRAN 284 MG: 284 INJECTION, SOLUTION SUBCUTANEOUS at 14:33

## 2025-08-28 ENCOUNTER — OFFICE VISIT (OUTPATIENT)
Dept: SLEEP MEDICINE | Facility: CLINIC | Age: 65
End: 2025-08-28
Payer: MEDICARE

## 2025-08-28 VITALS
HEIGHT: 60 IN | SYSTOLIC BLOOD PRESSURE: 123 MMHG | BODY MASS INDEX: 32.59 KG/M2 | HEART RATE: 82 BPM | WEIGHT: 166 LBS | OXYGEN SATURATION: 100 % | DIASTOLIC BLOOD PRESSURE: 71 MMHG

## 2025-08-28 DIAGNOSIS — G47.33 OBSTRUCTIVE SLEEP APNEA, ADULT: Primary | ICD-10-CM

## 2025-08-28 DIAGNOSIS — E66.9 OBESITY (BMI 30-39.9): ICD-10-CM

## 2025-08-28 DIAGNOSIS — G47.00 INSOMNIA, UNSPECIFIED TYPE: ICD-10-CM

## 2025-08-28 DIAGNOSIS — Z96.82 S/P INSERTION OF HYPOGLOSSAL NERVE STIMULATOR: ICD-10-CM

## 2025-08-28 DIAGNOSIS — Z78.9 INTOLERANCE OF CONTINUOUS POSITIVE AIRWAY PRESSURE (CPAP) VENTILATION: ICD-10-CM

## 2025-08-28 PROCEDURE — G0463 HOSPITAL OUTPT CLINIC VISIT: HCPCS

## 2025-08-28 RX ORDER — ESKETAMINE HYDROCHLORIDE 28 MG/.2ML
SOLUTION NASAL
COMMUNITY

## (undated) DEVICE — SOLUTION,WATER,IRRIGATION,1000ML,STERILE: Brand: MEDLINE

## (undated) DEVICE — KT SURG TURNOVER 050

## (undated) DEVICE — GOWN,REINFORCE,POLY,SIRUS,BREATH SLV,XLG: Brand: MEDLINE

## (undated) DEVICE — ADHS SKIN PREMIERPRO EXOFIN TOPICAL HI/VISC .5ML

## (undated) DEVICE — UNDYED BRAIDED (POLYGLACTIN 910), SYNTHETIC ABSORBABLE SUTURE: Brand: COATED VICRYL

## (undated) DEVICE — GLIDESHEATH SLENDER ACCESS KIT: Brand: GLIDESHEATH SLENDER

## (undated) DEVICE — GW EMR FIX EXCHG J STD .035 3MM 260CM

## (undated) DEVICE — SWAN-GANZ THERMODILUTION CATHETER: Brand: SWAN-GANZ

## (undated) DEVICE — PK TRY HEART CATH 50

## (undated) DEVICE — CATH DIAG IMPULSE FR4 5F 100CM

## (undated) DEVICE — 9165 UNIVERSAL PATIENT PLATE: Brand: 3M™

## (undated) DEVICE — DGW .035 FC J3MM 260CM TEF: Brand: EMERALD

## (undated) DEVICE — ST ACC MICROPUNCTURE STFF/CANN PLAT/TP 4F 21G 40CM

## (undated) DEVICE — PINNACLE INTRODUCER SHEATH: Brand: PINNACLE

## (undated) DEVICE — DECANTER: Brand: UNBRANDED

## (undated) DEVICE — TBG PENCL TELESCP MEGADYNE SMOKE EVAC 15FT

## (undated) DEVICE — DRAPE, RADIAL, STERILE: Brand: MEDLINE

## (undated) DEVICE — TR BAND RADIAL ARTERY COMPRESSION DEVICE: Brand: TR BAND

## (undated) DEVICE — GLV SURG BIOGEL LTX PF 7

## (undated) DEVICE — UNDERGLV SURG BIOGEL INDICATOR LTX PF 7

## (undated) DEVICE — CATH DIAG IMPULSE FR4 6F 100CM

## (undated) DEVICE — CATH DIAG IMPULSE FL3.5 6F 100CM

## (undated) DEVICE — CATH DIAG IMPULSE FL3.5 5F 100CM

## (undated) DEVICE — BND COMPR ZEPHYR VASC LG